# Patient Record
Sex: FEMALE | Race: WHITE | NOT HISPANIC OR LATINO | Employment: FULL TIME | URBAN - METROPOLITAN AREA
[De-identification: names, ages, dates, MRNs, and addresses within clinical notes are randomized per-mention and may not be internally consistent; named-entity substitution may affect disease eponyms.]

---

## 2017-01-27 ENCOUNTER — TRANSCRIBE ORDERS (OUTPATIENT)
Dept: ADMINISTRATIVE | Facility: HOSPITAL | Age: 42
End: 2017-01-27

## 2017-01-27 DIAGNOSIS — N63.0 LUMP OR MASS IN BREAST: Primary | ICD-10-CM

## 2017-01-30 ENCOUNTER — APPOINTMENT (OUTPATIENT)
Dept: OTHER | Facility: HOSPITAL | Age: 42
End: 2017-01-30
Attending: OBSTETRICS & GYNECOLOGY

## 2017-01-30 ENCOUNTER — HOSPITAL ENCOUNTER (OUTPATIENT)
Dept: MAMMOGRAPHY | Facility: HOSPITAL | Age: 42
Discharge: HOME | End: 2017-01-30
Attending: OBSTETRICS & GYNECOLOGY | Admitting: OBSTETRICS & GYNECOLOGY

## 2017-01-30 ENCOUNTER — HOSPITAL ENCOUNTER (OUTPATIENT)
Dept: ULTRASOUND IMAGING | Facility: HOSPITAL | Age: 42
Discharge: HOME | End: 2017-01-30

## 2017-01-30 DIAGNOSIS — N63.0 LUMP OR MASS IN BREAST: ICD-10-CM

## 2017-01-30 PROCEDURE — 77062 BREAST TOMOSYNTHESIS BI: CPT | Performed by: RADIOLOGY

## 2017-01-30 PROCEDURE — G0279 TOMOSYNTHESIS, MAMMO: HCPCS

## 2017-01-30 PROCEDURE — 77066 DX MAMMO INCL CAD BI: CPT | Performed by: RADIOLOGY

## 2017-01-30 PROCEDURE — 76642 ULTRASOUND BREAST LIMITED: CPT

## 2017-01-30 PROCEDURE — G0204 DX MAMMO INCL CAD BI: HCPCS

## 2017-01-30 PROCEDURE — 76642 ULTRASOUND BREAST LIMITED: CPT | Performed by: RADIOLOGY

## 2017-02-07 ENCOUNTER — APPOINTMENT (OUTPATIENT)
Dept: MAMMOGRAPHY | Facility: HOSPITAL | Age: 42
End: 2017-02-07
Attending: OBSTETRICS & GYNECOLOGY

## 2017-02-23 ENCOUNTER — LAB (OUTPATIENT)
Dept: LAB | Facility: HOSPITAL | Age: 42
End: 2017-02-23

## 2017-02-23 ENCOUNTER — TRANSCRIBE ORDERS (OUTPATIENT)
Dept: LAB | Facility: HOSPITAL | Age: 42
End: 2017-02-23

## 2017-02-23 DIAGNOSIS — Z32.01 PREGNANCY TEST PERFORMED, PREGNANCY CONFIRMED: ICD-10-CM

## 2017-02-23 DIAGNOSIS — Z32.01 PREGNANCY TEST PERFORMED, PREGNANCY CONFIRMED: Primary | ICD-10-CM

## 2017-02-23 LAB — HCG INTACT+B SERPL-ACNC: <5 MIU/ML

## 2017-02-23 PROCEDURE — 36415 COLL VENOUS BLD VENIPUNCTURE: CPT

## 2017-02-23 PROCEDURE — 84702 CHORIONIC GONADOTROPIN TEST: CPT | Performed by: SPECIALIST

## 2017-04-13 ENCOUNTER — OFFICE VISIT (OUTPATIENT)
Dept: INTERNAL MEDICINE | Facility: CLINIC | Age: 42
End: 2017-04-13

## 2017-04-13 VITALS
DIASTOLIC BLOOD PRESSURE: 62 MMHG | SYSTOLIC BLOOD PRESSURE: 110 MMHG | WEIGHT: 172 LBS | HEIGHT: 65 IN | BODY MASS INDEX: 28.66 KG/M2

## 2017-04-13 DIAGNOSIS — M54.2 NECK PAIN, BILATERAL: Primary | ICD-10-CM

## 2017-04-13 PROCEDURE — 96372 THER/PROPH/DIAG INJ SC/IM: CPT | Performed by: PHYSICIAN ASSISTANT

## 2017-04-13 PROCEDURE — 99213 OFFICE O/P EST LOW 20 MIN: CPT | Performed by: PHYSICIAN ASSISTANT

## 2017-04-13 RX ORDER — KETOROLAC TROMETHAMINE 30 MG/ML
30 INJECTION, SOLUTION INTRAMUSCULAR; INTRAVENOUS EVERY 6 HOURS PRN
Status: DISCONTINUED | OUTPATIENT
Start: 2017-04-13 | End: 2017-04-13

## 2017-04-13 RX ORDER — PREDNISONE 10 MG/1
TABLET ORAL
Qty: 30 TABLET | Refills: 0 | Status: SHIPPED | OUTPATIENT
Start: 2017-04-13 | End: 2017-04-13 | Stop reason: SDUPTHER

## 2017-04-13 RX ORDER — TIZANIDINE HYDROCHLORIDE 6 MG/1
6 CAPSULE, GELATIN COATED ORAL 3 TIMES DAILY
Qty: 90 CAPSULE | Refills: 0 | Status: SHIPPED | OUTPATIENT
Start: 2017-04-13 | End: 2018-01-25 | Stop reason: SDUPTHER

## 2017-04-13 RX ORDER — METHYLPREDNISOLONE ACETATE 80 MG/ML
40 INJECTION, SUSPENSION INTRA-ARTICULAR; INTRALESIONAL; INTRAMUSCULAR; SOFT TISSUE ONCE
Status: COMPLETED | OUTPATIENT
Start: 2017-04-13 | End: 2017-04-13

## 2017-04-13 RX ORDER — PREDNISONE 10 MG/1
TABLET ORAL
Qty: 30 TABLET | Refills: 0 | Status: SHIPPED | OUTPATIENT
Start: 2017-04-13 | End: 2018-10-09

## 2017-04-13 RX ORDER — METHYLPREDNISOLONE ACETATE 40 MG/ML
40 INJECTION, SUSPENSION INTRA-ARTICULAR; INTRALESIONAL; INTRAMUSCULAR; SOFT TISSUE ONCE
Status: DISCONTINUED | OUTPATIENT
Start: 2017-04-13 | End: 2017-04-13

## 2017-04-13 RX ORDER — PANTOPRAZOLE SODIUM 40 MG/1
40 TABLET, DELAYED RELEASE ORAL NIGHTLY
Qty: 30 TABLET | Refills: 0 | Status: SHIPPED | OUTPATIENT
Start: 2017-04-13 | End: 2017-04-13 | Stop reason: SDUPTHER

## 2017-04-13 RX ORDER — PANTOPRAZOLE SODIUM 40 MG/1
40 TABLET, DELAYED RELEASE ORAL NIGHTLY
Qty: 90 TABLET | Refills: 0 | Status: SHIPPED | OUTPATIENT
Start: 2017-04-13 | End: 2017-04-25 | Stop reason: SDUPTHER

## 2017-04-13 RX ORDER — KETOROLAC TROMETHAMINE 30 MG/ML
30 INJECTION, SOLUTION INTRAMUSCULAR; INTRAVENOUS ONCE
Status: COMPLETED | OUTPATIENT
Start: 2017-04-13 | End: 2017-04-13

## 2017-04-13 RX ADMIN — METHYLPREDNISOLONE ACETATE 40 MG: 80 INJECTION, SUSPENSION INTRA-ARTICULAR; INTRALESIONAL; INTRAMUSCULAR; SOFT TISSUE at 13:21

## 2017-04-13 RX ADMIN — KETOROLAC TROMETHAMINE 30 MG: 30 INJECTION, SOLUTION INTRAMUSCULAR; INTRAVENOUS at 13:20

## 2017-04-13 NOTE — PROGRESS NOTES
Chief Complaint   Patient presents with   • Ongoing both shoulder pain       Subjective   Rona Thomson is a 41 y.o. female.       History of Present Illness     Pt has had recurrence of the knots and pain in her bilateral medial shoulders and up into her neck, has been intermittent for 7 years, goes to massage therapist regularly. History of neck injury from water skiing accident, happened 20 years ago. Has had some residual pain since then. Has had xrays and MRI of neck in the past, most recent was xrays at chiropractor. Pain has recurred in the past few weeks, worsening over the past several days. Had steroid and toradol shot last year that helped the pain considerably, did not return until now. Does have significant stress with ongoing fertility treatments- not pregnant now. Concerned about what may be the underlying problem.      Current Outpatient Prescriptions:   •  cetirizine (ZyrTEC) 10 MG tablet, Take 10 mg by mouth daily., Disp: , Rfl:   •  cholecalciferol (VITAMIN D3) 1000 UNITS tablet, Take 1,000 Units by mouth daily., Disp: , Rfl:   •  clonazePAM (KlonoPIN) 1 MG tablet, Take 1 mg by mouth as needed for anxiety., Disp: , Rfl:   •  ibuprofen (ADVIL,MOTRIN) 600 MG tablet, Take 1 tablet by mouth every 6 (six) hours as needed for mild pain (1-3) for up to 30 doses., Disp: 30 tablet, Rfl: 0  •  lidocaine (LIDODERM) 5 %, Place 1 patch on the skin as needed for moderate pain (4-6). Remove & Discard patch within 12 hours or as directed by MD, Disp: , Rfl:   •  pantoprazole (PROTONIX) 40 MG EC tablet, Take 1 tablet by mouth Every Night., Disp: 90 tablet, Rfl: 0  •  Prenatal Vit-Fe Fumarate-FA (PRENATAL, CLASSIC, VITAMIN) 28-0.8 MG tablet tablet, Take 1 tablet by mouth daily., Disp: , Rfl:   •  TiZANidine (ZANAFLEX) 6 MG capsule, Take 1 capsule by mouth 3 (Three) Times a Day., Disp: 90 capsule, Rfl: 0  •  vitamin B-12 (CYANOCOBALAMIN) 1000 MCG tablet, Take 1,000 mcg by mouth daily., Disp: , Rfl:   •   "vitamin C (ASCORBIC ACID) 500 MG tablet, Take 500 mg by mouth daily., Disp: , Rfl:   •  predniSONE (DELTASONE) 10 MG tablet, Take 4 tablets for 3 days, then 3 tablets for 3 days, then 2 tablets for 3 days, then 1 tablet for 3 days, Disp: 30 tablet, Rfl: 0     PMFSH  The following portions of the patient's history were reviewed and updated as appropriate: allergies, current medications, past family history, past medical history, past social history, past surgical history and problem list.    Review of Systems   Constitutional: Negative for fever and unexpected weight change.   HENT: Negative.    Eyes: Negative.    Respiratory: Negative for shortness of breath and wheezing.    Cardiovascular: Negative for chest pain and palpitations.   Gastrointestinal: Negative for abdominal pain and blood in stool.   Endocrine: Negative.    Genitourinary: Negative.    Musculoskeletal: Positive for myalgias and neck pain. Negative for arthralgias and joint swelling.   Skin: Negative.    Allergic/Immunologic: Negative.    Neurological: Negative for seizures and syncope.   Hematological: Negative for adenopathy.   Psychiatric/Behavioral: Negative for agitation and confusion.       Objective   /62  Ht 65\" (165.1 cm)  Wt 172 lb (78 kg)  BMI 28.62 kg/m2    Physical Exam   Constitutional: She is oriented to person, place, and time. She appears well-developed and well-nourished.   HENT:   Head: Normocephalic and atraumatic.   Right Ear: External ear normal.   Left Ear: External ear normal.   Eyes: Conjunctivae are normal.   Neck: Normal range of motion.   Cardiovascular: Normal rate and regular rhythm.    Pulmonary/Chest: Effort normal.   Musculoskeletal: Normal range of motion.   Bilateral cervical spine paraspinal mm tenderness, trapezius tightness and tenderness, no decreased ROM   Neurological: She is alert and oriented to person, place, and time.   Skin: Skin is warm and dry.   Psychiatric: She has a normal mood and affect. " Her behavior is normal. Judgment and thought content normal.       Results for orders placed or performed in visit on 02/23/17   hCG, Quantitative, Pregnancy   Result Value Ref Range    HCG Quantitative <5.00 mIU/mL        ASSESSMENT/PLAN    Problem List Items Addressed This Visit        Nervous and Auditory    Neck pain, bilateral - Primary     Depomedrol and toradol IM in office. Take prednisone taper as directed. Refer for MRI of cervical spine and PT eval.         Relevant Medications    predniSONE (DELTASONE) 10 MG tablet    methylPREDNISolone acetate (DEPO-medrol) injection 40 mg (Completed)    ketorolac (TORADOL) injection 30 mg (Completed)    Other Relevant Orders    MRI Cervical Spine Without Contrast    Ambulatory Referral to Physical Therapy Evaluate and treat               Return if symptoms worsen or fail to improve, for Annual physical.

## 2017-04-14 PROBLEM — M54.2 NECK PAIN, BILATERAL: Status: ACTIVE | Noted: 2017-04-14

## 2017-04-14 NOTE — ASSESSMENT & PLAN NOTE
Depomedrol and toradol IM in office. Take prednisone taper as directed. Refer for MRI of cervical spine and PT eval.

## 2017-04-20 ENCOUNTER — APPOINTMENT (OUTPATIENT)
Dept: MRI IMAGING | Facility: HOSPITAL | Age: 42
End: 2017-04-20

## 2017-04-25 ENCOUNTER — TELEPHONE (OUTPATIENT)
Dept: INTERNAL MEDICINE | Facility: CLINIC | Age: 42
End: 2017-04-25

## 2017-04-25 RX ORDER — PANTOPRAZOLE SODIUM 40 MG/1
40 TABLET, DELAYED RELEASE ORAL NIGHTLY
Qty: 15 TABLET | Refills: 0 | Status: SHIPPED | OUTPATIENT
Start: 2017-04-25 | End: 2017-08-18 | Stop reason: SDUPTHER

## 2017-04-25 NOTE — TELEPHONE ENCOUNTER
----- Message from Mary Potts sent at 4/25/2017  3:38 PM EDT -----  Contact: PATIENT  PATIENT IS RETURNING YOUR CALL. YOU CAN CALL HER BACK -[phone]

## 2017-04-25 NOTE — TELEPHONE ENCOUNTER
Spoke with pt informed her we received PA request for pantoprazole, per pt it is covered and will be shipped out by [facility] on 04/29, pt requested that a 2 week supply is sent in to local pharmacy to get her by, if insurance doesn't pay for the 2 week supply she will pay herself until she receives med from [facility].

## 2017-08-18 ENCOUNTER — OFFICE VISIT (OUTPATIENT)
Dept: INTERNAL MEDICINE | Facility: CLINIC | Age: 42
End: 2017-08-18

## 2017-08-18 VITALS
WEIGHT: 176 LBS | BODY MASS INDEX: 29.29 KG/M2 | DIASTOLIC BLOOD PRESSURE: 62 MMHG | SYSTOLIC BLOOD PRESSURE: 100 MMHG | OXYGEN SATURATION: 98 % | HEART RATE: 74 BPM | RESPIRATION RATE: 14 BRPM

## 2017-08-18 DIAGNOSIS — R12 HEARTBURN: Primary | ICD-10-CM

## 2017-08-18 DIAGNOSIS — R07.81 RIB PAIN: ICD-10-CM

## 2017-08-18 DIAGNOSIS — J02.9 ACUTE PHARYNGITIS, UNSPECIFIED ETIOLOGY: ICD-10-CM

## 2017-08-18 LAB
EXPIRATION DATE: NORMAL
INTERNAL CONTROL: NORMAL
Lab: NORMAL
S PYO AG THROAT QL: NEGATIVE

## 2017-08-18 PROCEDURE — 99214 OFFICE O/P EST MOD 30 MIN: CPT | Performed by: NURSE PRACTITIONER

## 2017-08-18 PROCEDURE — 87880 STREP A ASSAY W/OPTIC: CPT | Performed by: NURSE PRACTITIONER

## 2017-08-18 RX ORDER — PANTOPRAZOLE SODIUM 40 MG/1
40 TABLET, DELAYED RELEASE ORAL DAILY
Qty: 90 TABLET | Refills: 3 | Status: SHIPPED | OUTPATIENT
Start: 2017-08-18 | End: 2017-10-23

## 2017-08-18 RX ORDER — PANTOPRAZOLE SODIUM 40 MG/1
40 TABLET, DELAYED RELEASE ORAL NIGHTLY
Qty: 30 TABLET | Refills: 11 | Status: SHIPPED | OUTPATIENT
Start: 2017-08-18 | End: 2017-08-18

## 2017-10-17 ENCOUNTER — TRANSCRIBE ORDERS (OUTPATIENT)
Dept: ADMINISTRATIVE | Facility: HOSPITAL | Age: 42
End: 2017-10-17

## 2017-10-23 ENCOUNTER — OFFICE VISIT (OUTPATIENT)
Dept: INTERNAL MEDICINE | Facility: CLINIC | Age: 42
End: 2017-10-23

## 2017-10-23 VITALS
DIASTOLIC BLOOD PRESSURE: 66 MMHG | WEIGHT: 174 LBS | OXYGEN SATURATION: 100 % | HEART RATE: 80 BPM | BODY MASS INDEX: 28.96 KG/M2 | SYSTOLIC BLOOD PRESSURE: 112 MMHG

## 2017-10-23 DIAGNOSIS — R07.81 RIB PAIN: ICD-10-CM

## 2017-10-23 DIAGNOSIS — Z00.00 HEALTHCARE MAINTENANCE: ICD-10-CM

## 2017-10-23 DIAGNOSIS — E55.9 VITAMIN D DEFICIENCY: ICD-10-CM

## 2017-10-23 DIAGNOSIS — E78.5 HYPERLIPIDEMIA, UNSPECIFIED HYPERLIPIDEMIA TYPE: ICD-10-CM

## 2017-10-23 DIAGNOSIS — M54.2 NECK PAIN, BILATERAL: Primary | ICD-10-CM

## 2017-10-23 PROCEDURE — 96372 THER/PROPH/DIAG INJ SC/IM: CPT | Performed by: PHYSICIAN ASSISTANT

## 2017-10-23 PROCEDURE — 99213 OFFICE O/P EST LOW 20 MIN: CPT | Performed by: PHYSICIAN ASSISTANT

## 2017-10-23 RX ORDER — METHYLPREDNISOLONE ACETATE 40 MG/ML
40 INJECTION, SUSPENSION INTRA-ARTICULAR; INTRALESIONAL; INTRAMUSCULAR; SOFT TISSUE ONCE
Status: COMPLETED | OUTPATIENT
Start: 2017-10-23 | End: 2017-10-23

## 2017-10-23 RX ORDER — OMEPRAZOLE 40 MG/1
1 CAPSULE, DELAYED RELEASE ORAL DAILY
COMMUNITY
Start: 2017-08-25 | End: 2019-12-10 | Stop reason: SDUPTHER

## 2017-10-23 RX ADMIN — METHYLPREDNISOLONE ACETATE 40 MG: 40 INJECTION, SUSPENSION INTRA-ARTICULAR; INTRALESIONAL; INTRAMUSCULAR; SOFT TISSUE at 15:51

## 2017-10-23 NOTE — PROGRESS NOTES
Chief Complaint   Patient presents with   • Discuss shoulder steroid injections     needs xray order for bilateral ribs       Subjective   Rona Thomson is a 42 y.o. female.       History of Present Illness     Pt is having another flare of her shoulder and upper back pain. She had 2 massages last week, has not helped much. Her stress level at work is much better. She has not had the MRI of her cervical spine because it is costly. She would like to see if she can have it done at a location close to home. Does get about 6 months relief from the toradol and steroid shot.    Did not have rib xray after last visit because needs new order.    She goes through a lot of intermittent depression with her fertility treatments. She still does not want to take something daily because she plans to restart treatments soon.      Current Outpatient Prescriptions:   •  cetirizine (ZyrTEC) 10 MG tablet, Take 10 mg by mouth daily., Disp: , Rfl:   •  cholecalciferol (VITAMIN D3) 1000 UNITS tablet, Take 1,000 Units by mouth daily., Disp: , Rfl:   •  clonazePAM (KlonoPIN) 1 MG tablet, Take 1 mg by mouth as needed for anxiety., Disp: , Rfl:   •  ibuprofen (ADVIL,MOTRIN) 600 MG tablet, Take 1 tablet by mouth every 6 (six) hours as needed for mild pain (1-3) for up to 30 doses., Disp: 30 tablet, Rfl: 0  •  lidocaine (LIDODERM) 5 %, Place 1 patch on the skin as needed for moderate pain (4-6). Remove & Discard patch within 12 hours or as directed by MD, Disp: , Rfl:   •  omeprazole (priLOSEC) 40 MG capsule, Take 1 capsule by mouth Daily., Disp: , Rfl:   •  Prenatal Vit-Fe Fumarate-FA (PRENATAL, CLASSIC, VITAMIN) 28-0.8 MG tablet tablet, Take 1 tablet by mouth daily., Disp: , Rfl:   •  TiZANidine (ZANAFLEX) 6 MG capsule, Take 1 capsule by mouth 3 (Three) Times a Day., Disp: 90 capsule, Rfl: 0  •  vitamin B-12 (CYANOCOBALAMIN) 1000 MCG tablet, Take 1,000 mcg by mouth daily., Disp: , Rfl:   •  vitamin C (ASCORBIC ACID) 500 MG tablet, Take  500 mg by mouth daily., Disp: , Rfl:   •  predniSONE (DELTASONE) 10 MG tablet, Take 4 tablets for 3 days, then 3 tablets for 3 days, then 2 tablets for 3 days, then 1 tablet for 3 days, Disp: 30 tablet, Rfl: 0  No current facility-administered medications for this visit.      [facility]  The following portions of the patient's history were reviewed and updated as appropriate: allergies, current medications, past family history, past medical history, past social history, past surgical history and problem list.    Review of Systems   Constitutional: Negative for appetite change, fever and unexpected weight change.   HENT: Negative.    Eyes: Negative for pain and visual disturbance.   Respiratory: Negative for chest tightness, shortness of breath and wheezing.    Cardiovascular: Negative for chest pain and palpitations.   Gastrointestinal: Negative for abdominal pain, blood in stool, diarrhea, nausea and vomiting.   Endocrine: Negative.    Genitourinary: Negative for difficulty urinating, flank pain, frequency and urgency.   Musculoskeletal: Positive for back pain and neck pain. Negative for joint swelling.   Skin: Negative for color change and rash.   Neurological: Negative for tremors and weakness.   Hematological: Negative for adenopathy.   Psychiatric/Behavioral: Positive for dysphoric mood. Negative for confusion and decreased concentration.   All other systems reviewed and are negative.      Objective   /66  Pulse 80  Wt 174 lb (78.9 kg)  SpO2 100%  BMI 28.96 kg/m2    Physical Exam   Constitutional: She appears well-developed and well-nourished.   HENT:   Head: Normocephalic.   Right Ear: Hearing, tympanic membrane, external ear and ear canal normal.   Left Ear: Hearing, tympanic membrane, external ear and ear canal normal.   Nose: Nose normal.   Mouth/Throat: Oropharynx is clear and moist.   Eyes: Conjunctivae are normal. Pupils are equal, round, and reactive to light.   Neck: Muscular tenderness present.  Decreased range of motion present.   Cardiovascular: Normal rate, regular rhythm and normal heart sounds.    Pulmonary/Chest: Effort normal and breath sounds normal. She has no decreased breath sounds. She has no wheezes. She has no rhonchi. She has no rales.   Musculoskeletal:        Cervical back: She exhibits decreased range of motion, tenderness, pain and spasm. She exhibits no bony tenderness, no swelling, no edema and no deformity.   Neurological: She is alert.   Skin: Skin is warm and dry.   Psychiatric: She has a normal mood and affect. Her behavior is normal.   Nursing note and vitals reviewed.           ASSESSMENT/PLAN    Problem List Items Addressed This Visit        Cardiovascular and Mediastinum    Hyperlipidemia    Relevant Orders    Lipid Panel    Comprehensive Metabolic Panel       Digestive    Vitamin D deficiency    Relevant Orders    Vitamin D 25 Hydroxy       Nervous and Auditory    Neck pain, bilateral - Primary     Reordered cervical spine MRI and referred to pain management for eval. Toradol and Depomedrol in office as ordered.         Relevant Medications    methylPREDNISolone acetate (DEPO-medrol) injection 40 mg (Completed)    ketorolac (TORADOL) injection 30 mg (Completed)    Other Relevant Orders    MRI Cervical Spine Without Contrast    Ambulatory Referral to Pain Management    Rib pain    Relevant Orders    XR ribs bilateral 4+ vw w pa chest      Other Visit Diagnoses     Healthcare maintenance        Relevant Orders    Lipid Panel    Comprehensive Metabolic Panel    CBC (No Diff)    TSH    Vitamin D 25 Hydroxy               Return for Annual physical with fasting labs.

## 2017-10-24 NOTE — ASSESSMENT & PLAN NOTE
Reordered cervical spine MRI and referred to pain management for eval. Toradol and Depomedrol in office as ordered.

## 2017-10-25 ENCOUNTER — TELEPHONE (OUTPATIENT)
Dept: INTERNAL MEDICINE | Facility: CLINIC | Age: 42
End: 2017-10-25

## 2017-10-31 ENCOUNTER — APPOINTMENT (OUTPATIENT)
Dept: MRI IMAGING | Facility: HOSPITAL | Age: 42
End: 2017-10-31

## 2017-11-10 ENCOUNTER — TELEPHONE (OUTPATIENT)
Dept: INTERNAL MEDICINE | Facility: CLINIC | Age: 42
End: 2017-11-10

## 2017-11-10 NOTE — TELEPHONE ENCOUNTER
BOB    MS TURNER WOULD LIKE A RETURN CALL REGARDING A REFERRAL FOR PAIN MANAGEMENT. HER CALL BACK # [phone]. SHE STATES THAT SHE HAS NOT HEARD ANYTHING BACK ABOUT THIS.

## 2017-11-17 ENCOUNTER — HOSPITAL ENCOUNTER (OUTPATIENT)
Dept: GENERAL RADIOLOGY | Facility: HOSPITAL | Age: 42
Discharge: HOME | End: 2017-11-17
Admitting: PHYSICIAN ASSISTANT

## 2017-11-17 ENCOUNTER — TELEPHONE (OUTPATIENT)
Dept: INTERNAL MEDICINE | Facility: CLINIC | Age: 42
End: 2017-11-17

## 2017-11-17 DIAGNOSIS — R07.81 RIB PAIN: ICD-10-CM

## 2017-11-17 PROCEDURE — 71111 X-RAY EXAM RIBS/CHEST4/> VWS: CPT

## 2017-11-17 PROCEDURE — 71111 X-RAY EXAM RIBS/CHEST4/> VWS: CPT | Performed by: RADIOLOGY

## 2017-11-18 LAB
25(OH)D3+25(OH)D2 SERPL-MCNC: 27.6 NG/ML (ref 30–100)
ALBUMIN SERPL-MCNC: 4.3 G/DL (ref 3.5–5.5)
ALBUMIN/GLOB SERPL: 1.5 {RATIO} (ref 1.2–2.2)
ALP SERPL-CCNC: 59 IU/L (ref 39–117)
ALT SERPL-CCNC: 17 IU/L (ref 0–32)
AMBIG ABBREV CMP14 DEFAULT: NORMAL
AMBIG ABBREV LP DEFAULT: NORMAL
AST SERPL-CCNC: 15 IU/L (ref 0–40)
BASOPHILS # BLD AUTO: 0 X10E3/UL (ref 0–0.2)
BASOPHILS NFR BLD AUTO: 1 %
BILIRUB SERPL-MCNC: 0.6 MG/DL (ref 0–1.2)
BUN SERPL-MCNC: 12 MG/DL (ref 6–24)
BUN/CREAT SERPL: 12 (ref 9–23)
CALCIUM SERPL-MCNC: 9.5 MG/DL (ref 8.7–10.2)
CHLORIDE SERPL-SCNC: 104 MMOL/L (ref 96–106)
CHOLEST SERPL-MCNC: 297 MG/DL (ref 100–199)
CO2 SERPL-SCNC: 25 MMOL/L (ref 18–29)
CREAT SERPL-MCNC: 0.98 MG/DL (ref 0.57–1)
EOSINOPHIL # BLD AUTO: 0.1 X10E3/UL (ref 0–0.4)
EOSINOPHIL NFR BLD AUTO: 2 %
ERYTHROCYTE [DISTWIDTH] IN BLOOD BY AUTOMATED COUNT: 12.1 % (ref 12.3–15.4)
GFR SERPLBLD CREATININE-BSD FMLA CKD-EPI: 72 ML/MIN/1.73
GFR SERPLBLD CREATININE-BSD FMLA CKD-EPI: 83 ML/MIN/1.73
GLOBULIN SER CALC-MCNC: 2.9 G/DL (ref 1.5–4.5)
GLUCOSE SERPL-MCNC: 98 MG/DL (ref 65–99)
HCT VFR BLD AUTO: 42.8 % (ref 34–46.6)
HDLC SERPL-MCNC: 57 MG/DL
HGB BLD-MCNC: 14.8 G/DL (ref 11.1–15.9)
LDLC SERPL CALC-MCNC: 225 MG/DL (ref 0–99)
LYMPHOCYTES # BLD AUTO: 2.1 X10E3/UL (ref 0.7–3.1)
LYMPHOCYTES NFR BLD AUTO: 32 %
MCH RBC QN AUTO: 32.5 PG (ref 26.6–33)
MCHC RBC AUTO-ENTMCNC: 34.6 G/DL (ref 31.5–35.7)
MCV RBC AUTO: 94 FL (ref 79–97)
MONOCYTES # BLD AUTO: 0.6 X10E3/UL (ref 0.1–0.9)
MONOCYTES NFR BLD AUTO: 9 %
NEUTROPHILS # BLD AUTO: 3.8 X10E3/UL (ref 1.4–7)
NEUTROPHILS NFR BLD AUTO: 56 %
PLATELET # BLD AUTO: 270 X10E3/UL (ref 150–379)
POTASSIUM SERPL-SCNC: 4.6 MMOL/L (ref 3.5–5.2)
PROT SERPL-MCNC: 7.2 G/DL (ref 6–8.5)
RBC # BLD AUTO: 4.55 X10E6/UL (ref 3.77–5.28)
SODIUM SERPL-SCNC: 140 MMOL/L (ref 134–144)
TRIGL SERPL-MCNC: 73 MG/DL (ref 0–149)
TSH SERPL DL<=0.005 MIU/L-ACNC: 1.25 UIU/ML (ref 0.45–4.5)
VLDLC SERPL CALC-MCNC: 15 MG/DL (ref 5–40)
WBC # BLD AUTO: 6.6 X10E3/UL (ref 3.4–10.8)

## 2018-01-25 DIAGNOSIS — M54.2 NECK PAIN, BILATERAL: ICD-10-CM

## 2018-01-25 RX ORDER — PREDNISONE 10 MG/1
TABLET ORAL
Qty: 30 TABLET | Refills: 0 | OUTPATIENT
Start: 2018-01-25

## 2018-01-25 RX ORDER — TIZANIDINE HYDROCHLORIDE 6 MG/1
CAPSULE, GELATIN COATED ORAL
Qty: 90 CAPSULE | Refills: 0 | Status: SHIPPED | OUTPATIENT
Start: 2018-01-25 | End: 2019-02-13 | Stop reason: SDUPTHER

## 2018-03-29 ENCOUNTER — TRANSCRIBE ORDERS (OUTPATIENT)
Dept: ADMINISTRATIVE | Facility: HOSPITAL | Age: 43
End: 2018-03-29

## 2018-03-29 DIAGNOSIS — Z12.31 VISIT FOR SCREENING MAMMOGRAM: Primary | ICD-10-CM

## 2018-05-18 ENCOUNTER — TELEPHONE (OUTPATIENT)
Dept: INTERNAL MEDICINE | Facility: CLINIC | Age: 43
End: 2018-05-18

## 2018-05-18 RX ORDER — VALACYCLOVIR HYDROCHLORIDE 1 G/1
2000 TABLET, FILM COATED ORAL 2 TIMES DAILY
Qty: 4 TABLET | Refills: 1 | Status: SHIPPED | OUTPATIENT
Start: 2018-05-18 | End: 2018-05-19

## 2018-05-18 RX ORDER — ACYCLOVIR 50 MG/G
OINTMENT TOPICAL
Qty: 30 G | Refills: 0 | Status: SHIPPED | OUTPATIENT
Start: 2018-05-18

## 2018-05-18 NOTE — TELEPHONE ENCOUNTER
PT CALLED IN REQUESTING TO LEAVE A MESSAGE FOR TO [facility] OR MAURILIO THIBODEAUX. THE PT HAS BEEN EXPERIENCING BLISTERS ON HER LIP WHICH SHE BELIEVES IS CAUSED FROM THE HEAT. THE PT WANTED TO SEE IF TO [facility] COULD WRITE A SHORT TEMPORARY FILL FOR VALTREX & ZOVORAX. THE PT STATES SHE HAS USED BOTH IN THE PAST AND WANTED TO SEE IF IT COULD BE WRITTEN AS A TEMPORARY SUPPLY. PT HAS A SCHEDULED PHYSICAL FOR 05/22. BEST CALL BACK # [phone]

## 2018-05-31 ENCOUNTER — TRANSCRIBE ORDERS (OUTPATIENT)
Dept: ADMINISTRATIVE | Facility: HOSPITAL | Age: 43
End: 2018-05-31

## 2018-05-31 DIAGNOSIS — Z12.31 VISIT FOR SCREENING MAMMOGRAM: Primary | ICD-10-CM

## 2018-06-12 ENCOUNTER — HOSPITAL ENCOUNTER (OUTPATIENT)
Dept: MAMMOGRAPHY | Facility: HOSPITAL | Age: 43
Discharge: HOME | End: 2018-06-12
Attending: OBSTETRICS & GYNECOLOGY | Admitting: OBSTETRICS & GYNECOLOGY

## 2018-06-12 DIAGNOSIS — Z12.31 VISIT FOR SCREENING MAMMOGRAM: ICD-10-CM

## 2018-06-12 PROCEDURE — 77067 SCR MAMMO BI INCL CAD: CPT

## 2018-06-12 PROCEDURE — 77067 SCR MAMMO BI INCL CAD: CPT | Performed by: RADIOLOGY

## 2018-06-12 PROCEDURE — 77063 BREAST TOMOSYNTHESIS BI: CPT | Performed by: RADIOLOGY

## 2018-06-12 PROCEDURE — 77063 BREAST TOMOSYNTHESIS BI: CPT

## 2018-06-19 ENCOUNTER — OFFICE VISIT (OUTPATIENT)
Dept: INTERNAL MEDICINE | Facility: CLINIC | Age: 43
End: 2018-06-19

## 2018-06-19 VITALS
OXYGEN SATURATION: 98 % | WEIGHT: 174 LBS | RESPIRATION RATE: 16 BRPM | HEIGHT: 65 IN | SYSTOLIC BLOOD PRESSURE: 106 MMHG | BODY MASS INDEX: 28.99 KG/M2 | DIASTOLIC BLOOD PRESSURE: 60 MMHG | HEART RATE: 76 BPM

## 2018-06-19 DIAGNOSIS — E78.01 FAMILIAL HYPERCHOLESTEROLEMIA: ICD-10-CM

## 2018-06-19 DIAGNOSIS — Z00.00 HEALTH CARE MAINTENANCE: Primary | ICD-10-CM

## 2018-06-19 PROCEDURE — 99396 PREV VISIT EST AGE 40-64: CPT | Performed by: PHYSICIAN ASSISTANT

## 2018-06-19 RX ORDER — ROSUVASTATIN CALCIUM 5 MG/1
5 TABLET, COATED ORAL DAILY
Qty: 30 TABLET | Refills: 3 | Status: SHIPPED | OUTPATIENT
Start: 2018-06-19 | End: 2018-09-07 | Stop reason: SDUPTHER

## 2018-06-19 RX ORDER — VALACYCLOVIR HYDROCHLORIDE 1 G/1
2000 TABLET, FILM COATED ORAL 2 TIMES DAILY
Qty: 4 TABLET | Refills: 5 | Status: SHIPPED | OUTPATIENT
Start: 2018-06-19 | End: 2021-10-18 | Stop reason: SDUPTHER

## 2018-06-19 NOTE — PROGRESS NOTES
"Chief Complaint   Patient presents with   • Annual Exam     Pt wants to discuss cholesterol  Pt would like Rx for valtrex       Subjective   Rona Thomson is a 42 y.o. female.       History of Present Illness     The patient is being seen for a health maintenance evaluation.  The last health maintenance was unknown year(s) ago.    Social History  Rona  does not smoke cigarettes.   She drinks rare alcohol.  She does not use illicit drugs.    General History  Rona  does have regular dental visits.  She does complain of vision problems. Wears glasses. Last eye exam was 2018.  Immunizations are up to date. The patient needs the following immunizations: Td done 2010    Lifestyle  Rona  consumes in general, a \"healthy\" diet  .  She exercises three times a week.    Reproductive Health  Rona  is premenopausal.  She reports periods are regular every 28-30 days.  She is sexually active. Her contraceptive plan is no method.    Screening  Last pap was 1/2018 by Dr Miller.  Last mammogram was  6/2018.  Last colonoscopy was 2014 by Diane Winter, repeat 2019 d/t multiple polyps.  Last DEXA was never.     Pt has had fever blisters twice over the past few months, had not had them in years. Valtrex works well.    Pt has had elevated cholesterol for 10 years, has been undergoing fertility treatments for the past several years and not had treatment. She has familial hyperlipidemia. Has tried statins in the past and had severe muscle cramps, not sure which ones she has tried. Willing to try again. May be pursuing other fertility options in the future but will be taking a break for a little while. She would like to try cholesterol treatment in the meantime.             Current Outpatient Prescriptions:   •  acyclovir (ZOVIRAX) 5 % ointment, Apply  topically Every 4 (Four) Hours While Awake., Disp: 30 g, Rfl: 0  •  cetirizine (ZyrTEC) 10 MG tablet, Take 10 mg by mouth daily., Disp: , Rfl:   •  cholecalciferol " (VITAMIN D3) 1000 UNITS tablet, Take 1,000 Units by mouth daily., Disp: , Rfl:   •  clonazePAM (KlonoPIN) 1 MG tablet, Take 1 mg by mouth as needed for anxiety., Disp: , Rfl:   •  ibuprofen (ADVIL,MOTRIN) 600 MG tablet, Take 1 tablet by mouth every 6 (six) hours as needed for mild pain (1-3) for up to 30 doses., Disp: 30 tablet, Rfl: 0  •  Prenatal Vit-Fe Fumarate-FA (PRENATAL, CLASSIC, VITAMIN) 28-0.8 MG tablet tablet, Take 1 tablet by mouth daily., Disp: , Rfl:   •  TiZANidine (ZANAFLEX) 6 MG capsule, TAKE 1 CAPSULE THREE TIMES A DAY, Disp: 90 capsule, Rfl: 0  •  vitamin B-12 (CYANOCOBALAMIN) 1000 MCG tablet, Take 1,000 mcg by mouth daily., Disp: , Rfl:   •  vitamin C (ASCORBIC ACID) 500 MG tablet, Take 500 mg by mouth daily., Disp: , Rfl:   •  lidocaine (LIDODERM) 5 %, Place 1 patch on the skin as needed for moderate pain (4-6). Remove & Discard patch within 12 hours or as directed by MD, Disp: , Rfl:   •  omeprazole (priLOSEC) 40 MG capsule, Take 1 capsule by mouth Daily., Disp: , Rfl:   •  predniSONE (DELTASONE) 10 MG tablet, Take 4 tablets for 3 days, then 3 tablets for 3 days, then 2 tablets for 3 days, then 1 tablet for 3 days, Disp: 30 tablet, Rfl: 0  •  rosuvastatin (CRESTOR) 5 MG tablet, Take 1 tablet by mouth Daily., Disp: 30 tablet, Rfl: 3  •  valACYclovir (VALTREX) 1000 MG tablet, Take 2 tablets by mouth 2 (Two) Times a Day., Disp: 4 tablet, Rfl: 5     PMFSH  The following portions of the patient's history were reviewed and updated as appropriate: allergies, current medications, past family history, past medical history, past social history, past surgical history and problem list.    Review of Systems   Constitutional: Negative for appetite change, fever and unexpected weight change.   HENT: Negative for ear pain, facial swelling and sore throat.    Eyes: Negative for pain and visual disturbance.   Respiratory: Negative for chest tightness, shortness of breath and wheezing.    Cardiovascular: Negative  "for chest pain and palpitations.   Gastrointestinal: Negative for abdominal pain and blood in stool.   Endocrine: Negative.    Genitourinary: Negative for difficulty urinating and hematuria.   Musculoskeletal: Negative for joint swelling.   Neurological: Negative for tremors, seizures and syncope.   Hematological: Negative for adenopathy.   Psychiatric/Behavioral: Negative.        Objective   /60   Pulse 76   Resp 16   Ht 165.1 cm (65\")   Wt 78.9 kg (174 lb)   LMP 05/22/2018   SpO2 98%   Breastfeeding? No   BMI 28.96 kg/m²     Physical Exam   Constitutional: She is oriented to person, place, and time. She appears well-developed and well-nourished. No distress.   HENT:   Head: Normocephalic and atraumatic. Hair is normal.   Right Ear: Hearing, tympanic membrane, external ear and ear canal normal. No drainage. No decreased hearing is noted.   Left Ear: Hearing, tympanic membrane, external ear and ear canal normal. No decreased hearing is noted.   Nose: No nasal deformity.   Mouth/Throat: Oropharynx is clear and moist.   Eyes: Conjunctivae, EOM and lids are normal. Pupils are equal, round, and reactive to light. Lids are everted and swept, no foreign bodies found. Right eye exhibits no discharge. Left eye exhibits no discharge.   Fundoscopic exam:       The right eye shows red reflex.        The left eye shows red reflex.   Neck: Normal range of motion. Neck supple. No JVD present. No tracheal deviation present. No thyromegaly present.   Cardiovascular: Normal rate, regular rhythm, normal heart sounds, intact distal pulses and normal pulses.  Exam reveals no gallop and no friction rub.    No murmur heard.  Pulmonary/Chest: Effort normal and breath sounds normal. No respiratory distress. She has no wheezes. She has no rales. She exhibits no tenderness.   Abdominal: Soft. Bowel sounds are normal. She exhibits no distension and no mass. There is no tenderness. There is no rebound and no guarding. No hernia. "   Musculoskeletal: Normal range of motion. She exhibits no edema, tenderness or deformity.   Lymphadenopathy:     She has no cervical adenopathy.        Right: No inguinal adenopathy present.        Left: No inguinal adenopathy present.   Neurological: She is alert and oriented to person, place, and time. She has normal reflexes. She displays normal reflexes. No cranial nerve deficit. She exhibits normal muscle tone. Coordination normal.   Skin: Skin is warm and dry. No rash noted. She is not diaphoretic. No erythema.   Psychiatric: She has a normal mood and affect. Her behavior is normal. Judgment and thought content normal.   Nursing note and vitals reviewed.      ASSESSMENT/PLAN    Problem List Items Addressed This Visit        Cardiovascular and Mediastinum    Familial hypercholesterolemia     Lipid abnormalities are worsening.  Pharmacotherapy as ordered.  Lipids will be reassessed 4 months.         Relevant Medications    rosuvastatin (CRESTOR) 5 MG tablet       Other    Health care maintenance - Primary     Immunizations: Td done 2010  Eye exam: done 2018  Pap Smear: done 1/2018  Mammogram: done 6/2018  Dexa: due post menopausal  Colonoscopy: due 2019 per Dr Diane Winter  Labs: fasting labs ordered             Relevant Orders    Comprehensive Metabolic Panel    Lipid Panel    CBC & Differential    TSH    Vitamin D 25 Hydroxy    Vitamin B12               Return in about 1 year (around 6/19/2019) for Annual physical.

## 2018-06-20 PROBLEM — Z00.00 HEALTH CARE MAINTENANCE: Status: ACTIVE | Noted: 2018-06-20

## 2018-06-21 NOTE — ASSESSMENT & PLAN NOTE
Immunizations: Td done 2010  Eye exam: done 2018  Pap Smear: done 1/2018  Mammogram: done 6/2018  Dexa: due post menopausal  Colonoscopy: due 2019 per Dr Diane Winter  Labs: fasting labs ordered

## 2018-09-07 DIAGNOSIS — E78.01 FAMILIAL HYPERCHOLESTEROLEMIA: ICD-10-CM

## 2018-09-07 RX ORDER — ROSUVASTATIN CALCIUM 5 MG/1
5 TABLET, COATED ORAL DAILY
Qty: 30 TABLET | Refills: 3 | Status: SHIPPED | OUTPATIENT
Start: 2018-09-07 | End: 2018-11-18 | Stop reason: SDUPTHER

## 2018-10-09 ENCOUNTER — OFFICE VISIT (OUTPATIENT)
Dept: INTERNAL MEDICINE | Facility: CLINIC | Age: 43
End: 2018-10-09

## 2018-10-09 VITALS
BODY MASS INDEX: 26.99 KG/M2 | HEIGHT: 65 IN | DIASTOLIC BLOOD PRESSURE: 60 MMHG | OXYGEN SATURATION: 97 % | WEIGHT: 162 LBS | HEART RATE: 67 BPM | SYSTOLIC BLOOD PRESSURE: 102 MMHG

## 2018-10-09 DIAGNOSIS — J30.9 ALLERGIC RHINITIS, UNSPECIFIED SEASONALITY, UNSPECIFIED TRIGGER: ICD-10-CM

## 2018-10-09 DIAGNOSIS — R94.6 ABNORMAL THYROID SCAN: Primary | ICD-10-CM

## 2018-10-09 PROCEDURE — 99213 OFFICE O/P EST LOW 20 MIN: CPT | Performed by: PHYSICIAN ASSISTANT

## 2018-10-09 RX ORDER — MONTELUKAST SODIUM 10 MG/1
10 TABLET ORAL DAILY
Qty: 30 TABLET | Refills: 5 | Status: SHIPPED | OUTPATIENT
Start: 2018-10-09 | End: 2019-02-13 | Stop reason: SDUPTHER

## 2018-10-09 NOTE — PROGRESS NOTES
Chief Complaint   Patient presents with   • Thyroid check   • Discuss singulair RX       Subjective   Rona Thomson is a 43 y.o. female.       History of Present Illness     Pt had xray done by her chiropractor and was told that she had some calcifications in her thyroid. She notes that she has fatigue and hair loss and has become worried about her thyroid.    Was given singulair at recent urgent care visit. Would like to continue it for her allergies. Takes antihistamine year round.      Current Outpatient Prescriptions:   •  acyclovir (ZOVIRAX) 5 % ointment, Apply  topically Every 4 (Four) Hours While Awake., Disp: 30 g, Rfl: 0  •  cetirizine (ZyrTEC) 10 MG tablet, Take 10 mg by mouth daily., Disp: , Rfl:   •  cholecalciferol (VITAMIN D3) 1000 UNITS tablet, Take 1,000 Units by mouth daily., Disp: , Rfl:   •  clonazePAM (KlonoPIN) 1 MG tablet, Take 1 mg by mouth as needed for anxiety., Disp: , Rfl:   •  ibuprofen (ADVIL,MOTRIN) 600 MG tablet, Take 1 tablet by mouth every 6 (six) hours as needed for mild pain (1-3) for up to 30 doses., Disp: 30 tablet, Rfl: 0  •  lidocaine (LIDODERM) 5 %, Place 1 patch on the skin as needed for moderate pain (4-6). Remove & Discard patch within 12 hours or as directed by MD, Disp: , Rfl:   •  omeprazole (priLOSEC) 40 MG capsule, Take 1 capsule by mouth Daily., Disp: , Rfl:   •  Prenatal Vit-Fe Fumarate-FA (PRENATAL, CLASSIC, VITAMIN) 28-0.8 MG tablet tablet, Take 1 tablet by mouth daily., Disp: , Rfl:   •  rosuvastatin (CRESTOR) 5 MG tablet, Take 1 tablet by mouth Daily., Disp: 30 tablet, Rfl: 3  •  TiZANidine (ZANAFLEX) 6 MG capsule, TAKE 1 CAPSULE THREE TIMES A DAY, Disp: 90 capsule, Rfl: 0  •  valACYclovir (VALTREX) 1000 MG tablet, Take 2 tablets by mouth 2 (Two) Times a Day., Disp: 4 tablet, Rfl: 5  •  vitamin B-12 (CYANOCOBALAMIN) 1000 MCG tablet, Take 1,000 mcg by mouth daily., Disp: , Rfl:   •  vitamin C (ASCORBIC ACID) 500 MG tablet, Take 500 mg by mouth daily., Disp:  ", Rfl:   •  montelukast (SINGULAIR) 10 MG tablet, Take 1 tablet by mouth Daily., Disp: 30 tablet, Rfl: 5     PMFSH  The following portions of the patient's history were reviewed and updated as appropriate: allergies, current medications, past family history, past medical history, past social history, past surgical history and problem list.    Review of Systems   Constitutional: Positive for fatigue. Negative for activity change and appetite change.   HENT: Negative for congestion and rhinorrhea.    Respiratory: Negative for chest tightness and shortness of breath.    Cardiovascular: Negative for chest pain and palpitations.   Gastrointestinal: Negative for abdominal pain.   Genitourinary: Negative for dysuria.   Musculoskeletal: Negative for arthralgias and myalgias.   Neurological: Negative for dizziness, weakness, light-headedness and headaches.   Psychiatric/Behavioral: Negative for dysphoric mood. The patient is not nervous/anxious.        Objective   /60   Pulse 67   Ht 165.1 cm (65\")   Wt 73.5 kg (162 lb)   SpO2 97%   BMI 26.96 kg/m²     Physical Exam   Constitutional: She appears well-developed and well-nourished.   HENT:   Head: Normocephalic.   Right Ear: Hearing, tympanic membrane, external ear and ear canal normal.   Left Ear: Hearing, tympanic membrane, external ear and ear canal normal.   Nose: Nose normal.   Mouth/Throat: Oropharynx is clear and moist.   Eyes: Pupils are equal, round, and reactive to light. Conjunctivae are normal.   Neck: Normal range of motion. No thyroid mass and no thyromegaly present.   Cardiovascular: Normal rate, regular rhythm and normal heart sounds.    Pulmonary/Chest: Effort normal and breath sounds normal. She has no decreased breath sounds. She has no wheezes. She has no rhonchi. She has no rales.   Musculoskeletal: Normal range of motion.   Neurological: She is alert.   Skin: Skin is warm and dry.   Psychiatric: She has a normal mood and affect. Her behavior is " normal.   Nursing note and vitals reviewed.           ASSESSMENT/PLAN    Problem List Items Addressed This Visit        Respiratory    Allergic rhinitis    Relevant Medications    montelukast (SINGULAIR) 10 MG tablet      Other Visit Diagnoses     Abnormal thyroid scan    -  Primary    Relevant Orders    US Thyroid    T4, Free               Return for Next scheduled follow up.

## 2018-10-20 LAB
25(OH)D3+25(OH)D2 SERPL-MCNC: 34.3 NG/ML (ref 30–100)
ALBUMIN SERPL-MCNC: 4.6 G/DL (ref 3.5–5.5)
ALBUMIN/GLOB SERPL: 1.9 {RATIO} (ref 1.2–2.2)
ALP SERPL-CCNC: 46 IU/L (ref 39–117)
ALT SERPL-CCNC: 15 IU/L (ref 0–32)
AMBIG ABBREV CMP14 DEFAULT: NORMAL
AMBIG ABBREV LP DEFAULT: NORMAL
AST SERPL-CCNC: 15 IU/L (ref 0–40)
BASOPHILS # BLD AUTO: 0.1 X10E3/UL (ref 0–0.2)
BASOPHILS NFR BLD AUTO: 1 %
BILIRUB SERPL-MCNC: 0.7 MG/DL (ref 0–1.2)
BUN SERPL-MCNC: 14 MG/DL (ref 6–24)
BUN/CREAT SERPL: 15 (ref 9–23)
CALCIUM SERPL-MCNC: 9.5 MG/DL (ref 8.7–10.2)
CHLORIDE SERPL-SCNC: 102 MMOL/L (ref 96–106)
CHOLEST SERPL-MCNC: 193 MG/DL (ref 100–199)
CO2 SERPL-SCNC: 23 MMOL/L (ref 20–29)
CREAT SERPL-MCNC: 0.95 MG/DL (ref 0.57–1)
EOSINOPHIL # BLD AUTO: 0 X10E3/UL (ref 0–0.4)
EOSINOPHIL NFR BLD AUTO: 1 %
ERYTHROCYTE [DISTWIDTH] IN BLOOD BY AUTOMATED COUNT: 13.7 % (ref 12.3–15.4)
GLOBULIN SER CALC-MCNC: 2.4 G/DL (ref 1.5–4.5)
GLUCOSE SERPL-MCNC: 75 MG/DL (ref 65–99)
HCT VFR BLD AUTO: 42.1 % (ref 34–46.6)
HDLC SERPL-MCNC: 51 MG/DL
HGB BLD-MCNC: 14.4 G/DL (ref 11.1–15.9)
IMM GRANULOCYTES # BLD: 0 X10E3/UL (ref 0–0.1)
IMM GRANULOCYTES NFR BLD: 0 %
LDLC SERPL CALC-MCNC: 131 MG/DL (ref 0–99)
LYMPHOCYTES # BLD AUTO: 1.6 X10E3/UL (ref 0.7–3.1)
LYMPHOCYTES NFR BLD AUTO: 33 %
MCH RBC QN AUTO: 32.6 PG (ref 26.6–33)
MCHC RBC AUTO-ENTMCNC: 34.2 G/DL (ref 31.5–35.7)
MCV RBC AUTO: 95 FL (ref 79–97)
MONOCYTES # BLD AUTO: 0.5 X10E3/UL (ref 0.1–0.9)
MONOCYTES NFR BLD AUTO: 10 %
NEUTROPHILS # BLD AUTO: 2.7 X10E3/UL (ref 1.4–7)
NEUTROPHILS NFR BLD AUTO: 55 %
PLATELET # BLD AUTO: 248 X10E3/UL (ref 150–379)
POTASSIUM SERPL-SCNC: 4.5 MMOL/L (ref 3.5–5.2)
PROT SERPL-MCNC: 7 G/DL (ref 6–8.5)
RBC # BLD AUTO: 4.42 X10E6/UL (ref 3.77–5.28)
SODIUM SERPL-SCNC: 140 MMOL/L (ref 134–144)
T4 FREE SERPL-MCNC: 1.32 NG/DL (ref 0.82–1.77)
TRIGL SERPL-MCNC: 54 MG/DL (ref 0–149)
TSH SERPL DL<=0.005 MIU/L-ACNC: 1.36 UIU/ML (ref 0.45–4.5)
VIT B12 SERPL-MCNC: 755 PG/ML (ref 232–1245)
VLDLC SERPL CALC-MCNC: 11 MG/DL (ref 5–40)
WBC # BLD AUTO: 5 X10E3/UL (ref 3.4–10.8)

## 2018-10-29 NOTE — PROGRESS NOTES
Your labs look great! Your cholesterol has improved significantly. Please continue your current dose of medication.

## 2018-11-12 ENCOUNTER — HOSPITAL ENCOUNTER (OUTPATIENT)
Dept: ULTRASOUND IMAGING | Facility: HOSPITAL | Age: 43
Discharge: HOME | End: 2018-11-12
Admitting: PHYSICIAN ASSISTANT

## 2018-11-12 DIAGNOSIS — R94.6 ABNORMAL THYROID SCAN: ICD-10-CM

## 2018-11-12 PROCEDURE — 76536 US EXAM OF HEAD AND NECK: CPT

## 2018-11-12 PROCEDURE — 76536 US EXAM OF HEAD AND NECK: CPT | Performed by: RADIOLOGY

## 2018-11-18 DIAGNOSIS — E78.01 FAMILIAL HYPERCHOLESTEROLEMIA: ICD-10-CM

## 2018-11-18 RX ORDER — ROSUVASTATIN CALCIUM 5 MG/1
TABLET, COATED ORAL
Qty: 30 TABLET | Refills: 3 | Status: SHIPPED | OUTPATIENT
Start: 2018-11-18 | End: 2019-02-16 | Stop reason: SDUPTHER

## 2019-02-13 DIAGNOSIS — J30.9 ALLERGIC RHINITIS, UNSPECIFIED SEASONALITY, UNSPECIFIED TRIGGER: ICD-10-CM

## 2019-02-14 RX ORDER — MONTELUKAST SODIUM 10 MG/1
10 TABLET ORAL DAILY
Qty: 30 TABLET | Refills: 5 | Status: SHIPPED | OUTPATIENT
Start: 2019-02-14 | End: 2019-12-10 | Stop reason: SDUPTHER

## 2019-02-14 RX ORDER — TIZANIDINE HYDROCHLORIDE 6 MG/1
6 CAPSULE, GELATIN COATED ORAL 3 TIMES DAILY
Qty: 90 CAPSULE | Refills: 0 | Status: SHIPPED | OUTPATIENT
Start: 2019-02-14 | End: 2019-05-20 | Stop reason: SDUPTHER

## 2019-02-16 DIAGNOSIS — E78.01 FAMILIAL HYPERCHOLESTEROLEMIA: ICD-10-CM

## 2019-02-16 RX ORDER — ROSUVASTATIN CALCIUM 5 MG/1
TABLET, COATED ORAL
Qty: 30 TABLET | Refills: 3 | Status: SHIPPED | OUTPATIENT
Start: 2019-02-16 | End: 2019-05-17 | Stop reason: SDUPTHER

## 2019-03-08 ENCOUNTER — OFFICE VISIT (OUTPATIENT)
Dept: INTERNAL MEDICINE | Facility: CLINIC | Age: 44
End: 2019-03-08

## 2019-03-08 VITALS
HEIGHT: 65 IN | SYSTOLIC BLOOD PRESSURE: 104 MMHG | BODY MASS INDEX: 27.99 KG/M2 | DIASTOLIC BLOOD PRESSURE: 68 MMHG | HEART RATE: 67 BPM | OXYGEN SATURATION: 100 % | WEIGHT: 168 LBS

## 2019-03-08 DIAGNOSIS — M54.2 NECK PAIN, BILATERAL: ICD-10-CM

## 2019-03-08 DIAGNOSIS — F41.9 ANXIETY: Primary | ICD-10-CM

## 2019-03-08 PROCEDURE — 99213 OFFICE O/P EST LOW 20 MIN: CPT | Performed by: PHYSICIAN ASSISTANT

## 2019-03-08 RX ORDER — FLUOXETINE 10 MG/1
10 CAPSULE ORAL DAILY
Qty: 30 CAPSULE | Refills: 3 | Status: SHIPPED | OUTPATIENT
Start: 2019-03-08 | End: 2019-07-10 | Stop reason: SDUPTHER

## 2019-03-08 NOTE — PROGRESS NOTES
Chief Complaint   Patient presents with   • Neck Injury     snow skiing accident   • Anxiety   • Depression   • blood work     pt is fasting       Subjective   Rona Thomson is a 43 y.o. female.       History of Present Illness     Pt had a ski accident about a week ago- fell forward and had immediate neck pain. Went to [facility] in [place] and had xrays the following day, recommended she go to the ER so she went the next day to ER in [facility]. She had CT of her head. She was having positional dizziness but is improving daily.    She is feeling like she is struggling more with depression. This time of year is difficult with the weather and darkness. Has had changes at work and fertility issues are causing increased stress and depression.        Current Outpatient Medications:   •  acyclovir (ZOVIRAX) 5 % ointment, Apply  topically Every 4 (Four) Hours While Awake., Disp: 30 g, Rfl: 0  •  cetirizine (ZyrTEC) 10 MG tablet, Take 10 mg by mouth daily., Disp: , Rfl:   •  cholecalciferol (VITAMIN D3) 1000 UNITS tablet, Take 1,000 Units by mouth daily., Disp: , Rfl:   •  clonazePAM (KlonoPIN) 1 MG tablet, Take 1 mg by mouth as needed for anxiety., Disp: , Rfl:   •  montelukast (SINGULAIR) 10 MG tablet, Take 1 tablet by mouth Daily., Disp: 30 tablet, Rfl: 5  •  omeprazole (priLOSEC) 40 MG capsule, Take 1 capsule by mouth Daily., Disp: , Rfl:   •  Prenatal Vit-Fe Fumarate-FA (PRENATAL, CLASSIC, VITAMIN) 28-0.8 MG tablet tablet, Take 1 tablet by mouth daily., Disp: , Rfl:   •  rosuvastatin (CRESTOR) 5 MG tablet, TAKE 1 TABLET DAILY, Disp: 30 tablet, Rfl: 3  •  TiZANidine (ZANAFLEX) 6 MG capsule, Take 1 capsule by mouth 3 (Three) Times a Day., Disp: 90 capsule, Rfl: 0  •  valACYclovir (VALTREX) 1000 MG tablet, Take 2 tablets by mouth 2 (Two) Times a Day., Disp: 4 tablet, Rfl: 5  •  vitamin B-12 (CYANOCOBALAMIN) 1000 MCG tablet, Take 1,000 mcg by mouth daily., Disp: , Rfl:   •  FLUoxetine (PROZAC) 10 MG capsule, Take 1 capsule  "by mouth Daily., Disp: 30 capsule, Rfl: 3     PMFSH  The following portions of the patient's history were reviewed and updated as appropriate: allergies, current medications, past family history, past medical history, past social history, past surgical history and problem list.    Review of Systems   Constitutional: Negative for chills, fever and unexpected weight change.   HENT: Negative.    Eyes: Negative for pain and visual disturbance.   Respiratory: Negative for chest tightness and shortness of breath.    Cardiovascular: Negative for chest pain.   Gastrointestinal: Negative for abdominal pain and blood in stool.   Endocrine: Negative.    Genitourinary: Negative.    Musculoskeletal: Positive for neck pain. Negative for joint swelling.   Skin: Negative for color change, rash and wound.   Allergic/Immunologic: Negative.    Neurological: Negative for syncope and speech difficulty.   Hematological: Negative for adenopathy.   Psychiatric/Behavioral: Positive for decreased concentration and dysphoric mood. Negative for confusion, hallucinations and suicidal ideas. The patient is nervous/anxious.        Objective   /68   Pulse 67   Ht 165.1 cm (65\")   Wt 76.2 kg (168 lb)   SpO2 100%   BMI 27.96 kg/m²     Physical Exam   Constitutional: She appears well-developed and well-nourished.   HENT:   Head: Normocephalic.   Right Ear: Hearing, tympanic membrane, external ear and ear canal normal.   Left Ear: Hearing, tympanic membrane, external ear and ear canal normal.   Nose: Nose normal.   Mouth/Throat: Oropharynx is clear and moist.   Eyes: Conjunctivae are normal. Pupils are equal, round, and reactive to light.   Neck: Normal range of motion.   Cardiovascular: Normal rate, regular rhythm and normal heart sounds.   Pulmonary/Chest: Effort normal and breath sounds normal. She has no decreased breath sounds. She has no wheezes. She has no rhonchi. She has no rales.   Musculoskeletal:        Cervical back: She " exhibits decreased range of motion, tenderness and pain. She exhibits no bony tenderness, no swelling, no edema and no deformity.   Neurological: She is alert.   Skin: Skin is warm and dry.   Psychiatric: She has a normal mood and affect. Her behavior is normal.   Nursing note and vitals reviewed.        ASSESSMENT/PLAN    Problem List Items Addressed This Visit        Nervous and Auditory    Neck pain, bilateral     Improving with rest, muscle relaxer and chiropractor.            Other    Anxiety - Primary     Ordered prozac 10 mg daily, increase to 20 mg as needed. Pt will start if depression and anxiety continue.         Relevant Medications    FLUoxetine (PROZAC) 10 MG capsule               Return for Next scheduled follow up.

## 2019-03-09 NOTE — ASSESSMENT & PLAN NOTE
Ordered prozac 10 mg daily, increase to 20 mg as needed. Pt will start if depression and anxiety continue.

## 2019-05-13 ENCOUNTER — TRANSCRIBE ORDERS (OUTPATIENT)
Dept: ADMINISTRATIVE | Facility: HOSPITAL | Age: 44
End: 2019-05-13

## 2019-05-13 ENCOUNTER — PATIENT MESSAGE (OUTPATIENT)
Dept: INTERNAL MEDICINE | Facility: CLINIC | Age: 44
End: 2019-05-13

## 2019-05-13 DIAGNOSIS — Z12.31 VISIT FOR SCREENING MAMMOGRAM: Primary | ICD-10-CM

## 2019-05-17 DIAGNOSIS — E78.01 FAMILIAL HYPERCHOLESTEROLEMIA: ICD-10-CM

## 2019-05-17 RX ORDER — ROSUVASTATIN CALCIUM 5 MG/1
TABLET, COATED ORAL
Qty: 30 TABLET | Refills: 3 | Status: SHIPPED | OUTPATIENT
Start: 2019-05-17 | End: 2020-06-02 | Stop reason: SDUPTHER

## 2019-05-20 RX ORDER — TIZANIDINE HYDROCHLORIDE 6 MG/1
6 CAPSULE, GELATIN COATED ORAL 3 TIMES DAILY
Qty: 90 CAPSULE | Refills: 2 | Status: SHIPPED | OUTPATIENT
Start: 2019-05-20 | End: 2019-05-20 | Stop reason: SDUPTHER

## 2019-05-20 RX ORDER — TIZANIDINE HYDROCHLORIDE 6 MG/1
6 CAPSULE, GELATIN COATED ORAL 3 TIMES DAILY
Qty: 90 CAPSULE | Refills: 2 | Status: SHIPPED | OUTPATIENT
Start: 2019-05-20 | End: 2019-12-10 | Stop reason: SDUPTHER

## 2019-05-20 NOTE — TELEPHONE ENCOUNTER
From: Rona Thomson  Sent: 5/20/2019 9:21 AM EDT  To: FAROOQ Delgado  Subject: RE: Prescription Question    ----- Message from Thait, Generic sent at 5/20/2019 9:21 AM EDT -----    Zanaflex 6mg - 3x per day  I am almost out   ----- Message -----  From: FAROOQ Berrios  Sent: 5/13/2019 4:36 PM EDT  To: Rona Thomson  Subject: RE: Prescription Question  The medication requested was not linked to the email. Please let me know which one you are requesting and we will send it in.  Esthela Burden      ----- Message -----   From: Rona Thomson   Sent: 5/13/2019 10:07 AM EDT   To: FAROOQ Berrios  Subject: Prescription Question    I never did receive this med. I use [facility] so if it can be sent to them that'd be great. I am almost out.

## 2019-06-06 ENCOUNTER — TRANSCRIBE ORDERS (OUTPATIENT)
Dept: LAB | Facility: HOSPITAL | Age: 44
End: 2019-06-06

## 2019-06-06 ENCOUNTER — LAB (OUTPATIENT)
Dept: LAB | Facility: HOSPITAL | Age: 44
End: 2019-06-06

## 2019-06-06 DIAGNOSIS — Z11.4 SCREENING FOR HUMAN IMMUNODEFICIENCY VIRUS: ICD-10-CM

## 2019-06-06 DIAGNOSIS — Z11.3 SCREENING EXAMINATION FOR VENEREAL DISEASE: ICD-10-CM

## 2019-06-06 DIAGNOSIS — Z11.3 SCREENING EXAMINATION FOR VENEREAL DISEASE: Primary | ICD-10-CM

## 2019-06-06 PROCEDURE — 86645 CMV ANTIBODY IGM: CPT

## 2019-06-06 PROCEDURE — 86850 RBC ANTIBODY SCREEN: CPT

## 2019-06-06 PROCEDURE — 86787 VARICELLA-ZOSTER ANTIBODY: CPT

## 2019-06-06 PROCEDURE — 86644 CMV ANTIBODY: CPT

## 2019-06-06 PROCEDURE — 86704 HEP B CORE ANTIBODY TOTAL: CPT

## 2019-06-06 PROCEDURE — 86901 BLOOD TYPING SEROLOGIC RH(D): CPT

## 2019-06-06 PROCEDURE — 86762 RUBELLA ANTIBODY: CPT

## 2019-06-06 PROCEDURE — 36415 COLL VENOUS BLD VENIPUNCTURE: CPT

## 2019-06-06 PROCEDURE — 86703 HIV-1/HIV-2 1 RESULT ANTBDY: CPT

## 2019-06-06 PROCEDURE — 86900 BLOOD TYPING SEROLOGIC ABO: CPT

## 2019-06-06 PROCEDURE — 86780 TREPONEMA PALLIDUM: CPT

## 2019-06-07 LAB
ABO GROUP BLD: NORMAL
BLD GP AB SCN SERPL QL: NEGATIVE
CMV IGG SERPL IA-ACNC: 1.3 U/ML (ref 0–0.59)
CMV IGG SERPL IA-ACNC: 1.3 U/ML (ref 0–0.59)
CMV IGM SERPL IA-ACNC: <30 AU/ML (ref 0–29.9)
CMV IGM SERPL IA-ACNC: <30 AU/ML (ref 0–29.9)
RH BLD: POSITIVE
VZV IGG SER IA-ACNC: 399 INDEX

## 2019-06-08 LAB
C TRACH RRNA SPEC DONR QL NAA+PROBE: NEGATIVE
HBV CORE AB SER DONR QL IA: NEGATIVE
HBV SURFACE AG SERPL QL IA: NEGATIVE
HCV AB SER DONR QL: NEGATIVE
HIV 1+HCV RNA+HBV DNA SERPL QL NAA+PROBE: NORMAL
HIV1+2 AB SER DONR QL: NEGATIVE
HTLV I+II AB SER DONR QL: NEGATIVE
N GONORRHOEA RRNA SPEC DONR QL NAA+PROBE: NEGATIVE
RUBV IGG SERPL IA-ACNC: POSITIVE
T PALLIDUM IGG SER DONR QL IA: NON REACTIVE
VZV IGG SER IA-ACNC: POSITIVE

## 2019-07-01 ENCOUNTER — OFFICE VISIT (OUTPATIENT)
Dept: INTERNAL MEDICINE | Facility: CLINIC | Age: 44
End: 2019-07-01

## 2019-07-01 ENCOUNTER — HOSPITAL ENCOUNTER (OUTPATIENT)
Dept: MAMMOGRAPHY | Facility: HOSPITAL | Age: 44
Discharge: HOME | End: 2019-07-01
Admitting: OBSTETRICS & GYNECOLOGY

## 2019-07-01 VITALS
RESPIRATION RATE: 20 BRPM | DIASTOLIC BLOOD PRESSURE: 58 MMHG | BODY MASS INDEX: 26.69 KG/M2 | HEART RATE: 70 BPM | WEIGHT: 160.2 LBS | SYSTOLIC BLOOD PRESSURE: 100 MMHG | HEIGHT: 65 IN | OXYGEN SATURATION: 95 %

## 2019-07-01 DIAGNOSIS — E78.01 FAMILIAL HYPERCHOLESTEROLEMIA: ICD-10-CM

## 2019-07-01 DIAGNOSIS — Z12.31 VISIT FOR SCREENING MAMMOGRAM: ICD-10-CM

## 2019-07-01 DIAGNOSIS — Z00.00 HEALTH CARE MAINTENANCE: Primary | ICD-10-CM

## 2019-07-01 DIAGNOSIS — E55.9 VITAMIN D DEFICIENCY: ICD-10-CM

## 2019-07-01 PROCEDURE — 77067 SCR MAMMO BI INCL CAD: CPT | Performed by: RADIOLOGY

## 2019-07-01 PROCEDURE — 99396 PREV VISIT EST AGE 40-64: CPT | Performed by: INTERNAL MEDICINE

## 2019-07-01 PROCEDURE — 77067 SCR MAMMO BI INCL CAD: CPT

## 2019-07-01 PROCEDURE — 77063 BREAST TOMOSYNTHESIS BI: CPT | Performed by: RADIOLOGY

## 2019-07-01 PROCEDURE — 77063 BREAST TOMOSYNTHESIS BI: CPT

## 2019-07-01 NOTE — PROGRESS NOTES
"Chief Complaint   Patient presents with   • Annual Exam       Subjective   Rona Thomson is a 44 y.o. female.       History of Present Illness     The patient is being seen for a health maintenance evaluation.  The last health maintenance was 1 year(s) ago.    Social History  Rona  does not smoke cigarettes.   She drinks no alcohol.  She does not use illicit drugs.    General History  Rona  does have regular dental visits.  She does complain of vision problems. Last eye exam was 12/2018.  Immunizations are not up to date. The patient needs the following immunizations: declines hepatitis A vaccine    Lifestyle  Rona  consumes in general, a \"healthy\" diet  .  She exercises intermittently.    Reproductive Health  Rona  is premenopausal.  She reports periods are regular every 28-30 days.  She is sexually active. Her contraceptive plan is no method.    Screening  Last pap was 1/2019 by Dr Miller. History of abnormal pap smear or family history of gyn cancer: abnormal pap years ago with colposcopy, none since  Last mammogram was 2019. Personal or family history of abnormal mammograms or breast cancer: mother with breast cancer diagnosed in April; early stage  Last colonoscopy was 2018 by Dr Winter, repeat colonoscopy in 5 years. Family history of colon cancer: father with colon cancer of   Last DEXA was never.     She will be getting saliva test for genetic testing due to mother's recent diagnosis of breast cancer.    Start prozac and it has helped with her mood, plans to stop it this month in anticipation of upcoming IVF with donor eggs. Will also stop crestor.                 Current Outpatient Medications:   •  cetirizine (ZyrTEC) 10 MG tablet, Take 10 mg by mouth daily., Disp: , Rfl:   •  cholecalciferol (VITAMIN D3) 1000 UNITS tablet, Take 1,000 Units by mouth daily., Disp: , Rfl:   •  montelukast (SINGULAIR) 10 MG tablet, Take 1 tablet by mouth Daily., Disp: 30 tablet, Rfl: 5  •  omeprazole " "(priLOSEC) 40 MG capsule, Take 1 capsule by mouth Daily., Disp: , Rfl:   •  Prenatal Vit-Fe Fumarate-FA (PRENATAL, CLASSIC, VITAMIN) 28-0.8 MG tablet tablet, Take 1 tablet by mouth daily., Disp: , Rfl:   •  rosuvastatin (CRESTOR) 5 MG tablet, TAKE 1 TABLET DAILY, Disp: 30 tablet, Rfl: 3  •  TiZANidine (ZANAFLEX) 6 MG capsule, Take 1 capsule by mouth 3 (Three) Times a Day., Disp: 90 capsule, Rfl: 2  •  vitamin B-12 (CYANOCOBALAMIN) 1000 MCG tablet, Take 1,000 mcg by mouth daily., Disp: , Rfl:   •  acyclovir (ZOVIRAX) 5 % ointment, Apply  topically Every 4 (Four) Hours While Awake., Disp: 30 g, Rfl: 0  •  clonazePAM (KlonoPIN) 1 MG tablet, Take 1 mg by mouth as needed for anxiety., Disp: , Rfl:   •  FLUoxetine (PROZAC) 10 MG capsule, Take 1 capsule by mouth Daily., Disp: 30 capsule, Rfl: 3  •  valACYclovir (VALTREX) 1000 MG tablet, Take 2 tablets by mouth 2 (Two) Times a Day., Disp: 4 tablet, Rfl: 5     PMFSH  The following portions of the patient's history were reviewed and updated as appropriate: allergies, current medications, past family history, past medical history, past social history, past surgical history and problem list.    Review of Systems   Constitutional: Negative for appetite change, fever and unexpected weight change.   HENT: Negative for ear pain, facial swelling and sore throat.    Eyes: Negative for pain and visual disturbance.   Respiratory: Negative for chest tightness, shortness of breath and wheezing.    Cardiovascular: Negative for chest pain and palpitations.   Gastrointestinal: Negative for abdominal pain and blood in stool.   Endocrine: Negative.    Genitourinary: Negative for difficulty urinating and hematuria.   Musculoskeletal: Negative for joint swelling.   Neurological: Negative for tremors, seizures and syncope.   Hematological: Negative for adenopathy.   Psychiatric/Behavioral: Negative.        Objective   /58   Pulse 70   Resp 20   Ht 165.1 cm (65\")   Wt 72.7 kg (160 lb " 3.2 oz)   LMP 06/19/2019   SpO2 95%   BMI 26.66 kg/m²     Physical Exam   Constitutional: She is oriented to person, place, and time. She appears well-developed and well-nourished. No distress.   HENT:   Head: Normocephalic and atraumatic. Hair is normal.   Right Ear: Hearing, tympanic membrane, external ear and ear canal normal. No drainage. No decreased hearing is noted.   Left Ear: Hearing, tympanic membrane, external ear and ear canal normal. No decreased hearing is noted.   Nose: No nasal deformity.   Mouth/Throat: Oropharynx is clear and moist.   Eyes: Conjunctivae, EOM and lids are normal. Pupils are equal, round, and reactive to light. Lids are everted and swept, no foreign bodies found. Right eye exhibits no discharge. Left eye exhibits no discharge.   Fundoscopic exam:       The right eye shows red reflex.        The left eye shows red reflex.   Neck: Normal range of motion. Neck supple. No JVD present. No tracheal deviation present. No thyromegaly present.   Cardiovascular: Normal rate, regular rhythm, normal heart sounds, intact distal pulses and normal pulses. Exam reveals no gallop and no friction rub.   No murmur heard.  Pulmonary/Chest: Effort normal and breath sounds normal. No respiratory distress. She has no wheezes. She has no rales. She exhibits no tenderness.   Abdominal: Soft. Bowel sounds are normal. She exhibits no distension and no mass. There is no tenderness. There is no rebound and no guarding. No hernia.   Musculoskeletal: Normal range of motion. She exhibits no edema, tenderness or deformity.   Lymphadenopathy:     She has no cervical adenopathy.        Right: No inguinal adenopathy present.        Left: No inguinal adenopathy present.   Neurological: She is alert and oriented to person, place, and time. She has normal reflexes. She displays normal reflexes. No cranial nerve deficit. She exhibits normal muscle tone. Coordination normal.   Skin: Skin is warm and dry. No rash noted.  She is not diaphoretic. No erythema.   Psychiatric: She has a normal mood and affect. Her behavior is normal. Judgment and thought content normal.   Nursing note and vitals reviewed.           ASSESSMENT/PLAN    Problem List Items Addressed This Visit        Cardiovascular and Mediastinum    Familial hypercholesterolemia    Relevant Orders    Lipid Panel       Digestive    Vitamin D deficiency    Relevant Orders    Vitamin D 25 Hydroxy       Other    Health care maintenance - Primary     Immunizations: Td done 2010; declines hepatitis A; all others utd  Eye exam: done 2018  Pap Smear: done 1/2019  Mammogram: done 2019  Dexa: due post menopausal  Colonoscopy: done 2018, Dr Winter  Labs: fasting labs ordered    Counseled patient regarding multimodal approach with healthy nutrition, healthy sleep, regular physical activity, social activities, counseling, safety measures and medications.                  Relevant Orders    Comprehensive Metabolic Panel    Lipid Panel    CBC & Differential    TSH               Return in about 1 year (around 7/1/2020) for Annual.

## 2019-07-02 NOTE — ASSESSMENT & PLAN NOTE
Immunizations: Td done 2010; declines hepatitis A; all others utd  Eye exam: done 2018  Pap Smear: done 1/2019  Mammogram: done 2019  Dexa: due post menopausal  Colonoscopy: done 2018, Dr Winter  Labs: fasting labs ordered    Counseled patient regarding multimodal approach with healthy nutrition, healthy sleep, regular physical activity, social activities, counseling, safety measures and medications.

## 2019-07-03 LAB
25(OH)D3+25(OH)D2 SERPL-MCNC: 28.5 NG/ML (ref 30–100)
ALBUMIN SERPL-MCNC: 4.1 G/DL (ref 3.5–5.5)
ALBUMIN/GLOB SERPL: 1.8 {RATIO} (ref 1.2–2.2)
ALP SERPL-CCNC: 50 IU/L (ref 39–117)
ALT SERPL-CCNC: 15 IU/L (ref 0–32)
AMBIG ABBREV CMP14 DEFAULT: NORMAL
AMBIG ABBREV LP DEFAULT: NORMAL
AST SERPL-CCNC: 15 IU/L (ref 0–40)
BASOPHILS # BLD AUTO: 0.1 X10E3/UL (ref 0–0.2)
BASOPHILS NFR BLD AUTO: 1 %
BILIRUB SERPL-MCNC: 0.5 MG/DL (ref 0–1.2)
BUN SERPL-MCNC: 13 MG/DL (ref 6–24)
BUN/CREAT SERPL: 16 (ref 9–23)
CALCIUM SERPL-MCNC: 8.9 MG/DL (ref 8.7–10.2)
CHLORIDE SERPL-SCNC: 103 MMOL/L (ref 96–106)
CHOLEST SERPL-MCNC: 227 MG/DL (ref 100–199)
CO2 SERPL-SCNC: 25 MMOL/L (ref 20–29)
CREAT SERPL-MCNC: 0.8 MG/DL (ref 0.57–1)
EOSINOPHIL # BLD AUTO: 0.1 X10E3/UL (ref 0–0.4)
EOSINOPHIL NFR BLD AUTO: 2 %
ERYTHROCYTE [DISTWIDTH] IN BLOOD BY AUTOMATED COUNT: 12.1 % (ref 12.3–15.4)
GLOBULIN SER CALC-MCNC: 2.3 G/DL (ref 1.5–4.5)
GLUCOSE SERPL-MCNC: 86 MG/DL (ref 65–99)
HCT VFR BLD AUTO: 39.3 % (ref 34–46.6)
HDLC SERPL-MCNC: 63 MG/DL
HGB BLD-MCNC: 13.5 G/DL (ref 11.1–15.9)
LDLC SERPL CALC-MCNC: 146 MG/DL (ref 0–99)
LYMPHOCYTES # BLD AUTO: 1.9 X10E3/UL (ref 0.7–3.1)
LYMPHOCYTES NFR BLD AUTO: 28 %
MCH RBC QN AUTO: 33.1 PG (ref 26.6–33)
MCHC RBC AUTO-ENTMCNC: 34.4 G/DL (ref 31.5–35.7)
MCV RBC AUTO: 96 FL (ref 79–97)
MONOCYTES # BLD AUTO: 0.6 X10E3/UL (ref 0.1–0.9)
MONOCYTES NFR BLD AUTO: 9 %
NEUTROPHILS # BLD AUTO: 4.2 X10E3/UL (ref 1.4–7)
NEUTROPHILS NFR BLD AUTO: 60 %
PLATELET # BLD AUTO: 252 X10E3/UL (ref 150–450)
POTASSIUM SERPL-SCNC: 4.3 MMOL/L (ref 3.5–5.2)
PROT SERPL-MCNC: 6.4 G/DL (ref 6–8.5)
RBC # BLD AUTO: 4.08 X10E6/UL (ref 3.77–5.28)
SODIUM SERPL-SCNC: 138 MMOL/L (ref 134–144)
TRIGL SERPL-MCNC: 90 MG/DL (ref 0–149)
TSH SERPL DL<=0.005 MIU/L-ACNC: 1.28 UIU/ML (ref 0.45–4.5)
VLDLC SERPL CALC-MCNC: 18 MG/DL (ref 5–40)
WBC # BLD AUTO: 6.9 X10E3/UL (ref 3.4–10.8)

## 2019-07-10 DIAGNOSIS — F41.9 ANXIETY: ICD-10-CM

## 2019-07-10 RX ORDER — FLUOXETINE 10 MG/1
CAPSULE ORAL
Qty: 30 CAPSULE | Refills: 0 | Status: SHIPPED | OUTPATIENT
Start: 2019-07-10 | End: 2019-08-07 | Stop reason: SDUPTHER

## 2019-07-19 NOTE — PROGRESS NOTES
Your recent labs show that your cholesterol is better than it was a year ago but somewhat higher than it was 9 months ago. This could be related to being on vacation the week prior to the blood work. Everything else looks fine!

## 2019-08-07 DIAGNOSIS — F41.9 ANXIETY: ICD-10-CM

## 2019-08-07 RX ORDER — FLUOXETINE 10 MG/1
CAPSULE ORAL
Qty: 30 CAPSULE | Refills: 3 | Status: SHIPPED | OUTPATIENT
Start: 2019-08-07 | End: 2019-12-10 | Stop reason: SDUPTHER

## 2019-08-19 ENCOUNTER — OFFICE VISIT (OUTPATIENT)
Dept: INTERNAL MEDICINE | Facility: CLINIC | Age: 44
End: 2019-08-19

## 2019-08-19 VITALS
OXYGEN SATURATION: 100 % | HEART RATE: 79 BPM | TEMPERATURE: 98.2 F | DIASTOLIC BLOOD PRESSURE: 50 MMHG | SYSTOLIC BLOOD PRESSURE: 98 MMHG | BODY MASS INDEX: 28.32 KG/M2 | WEIGHT: 170 LBS | HEIGHT: 65 IN

## 2019-08-19 DIAGNOSIS — M77.8 RIGHT ELBOW TENDONITIS: Primary | ICD-10-CM

## 2019-08-19 PROCEDURE — 99213 OFFICE O/P EST LOW 20 MIN: CPT | Performed by: PHYSICIAN ASSISTANT

## 2019-08-19 NOTE — PROGRESS NOTES
Chief Complaint   Patient presents with   • Elbow Pain     x1 month wants to see about getting a scan ASAP       Subjective   Rona Thomson is a 44 y.o. female.       History of Present Illness     Pt has been having pain in her right elbow. Has aching from her biceps to her forearm. Worse when she twists or turns her wrist. 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 Pain seems to originate in her lateral elbow. Has tried to rest and has been wearing a brace on her forearm. Nothing seems to help.        Current Outpatient Medications:   •  acyclovir (ZOVIRAX) 5 % ointment, Apply  topically Every 4 (Four) Hours While Awake., Disp: 30 g, Rfl: 0  •  cetirizine (ZyrTEC) 10 MG tablet, Take 10 mg by mouth daily., Disp: , Rfl:   •  cholecalciferol (VITAMIN D3) 1000 UNITS tablet, Take 1,000 Units by mouth daily., Disp: , Rfl:   •  montelukast (SINGULAIR) 10 MG tablet, Take 1 tablet by mouth Daily., Disp: 30 tablet, Rfl: 5  •  Prenatal Vit-Fe Fumarate-FA (PRENATAL, CLASSIC, VITAMIN) 28-0.8 MG tablet tablet, Take 1 tablet by mouth daily., Disp: , Rfl:   •  TiZANidine (ZANAFLEX) 6 MG capsule, Take 1 capsule by mouth 3 (Three) Times a Day., Disp: 90 capsule, Rfl: 2  •  valACYclovir (VALTREX) 1000 MG tablet, Take 2 tablets by mouth 2 (Two) Times a Day., Disp: 4 tablet, Rfl: 5  •  vitamin B-12 (CYANOCOBALAMIN) 1000 MCG tablet, Take 1,000 mcg by mouth daily., Disp: , Rfl:   •  clonazePAM (KlonoPIN) 1 MG tablet, Take 1 mg by mouth as needed for anxiety., Disp: , Rfl:   •  diclofenac (VOLTAREN) 1 % gel gel, Apply 4 g topically to the appropriate area as directed 4 (Four) Times a Day As Needed (right elbow pain)., Disp: 100 g, Rfl: 0  •  FLUoxetine (PROzac) 10 MG capsule, TAKE 1 CAPSULE BY MOUTH EVERY DAY, Disp: 30 capsule, Rfl: 3  •  omeprazole (priLOSEC) 40 MG capsule, Take 1 capsule by mouth Daily., Disp: , Rfl:   •  rosuvastatin (CRESTOR) 5 MG tablet, TAKE 1 TABLET DAILY, Disp: 30  "tablet, Rfl: 3     PMFSH  The following portions of the patient's history were reviewed and updated as appropriate: allergies, current medications, past family history, past medical history, past social history, past surgical history and problem list.    Review of Systems   Constitutional: Negative for activity change, appetite change and fatigue.   HENT: Negative for congestion and rhinorrhea.    Respiratory: Negative for chest tightness and shortness of breath.    Cardiovascular: Negative for chest pain and palpitations.   Gastrointestinal: Negative for abdominal pain.   Genitourinary: Negative for dysuria.   Musculoskeletal: Positive for arthralgias and myalgias.   Neurological: Negative for dizziness, weakness, light-headedness and headaches.   Psychiatric/Behavioral: Negative for dysphoric mood. The patient is not nervous/anxious.        Objective   BP 98/50   Pulse 79   Temp 98.2 °F (36.8 °C)   Ht 165.1 cm (65\")   Wt 77.1 kg (170 lb)   SpO2 100%   BMI 28.29 kg/m²     Physical Exam   Constitutional: She appears well-developed and well-nourished.   HENT:   Head: Normocephalic.   Right Ear: Hearing, tympanic membrane, external ear and ear canal normal.   Left Ear: Hearing, tympanic membrane, external ear and ear canal normal.   Nose: Nose normal.   Mouth/Throat: Oropharynx is clear and moist.   Eyes: Conjunctivae are normal. Pupils are equal, round, and reactive to light.   Neck: Normal range of motion.   Cardiovascular: Normal rate, regular rhythm and normal heart sounds.   Pulmonary/Chest: Effort normal and breath sounds normal. She has no decreased breath sounds. She has no wheezes. She has no rhonchi. She has no rales.   Musculoskeletal: Normal range of motion.        Right elbow: She exhibits normal range of motion, no swelling and no effusion. Tenderness found. Lateral epicondyle tenderness noted.   Neurological: She is alert.   Skin: Skin is warm and dry.   Psychiatric: She has a normal mood and " affect. Her behavior is normal.   Nursing note and vitals reviewed.      ASSESSMENT/PLAN    Problem List Items Addressed This Visit     None      Visit Diagnoses     Right elbow tendonitis    -  Primary    Trial of voltaren gel. Refer to PT for eval. Discussed next step of ortho referral.    Relevant Medications    diclofenac (VOLTAREN) 1 % gel gel    Other Relevant Orders    Ambulatory Referral to Physical Therapy Evaluate and treat               Return if symptoms worsen or fail to improve.

## 2019-09-17 DIAGNOSIS — M77.8 RIGHT ELBOW TENDONITIS: ICD-10-CM

## 2019-10-04 ENCOUNTER — APPOINTMENT (OUTPATIENT)
Dept: LAB | Facility: HOSPITAL | Age: 44
End: 2019-10-04

## 2019-10-04 ENCOUNTER — TRANSCRIBE ORDERS (OUTPATIENT)
Dept: LAB | Facility: HOSPITAL | Age: 44
End: 2019-10-04

## 2019-10-04 DIAGNOSIS — Z32.00 PREGNANCY EXAMINATION OR TEST, PREGNANCY UNCONFIRMED: Primary | ICD-10-CM

## 2019-10-04 LAB — HCG INTACT+B SERPL-ACNC: <0.5 MIU/ML

## 2019-10-04 PROCEDURE — 36415 COLL VENOUS BLD VENIPUNCTURE: CPT | Performed by: SPECIALIST

## 2019-10-04 PROCEDURE — 84702 CHORIONIC GONADOTROPIN TEST: CPT | Performed by: SPECIALIST

## 2019-12-10 ENCOUNTER — OFFICE VISIT (OUTPATIENT)
Dept: INTERNAL MEDICINE | Facility: CLINIC | Age: 44
End: 2019-12-10

## 2019-12-10 VITALS
OXYGEN SATURATION: 100 % | WEIGHT: 175.4 LBS | DIASTOLIC BLOOD PRESSURE: 80 MMHG | BODY MASS INDEX: 29.19 KG/M2 | HEART RATE: 75 BPM | SYSTOLIC BLOOD PRESSURE: 110 MMHG

## 2019-12-10 DIAGNOSIS — J30.9 ALLERGIC RHINITIS, UNSPECIFIED SEASONALITY, UNSPECIFIED TRIGGER: ICD-10-CM

## 2019-12-10 DIAGNOSIS — M54.6 CHRONIC BILATERAL THORACIC BACK PAIN: Primary | ICD-10-CM

## 2019-12-10 DIAGNOSIS — G89.29 CHRONIC BILATERAL THORACIC BACK PAIN: Primary | ICD-10-CM

## 2019-12-10 DIAGNOSIS — F41.9 ANXIETY: ICD-10-CM

## 2019-12-10 DIAGNOSIS — M54.50 ACUTE BILATERAL LOW BACK PAIN WITHOUT SCIATICA: ICD-10-CM

## 2019-12-10 PROCEDURE — 99213 OFFICE O/P EST LOW 20 MIN: CPT | Performed by: PHYSICIAN ASSISTANT

## 2019-12-10 RX ORDER — MONTELUKAST SODIUM 10 MG/1
10 TABLET ORAL DAILY
Qty: 90 TABLET | Refills: 1 | Status: SHIPPED | OUTPATIENT
Start: 2019-12-10 | End: 2020-12-09

## 2019-12-10 RX ORDER — OMEPRAZOLE 40 MG/1
40 CAPSULE, DELAYED RELEASE ORAL DAILY
Qty: 90 CAPSULE | Refills: 1 | Status: SHIPPED | OUTPATIENT
Start: 2019-12-10 | End: 2022-07-12 | Stop reason: ALTCHOICE

## 2019-12-10 RX ORDER — ESTRADIOL 0.1 MG/D
FILM, EXTENDED RELEASE TRANSDERMAL
Refills: 5 | COMMUNITY
Start: 2019-10-26 | End: 2023-01-11

## 2019-12-10 RX ORDER — FLUOXETINE 10 MG/1
CAPSULE ORAL
Qty: 30 CAPSULE | Refills: 3 | Status: SHIPPED | OUTPATIENT
Start: 2019-12-10 | End: 2021-07-09

## 2019-12-10 RX ORDER — TIZANIDINE HYDROCHLORIDE 6 MG/1
6 CAPSULE, GELATIN COATED ORAL 3 TIMES DAILY
Qty: 270 CAPSULE | Refills: 1 | Status: SHIPPED | OUTPATIENT
Start: 2019-12-10

## 2019-12-10 NOTE — PROGRESS NOTES
Chief Complaint   Patient presents with   • Back Pain     Acute        Subjective   Rona Thomson is a 44 y.o. female.       History of Present Illness     Pt has been having back pain for months, doing stretching without any improvement. Has been going back to the chiropractor for some lower back pain, he is not treating the thoracic pain. In October she went on a trip and did a lot of bending that triggered her lower back pain. May be getting a MRI of lumbar spine.    Her chiropractor has not tried to address her back and rib pain. Has pain across her thoracic spine and feels pinching in her right ribs. Twisting makes it worse. She lays on the heating pad and it does not help, muscle relaxers help her sleep but she wakes up and feels the same way.    She saw Dr Winter and was suggested she may have gastritis and she restarted her omeprazole a couple weeks ago.         Current Outpatient Medications:   •  acyclovir (ZOVIRAX) 5 % ointment, Apply  topically Every 4 (Four) Hours While Awake., Disp: 30 g, Rfl: 0  •  cetirizine (ZyrTEC) 10 MG tablet, Take 10 mg by mouth daily., Disp: , Rfl:   •  cholecalciferol (VITAMIN D3) 1000 UNITS tablet, Take 1,000 Units by mouth daily., Disp: , Rfl:   •  diclofenac (VOLTAREN) 1 % gel gel, PLEASE SEE ATTACHED FOR DETAILED DIRECTIONS, Disp: 100 g, Rfl: 0  •  montelukast (SINGULAIR) 10 MG tablet, Take 1 tablet by mouth Daily., Disp: 90 tablet, Rfl: 1  •  omeprazole (priLOSEC) 40 MG capsule, Take 1 capsule by mouth Daily., Disp: 90 capsule, Rfl: 1  •  Prenatal Vit-Fe Fumarate-FA (PRENATAL, CLASSIC, VITAMIN) 28-0.8 MG tablet tablet, Take 1 tablet by mouth daily., Disp: , Rfl:   •  rosuvastatin (CRESTOR) 5 MG tablet, TAKE 1 TABLET DAILY, Disp: 30 tablet, Rfl: 3  •  TiZANidine (ZANAFLEX) 6 MG capsule, Take 1 capsule by mouth 3 (Three) Times a Day., Disp: 270 capsule, Rfl: 1  •  valACYclovir (VALTREX) 1000 MG tablet, Take 2 tablets by mouth 2 (Two) Times a Day., Disp: 4 tablet, Rfl:  5  •  vitamin B-12 (CYANOCOBALAMIN) 1000 MCG tablet, Take 1,000 mcg by mouth daily., Disp: , Rfl:   •  clonazePAM (KlonoPIN) 1 MG tablet, Take 1 mg by mouth as needed for anxiety., Disp: , Rfl:   •  estradiol (MINIVELLE, VIVELLE-DOT) 0.1 MG/24HR patch, APPLY 1 TO STOMACH EVERY 3&1/2 DAYS, Disp: , Rfl: 5  •  FLUoxetine (PROzac) 10 MG capsule, TAKE 1 CAPSULE BY MOUTH EVERY DAY, Disp: 30 capsule, Rfl: 3  •  SPRINTEC 28 0.25-35 MG-MCG per tablet, TAKE 1 TABLET BY MOUTH EVERY DAY SKIP PLACEBO, Disp: , Rfl: 5     PMFSH  The following portions of the patient's history were reviewed and updated as appropriate: allergies, current medications, past family history, past medical history, past social history, past surgical history and problem list.    Review of Systems   Constitutional: Negative for appetite change, fever and unexpected weight change.   HENT: Negative.    Eyes: Negative for pain and visual disturbance.   Respiratory: Negative for chest tightness, shortness of breath and wheezing.    Cardiovascular: Negative for chest pain and palpitations.   Gastrointestinal: Negative for abdominal pain, blood in stool, diarrhea, nausea and vomiting.   Endocrine: Negative.    Genitourinary: Negative for difficulty urinating, flank pain, frequency and urgency.   Musculoskeletal: Positive for back pain. Negative for joint swelling.   Skin: Negative for color change and rash.   Neurological: Negative for tremors and weakness.   Hematological: Negative for adenopathy.   Psychiatric/Behavioral: Negative for confusion and decreased concentration.   All other systems reviewed and are negative.      Objective   /80   Pulse 75   Wt 79.6 kg (175 lb 6.4 oz)   SpO2 100%   BMI 29.19 kg/m²     Physical Exam   Constitutional: She is oriented to person, place, and time. She appears well-developed and well-nourished. No distress.   HENT:   Head: Normocephalic and atraumatic.   Eyes: Pupils are equal, round, and reactive to light.  Conjunctivae and EOM are normal. No scleral icterus.   Neck: Normal range of motion. Neck supple.   Cardiovascular: Normal rate, regular rhythm and normal heart sounds. Exam reveals no gallop.   No murmur heard.  Pulmonary/Chest: Effort normal and breath sounds normal. No respiratory distress. She has no wheezes. She has no rales. She exhibits no tenderness.   Abdominal: Soft. There is no tenderness.   Musculoskeletal: She exhibits tenderness. She exhibits no deformity.        Thoracic back: She exhibits tenderness and pain. She exhibits normal range of motion, no bony tenderness, no swelling and no edema.   Neurological: She is alert and oriented to person, place, and time. She has normal reflexes. She displays no atrophy, no tremor and normal reflexes. No sensory deficit. She exhibits normal muscle tone. Coordination normal.   Reflex Scores:       Patellar reflexes are 2+ on the right side and 2+ on the left side.       Achilles reflexes are 2+ on the right side and 2+ on the left side.  Skin: Skin is warm and dry. No rash noted. She is not diaphoretic.   Psychiatric: She has a normal mood and affect. Her behavior is normal. Judgment and thought content normal.   Nursing note and vitals reviewed.      ASSESSMENT/PLAN    Problem List Items Addressed This Visit        Respiratory    Allergic rhinitis    Relevant Medications    montelukast (SINGULAIR) 10 MG tablet       Nervous and Auditory    Low back pain    Relevant Orders    Ambulatory Referral to Physical Therapy Evaluate and treat (Completed)      Other Visit Diagnoses     Chronic bilateral thoracic back pain    -  Primary    Check xray of thoracic spine and chest. Refer to PT for eval and treatment.    Relevant Orders    XR Spine Thoracic 3 View (In Office)    XR Chest PA & Lateral    Ambulatory Referral to Physical Therapy Evaluate and treat (Completed)               Return if symptoms worsen or fail to improve.

## 2019-12-20 ENCOUNTER — HOSPITAL ENCOUNTER (OUTPATIENT)
Dept: GENERAL RADIOLOGY | Facility: HOSPITAL | Age: 44
Discharge: HOME | End: 2019-12-20
Admitting: PHYSICIAN ASSISTANT

## 2019-12-20 ENCOUNTER — HOSPITAL ENCOUNTER (OUTPATIENT)
Dept: GENERAL RADIOLOGY | Facility: HOSPITAL | Age: 44
Discharge: HOME | End: 2019-12-20

## 2019-12-20 DIAGNOSIS — M54.6 CHRONIC BILATERAL THORACIC BACK PAIN: ICD-10-CM

## 2019-12-20 DIAGNOSIS — G89.29 CHRONIC BILATERAL THORACIC BACK PAIN: ICD-10-CM

## 2019-12-20 PROCEDURE — 72072 X-RAY EXAM THORAC SPINE 3VWS: CPT | Performed by: RADIOLOGY

## 2019-12-20 PROCEDURE — 71046 X-RAY EXAM CHEST 2 VIEWS: CPT

## 2019-12-20 PROCEDURE — 72072 X-RAY EXAM THORAC SPINE 3VWS: CPT

## 2019-12-20 PROCEDURE — 71046 X-RAY EXAM CHEST 2 VIEWS: CPT | Performed by: RADIOLOGY

## 2019-12-21 NOTE — PROGRESS NOTES
The xray of your back shows mild scoliosis and mild arthritis. Hopefully the physical therapy will help!

## 2020-06-02 ENCOUNTER — TELEMEDICINE (OUTPATIENT)
Dept: INTERNAL MEDICINE | Facility: CLINIC | Age: 45
End: 2020-06-02

## 2020-06-02 DIAGNOSIS — G89.29 CHRONIC BILATERAL LOW BACK PAIN WITHOUT SCIATICA: Primary | ICD-10-CM

## 2020-06-02 DIAGNOSIS — M54.50 CHRONIC BILATERAL LOW BACK PAIN WITHOUT SCIATICA: Primary | ICD-10-CM

## 2020-06-02 DIAGNOSIS — E78.01 FAMILIAL HYPERCHOLESTEROLEMIA: ICD-10-CM

## 2020-06-02 PROCEDURE — 99213 OFFICE O/P EST LOW 20 MIN: CPT | Performed by: PHYSICIAN ASSISTANT

## 2020-06-02 RX ORDER — ROSUVASTATIN CALCIUM 5 MG/1
5 TABLET, COATED ORAL DAILY
Qty: 90 TABLET | Refills: 3 | Status: SHIPPED | OUTPATIENT
Start: 2020-06-02 | End: 2021-12-29 | Stop reason: SDUPTHER

## 2020-06-02 NOTE — PROGRESS NOTES
Chief Complaint   Patient presents with   • Back Pain       Subjective   Rona Thomson is a 44 y.o. female.       History of Present Illness     This was an audio and video enabled telemedicine encounter.    Pt has been seeing chiropractor for her lower back pain but the treatments stopped working as well recently. Her chiropractor referred her for MRI. Showed several disc bulges. Now doing decompression treatments that she does feel are helping. However, she would like to pursue some additional pain treatment.  She would like a referral to [facility] [phone], fax number.    Has had injections in her back before, about 10 years ago, and they worked very well. She is planning to have another surgery for endometriosis and then another round of IVF. Wants to have her back at its best before starting these.         Current Outpatient Medications:   •  acyclovir (ZOVIRAX) 5 % ointment, Apply  topically Every 4 (Four) Hours While Awake., Disp: 30 g, Rfl: 0  •  cetirizine (ZyrTEC) 10 MG tablet, Take 10 mg by mouth daily., Disp: , Rfl:   •  cholecalciferol (VITAMIN D3) 1000 UNITS tablet, Take 1,000 Units by mouth daily., Disp: , Rfl:   •  clonazePAM (KlonoPIN) 1 MG tablet, Take 1 mg by mouth as needed for anxiety., Disp: , Rfl:   •  diclofenac (VOLTAREN) 1 % gel gel, PLEASE SEE ATTACHED FOR DETAILED DIRECTIONS, Disp: 100 g, Rfl: 0  •  estradiol (MINIVELLE, VIVELLE-DOT) 0.1 MG/24HR patch, APPLY 1 TO STOMACH EVERY 3&1/2 DAYS, Disp: , Rfl: 5  •  FLUoxetine (PROzac) 10 MG capsule, TAKE 1 CAPSULE BY MOUTH EVERY DAY, Disp: 30 capsule, Rfl: 3  •  montelukast (SINGULAIR) 10 MG tablet, Take 1 tablet by mouth Daily., Disp: 90 tablet, Rfl: 1  •  omeprazole (priLOSEC) 40 MG capsule, Take 1 capsule by mouth Daily., Disp: 90 capsule, Rfl: 1  •  Prenatal Vit-Fe Fumarate-FA (PRENATAL, CLASSIC, VITAMIN) 28-0.8 MG tablet tablet, Take 1 tablet by mouth daily., Disp: , Rfl:   •  rosuvastatin (CRESTOR) 5 MG tablet,  Take 1 tablet by mouth Daily., Disp: 90 tablet, Rfl: 3  •  SPRINTEC 28 0.25-35 MG-MCG per tablet, TAKE 1 TABLET BY MOUTH EVERY DAY SKIP PLACEBO, Disp: , Rfl: 5  •  TiZANidine (ZANAFLEX) 6 MG capsule, Take 1 capsule by mouth 3 (Three) Times a Day., Disp: 270 capsule, Rfl: 1  •  valACYclovir (VALTREX) 1000 MG tablet, Take 2 tablets by mouth 2 (Two) Times a Day., Disp: 4 tablet, Rfl: 5  •  vitamin B-12 (CYANOCOBALAMIN) 1000 MCG tablet, Take 1,000 mcg by mouth daily., Disp: , Rfl:      PMFSH  The following portions of the patient's history were reviewed and updated as appropriate: allergies, current medications, past family history, past medical history, past social history, past surgical history and problem list.    Review of Systems   Constitutional: Negative for appetite change, fever and unexpected weight change.   HENT: Negative.    Eyes: Negative for pain and visual disturbance.   Respiratory: Negative for chest tightness, shortness of breath and wheezing.    Cardiovascular: Negative for chest pain and palpitations.   Gastrointestinal: Negative for abdominal pain, blood in stool, diarrhea, nausea and vomiting.   Endocrine: Negative.    Genitourinary: Negative for difficulty urinating, flank pain, frequency and urgency.   Musculoskeletal: Positive for back pain. Negative for joint swelling.   Skin: Negative for color change and rash.   Neurological: Negative for tremors and weakness.   Hematological: Negative for adenopathy.   Psychiatric/Behavioral: Negative for confusion and decreased concentration.   All other systems reviewed and are negative.      Objective   There were no vitals taken for this visit.    Physical Exam   Constitutional: She is oriented to person, place, and time. She appears well-developed and well-nourished.   HENT:   Head: Normocephalic and atraumatic.   Right Ear: External ear normal.   Left Ear: External ear normal.   Nose: Nose normal.   Eyes: Conjunctivae and EOM are normal.   Neck:  Normal range of motion.   Cardiovascular: Normal rate.   Pulmonary/Chest: Effort normal.   Musculoskeletal: Normal range of motion.   Neurological: She is alert and oriented to person, place, and time.   Psychiatric: She has a normal mood and affect. Her behavior is normal. Judgment and thought content normal.         ASSESSMENT/PLAN    Problem List Items Addressed This Visit        Cardiovascular and Mediastinum    Familial hypercholesterolemia    Relevant Medications    rosuvastatin (CRESTOR) 5 MG tablet       Nervous and Auditory    Low back pain - Primary     Refer to pain management as directed.         Relevant Orders    Ambulatory Referral to Pain Management               Return for Next scheduled follow up.

## 2020-07-02 ENCOUNTER — LAB (OUTPATIENT)
Dept: LAB | Facility: HOSPITAL | Age: 45
End: 2020-07-02

## 2020-07-02 ENCOUNTER — OFFICE VISIT (OUTPATIENT)
Dept: INTERNAL MEDICINE | Facility: CLINIC | Age: 45
End: 2020-07-02

## 2020-07-02 VITALS
DIASTOLIC BLOOD PRESSURE: 74 MMHG | HEART RATE: 70 BPM | TEMPERATURE: 98.4 F | WEIGHT: 174.4 LBS | HEIGHT: 65 IN | SYSTOLIC BLOOD PRESSURE: 112 MMHG | BODY MASS INDEX: 29.06 KG/M2 | OXYGEN SATURATION: 98 % | RESPIRATION RATE: 16 BRPM

## 2020-07-02 DIAGNOSIS — R71.8 MEAN RED BLOOD CELL VOLUME INCREASED: ICD-10-CM

## 2020-07-02 DIAGNOSIS — Z11.59 ENCOUNTER FOR HEPATITIS C SCREENING TEST FOR LOW RISK PATIENT: ICD-10-CM

## 2020-07-02 DIAGNOSIS — Z00.00 HEALTH CARE MAINTENANCE: Primary | ICD-10-CM

## 2020-07-02 DIAGNOSIS — Z80.9 FAMILY HISTORY OF CANCER: ICD-10-CM

## 2020-07-02 DIAGNOSIS — Z00.00 HEALTH CARE MAINTENANCE: ICD-10-CM

## 2020-07-02 LAB
BASOPHILS # BLD AUTO: 0.07 10*3/MM3 (ref 0–0.2)
BASOPHILS NFR BLD AUTO: 1 % (ref 0–1.5)
CHOLEST SERPL-MCNC: 217 MG/DL (ref 0–200)
DEPRECATED RDW RBC AUTO: 42.8 FL (ref 37–54)
EOSINOPHIL # BLD AUTO: 0.08 10*3/MM3 (ref 0–0.4)
EOSINOPHIL NFR BLD AUTO: 1.1 % (ref 0.3–6.2)
ERYTHROCYTE [DISTWIDTH] IN BLOOD BY AUTOMATED COUNT: 12 % (ref 12.3–15.4)
FERRITIN SERPL-MCNC: 49 NG/ML (ref 13–150)
FOLATE SERPL-MCNC: 17.6 NG/ML (ref 4.78–24.2)
HCT VFR BLD AUTO: 42.4 % (ref 34–46.6)
HCV AB SER DONR QL: NORMAL
HDLC SERPL-MCNC: 55 MG/DL (ref 40–60)
HGB BLD-MCNC: 14.4 G/DL (ref 12–15.9)
IMM GRANULOCYTES # BLD AUTO: 0.02 10*3/MM3 (ref 0–0.05)
IMM GRANULOCYTES NFR BLD AUTO: 0.3 % (ref 0–0.5)
IRON 24H UR-MRATE: 131 MCG/DL (ref 37–145)
IRON SATN MFR SERPL: 40 % (ref 20–50)
LDLC SERPL CALC-MCNC: 142 MG/DL (ref 0–100)
LDLC/HDLC SERPL: 2.57 {RATIO}
LYMPHOCYTES # BLD AUTO: 2.39 10*3/MM3 (ref 0.7–3.1)
LYMPHOCYTES NFR BLD AUTO: 33.1 % (ref 19.6–45.3)
MCH RBC QN AUTO: 32.9 PG (ref 26.6–33)
MCHC RBC AUTO-ENTMCNC: 34 G/DL (ref 31.5–35.7)
MCV RBC AUTO: 96.8 FL (ref 79–97)
MONOCYTES # BLD AUTO: 0.72 10*3/MM3 (ref 0.1–0.9)
MONOCYTES NFR BLD AUTO: 10 % (ref 5–12)
NEUTROPHILS NFR BLD AUTO: 3.93 10*3/MM3 (ref 1.7–7)
NEUTROPHILS NFR BLD AUTO: 54.5 % (ref 42.7–76)
NRBC BLD AUTO-RTO: 0 /100 WBC (ref 0–0.2)
PLATELET # BLD AUTO: 284 10*3/MM3 (ref 140–450)
PMV BLD AUTO: 11.1 FL (ref 6–12)
RBC # BLD AUTO: 4.38 10*6/MM3 (ref 3.77–5.28)
TIBC SERPL-MCNC: 328 MCG/DL (ref 298–536)
TRANSFERRIN SERPL-MCNC: 220 MG/DL (ref 200–360)
TRIGL SERPL-MCNC: 102 MG/DL (ref 0–150)
TSH SERPL DL<=0.05 MIU/L-ACNC: 1.46 UIU/ML (ref 0.27–4.2)
VLDLC SERPL-MCNC: 20.4 MG/DL (ref 5–40)
WBC # BLD AUTO: 7.21 10*3/MM3 (ref 3.4–10.8)

## 2020-07-02 PROCEDURE — 86803 HEPATITIS C AB TEST: CPT | Performed by: PHYSICIAN ASSISTANT

## 2020-07-02 PROCEDURE — 85025 COMPLETE CBC W/AUTO DIFF WBC: CPT | Performed by: PHYSICIAN ASSISTANT

## 2020-07-02 PROCEDURE — 36415 COLL VENOUS BLD VENIPUNCTURE: CPT

## 2020-07-02 PROCEDURE — 99396 PREV VISIT EST AGE 40-64: CPT | Performed by: PHYSICIAN ASSISTANT

## 2020-07-02 PROCEDURE — 84443 ASSAY THYROID STIM HORMONE: CPT | Performed by: PHYSICIAN ASSISTANT

## 2020-07-02 PROCEDURE — 83540 ASSAY OF IRON: CPT | Performed by: PHYSICIAN ASSISTANT

## 2020-07-02 PROCEDURE — 84466 ASSAY OF TRANSFERRIN: CPT | Performed by: PHYSICIAN ASSISTANT

## 2020-07-02 PROCEDURE — 80061 LIPID PANEL: CPT | Performed by: PHYSICIAN ASSISTANT

## 2020-07-02 PROCEDURE — 82728 ASSAY OF FERRITIN: CPT | Performed by: PHYSICIAN ASSISTANT

## 2020-07-02 PROCEDURE — 82746 ASSAY OF FOLIC ACID SERUM: CPT | Performed by: PHYSICIAN ASSISTANT

## 2020-07-02 PROCEDURE — 80053 COMPREHEN METABOLIC PANEL: CPT | Performed by: PHYSICIAN ASSISTANT

## 2020-07-02 RX ORDER — FERROUS SULFATE 325(65) MG
1 TABLET ORAL DAILY
COMMUNITY
Start: 2020-06-17 | End: 2023-01-11

## 2020-07-02 NOTE — PROGRESS NOTES
"Chief Complaint   Patient presents with   • Annual Exam       Subjective   Rona Thomson is a 45 y.o. female.       History of Present Illness     The patient is being seen for a health maintenance evaluation.  The last health maintenance was 1 year(s) ago.    Social History  Rona  does not smoke cigarettes.   She drinks no alcohol.  She does not use illicit drugs.    General History  Rona  does have regular dental visits.  She does complain of vision problems. Last eye exam was 2019.  Immunizations are not up to date. The patient needs the following immunizations: TDaP needed    Lifestyle  Rona  consumes in general, a \"healthy\" diet  .  She exercises rarely.    Reproductive Health  Rona  is premenopausal.  She reports periods are regular every 28-30 days.  She is sexually active. Her contraceptive plan is no method.    Screening  Last pap was 1/2019 by Dr Miller. History of abnormal pap smear or family history of gyn cancer: none  Last mammogram was  7/2019. Personal or family history of abnormal mammograms or breast cancer: mother with breast cancer at age 62  Last colonoscopy was 2018 by Moy. Family history of colon cancer: father with colon cancer  Last DEXA was never.     Pt has seen a hormone specialist and was told that her iron was extremely low. Started her on iron supplementation. She has brought labs that show low ferritin levels. She has been taking it for about a week.    She has been doing therapy on her back. Has scheduled an appointment with the pain management provider.     She is back on the statin, taking daily..             Current Outpatient Medications:   •  acyclovir (ZOVIRAX) 5 % ointment, Apply  topically Every 4 (Four) Hours While Awake., Disp: 30 g, Rfl: 0  •  cetirizine (ZyrTEC) 10 MG tablet, Take 10 mg by mouth daily., Disp: , Rfl:   •  cholecalciferol (VITAMIN D3) 1000 UNITS tablet, Take 1,000 Units by mouth daily., Disp: , Rfl:   •  diclofenac (VOLTAREN) 1 % gel " gel, PLEASE SEE ATTACHED FOR DETAILED DIRECTIONS, Disp: 100 g, Rfl: 0  •  estradiol (MINIVELLE, VIVELLE-DOT) 0.1 MG/24HR patch, APPLY 1 TO STOMACH EVERY 3&1/2 DAYS, Disp: , Rfl: 5  •  ferrous sulfate 325 (65 FE) MG tablet, Take 1 tablet by mouth Daily., Disp: , Rfl:   •  montelukast (SINGULAIR) 10 MG tablet, Take 1 tablet by mouth Daily., Disp: 90 tablet, Rfl: 1  •  rosuvastatin (CRESTOR) 5 MG tablet, Take 1 tablet by mouth Daily., Disp: 90 tablet, Rfl: 3  •  SPRINTEC 28 0.25-35 MG-MCG per tablet, TAKE 1 TABLET BY MOUTH EVERY DAY SKIP PLACEBO, Disp: , Rfl: 5  •  TiZANidine (ZANAFLEX) 6 MG capsule, Take 1 capsule by mouth 3 (Three) Times a Day., Disp: 270 capsule, Rfl: 1  •  valACYclovir (VALTREX) 1000 MG tablet, Take 2 tablets by mouth 2 (Two) Times a Day., Disp: 4 tablet, Rfl: 5  •  clonazePAM (KlonoPIN) 1 MG tablet, Take 1 mg by mouth as needed for anxiety., Disp: , Rfl:   •  FLUoxetine (PROzac) 10 MG capsule, TAKE 1 CAPSULE BY MOUTH EVERY DAY, Disp: 30 capsule, Rfl: 3  •  omeprazole (priLOSEC) 40 MG capsule, Take 1 capsule by mouth Daily., Disp: 90 capsule, Rfl: 1  •  Prenatal Vit-Fe Fumarate-FA (PRENATAL, CLASSIC, VITAMIN) 28-0.8 MG tablet tablet, Take 1 tablet by mouth daily., Disp: , Rfl:   •  vitamin B-12 (CYANOCOBALAMIN) 1000 MCG tablet, Take 1,000 mcg by mouth daily., Disp: , Rfl:      PMFSH  The following portions of the patient's history were reviewed and updated as appropriate: allergies, current medications, past family history, past medical history, past social history, past surgical history and problem list.    Review of Systems   Constitutional: Negative for appetite change, fever and unexpected weight change.   HENT: Negative for ear pain, facial swelling and sore throat.    Eyes: Negative for pain and visual disturbance.   Respiratory: Negative for chest tightness, shortness of breath and wheezing.    Cardiovascular: Negative for chest pain and palpitations.   Gastrointestinal: Negative for  "abdominal pain and blood in stool.   Endocrine: Negative.    Genitourinary: Negative for difficulty urinating and hematuria.   Musculoskeletal: Negative for joint swelling.   Neurological: Negative for tremors, seizures and syncope.   Hematological: Negative for adenopathy.   Psychiatric/Behavioral: Negative.        Objective   /74   Pulse 70   Temp 98.4 °F (36.9 °C)   Resp 16   Ht 165.1 cm (65\")   Wt 79.1 kg (174 lb 6.4 oz)   SpO2 98%   BMI 29.02 kg/m²     Physical Exam   Constitutional: She is oriented to person, place, and time. She appears well-developed and well-nourished. No distress.   HENT:   Head: Normocephalic and atraumatic. Hair is normal.   Right Ear: Hearing, tympanic membrane, external ear and ear canal normal. No drainage. No decreased hearing is noted.   Left Ear: Hearing, tympanic membrane, external ear and ear canal normal. No decreased hearing is noted.   Nose: No nasal deformity.   Mouth/Throat: Oropharynx is clear and moist.   Eyes: Pupils are equal, round, and reactive to light. Conjunctivae, EOM and lids are normal. Lids are everted and swept, no foreign bodies found. Right eye exhibits no discharge. Left eye exhibits no discharge.   Fundoscopic exam:       The right eye shows red reflex.        The left eye shows red reflex.   Neck: Normal range of motion. Neck supple. No JVD present. No tracheal deviation present. No thyromegaly present.   Cardiovascular: Normal rate, regular rhythm, normal heart sounds, intact distal pulses and normal pulses. Exam reveals no gallop and no friction rub.   No murmur heard.  Pulmonary/Chest: Effort normal and breath sounds normal. No respiratory distress. She has no wheezes. She has no rales. She exhibits no tenderness.   Abdominal: Soft. Bowel sounds are normal. She exhibits no distension and no mass. There is no tenderness. There is no rebound and no guarding. No hernia.   Musculoskeletal: Normal range of motion. She exhibits no edema, " tenderness or deformity.   Lymphadenopathy:     She has no cervical adenopathy.        Right: No inguinal adenopathy present.        Left: No inguinal adenopathy present.   Neurological: She is alert and oriented to person, place, and time. She has normal reflexes. She displays normal reflexes. No cranial nerve deficit. She exhibits normal muscle tone. Coordination normal.   Skin: Skin is warm and dry. No rash noted. She is not diaphoretic. No erythema.   Psychiatric: She has a normal mood and affect. Her behavior is normal. Judgment and thought content normal.   Nursing note and vitals reviewed.      ASSESSMENT/PLAN    Problem List Items Addressed This Visit        Other    Health care maintenance - Primary     Immunizations: Tdap today; declines hepatitis A; all others utd  Eye exam: done 2019  Pap Smear: done 1/2019  Mammogram: done 7/2019, she will schedule; discussed possibility of breast MRI- will see Mammography recs  Dexa: due post menopausal  Colonoscopy: done 2018, Dr Winter; repeat 2023  Labs: fasting labs ordered    Counseled patient regarding multimodal approach with healthy nutrition, healthy sleep, regular physical activity, social activities, counseling, safety measures and medications.                  Relevant Orders    Comprehensive Metabolic Panel (Completed)    Lipid Panel (Completed)    CBC & Differential (Completed)    TSH (Completed)    Iron Profile (Completed)    Ferritin (Completed)      Other Visit Diagnoses     Mean red blood cell volume increased        Relevant Orders    Folate (Completed)    Family history of cancer        Relevant Orders    Ambulatory Referral to Genetic Counseling/Testing - [facility]    Encounter for hepatitis C screening test for low risk patient        Relevant Orders    Hepatitis C Antibody (Completed)               Return in about 1 year (around 7/2/2021) for Annual with fasting labs.

## 2020-07-03 LAB
ALBUMIN SERPL-MCNC: 4.4 G/DL (ref 3.5–5.2)
ALBUMIN/GLOB SERPL: 1.6 G/DL
ALP SERPL-CCNC: 53 U/L (ref 39–117)
ALT SERPL W P-5'-P-CCNC: 18 U/L (ref 1–33)
ANION GAP SERPL CALCULATED.3IONS-SCNC: 8.3 MMOL/L (ref 5–15)
AST SERPL-CCNC: 19 U/L (ref 1–32)
BILIRUB SERPL-MCNC: 0.6 MG/DL (ref 0.2–1.2)
BUN SERPL-MCNC: 9 MG/DL (ref 6–20)
BUN/CREAT SERPL: 10.7 (ref 7–25)
CALCIUM SPEC-SCNC: 9.5 MG/DL (ref 8.6–10.5)
CHLORIDE SERPL-SCNC: 103 MMOL/L (ref 98–107)
CO2 SERPL-SCNC: 27.7 MMOL/L (ref 22–29)
CREAT SERPL-MCNC: 0.84 MG/DL (ref 0.57–1)
GFR SERPL CREATININE-BSD FRML MDRD: 74 ML/MIN/1.73
GLOBULIN UR ELPH-MCNC: 2.7 GM/DL
GLUCOSE SERPL-MCNC: 76 MG/DL (ref 65–99)
POTASSIUM SERPL-SCNC: 4.6 MMOL/L (ref 3.5–5.2)
PROT SERPL-MCNC: 7.1 G/DL (ref 6–8.5)
SODIUM SERPL-SCNC: 139 MMOL/L (ref 136–145)

## 2020-07-03 NOTE — PROGRESS NOTES
Your iron and ferritin levels are now within normal range. Your folate level looks fine as well.    Your cholesterol is so much better that it used to be but remains a little elevated. If you can tolerate it, going up to 10 mg of Crestor would likely get your numbers where we would like them to be. Let me know if you would like to try this.    Everything else looks fine!

## 2020-07-03 NOTE — ASSESSMENT & PLAN NOTE
Immunizations: Tdap today; declines hepatitis A; all others utd  Eye exam: done 2019  Pap Smear: done 1/2019  Mammogram: done 7/2019, she will schedule; discussed possibility of breast MRI- will see Mammography recs  Dexa: due post menopausal  Colonoscopy: done 2018, Dr Winter; repeat 2023  Labs: fasting labs ordered    Counseled patient regarding multimodal approach with healthy nutrition, healthy sleep, regular physical activity, social activities, counseling, safety measures and medications.

## 2020-07-13 ENCOUNTER — TRANSCRIBE ORDERS (OUTPATIENT)
Dept: ADMINISTRATIVE | Facility: HOSPITAL | Age: 45
End: 2020-07-13

## 2020-07-22 ENCOUNTER — TRANSCRIBE ORDERS (OUTPATIENT)
Dept: ADMINISTRATIVE | Facility: HOSPITAL | Age: 45
End: 2020-07-22

## 2020-07-22 DIAGNOSIS — Z12.31 VISIT FOR SCREENING MAMMOGRAM: Primary | ICD-10-CM

## 2020-07-28 ENCOUNTER — TELEPHONE (OUTPATIENT)
Dept: GENETICS | Facility: HOSPITAL | Age: 45
End: 2020-07-28

## 2020-08-08 ENCOUNTER — HOSPITAL ENCOUNTER (OUTPATIENT)
Dept: MAMMOGRAPHY | Facility: HOSPITAL | Age: 45
Discharge: HOME | End: 2020-08-08
Admitting: OBSTETRICS & GYNECOLOGY

## 2020-08-08 DIAGNOSIS — Z12.31 VISIT FOR SCREENING MAMMOGRAM: ICD-10-CM

## 2020-08-08 PROCEDURE — 77067 SCR MAMMO BI INCL CAD: CPT | Performed by: RADIOLOGY

## 2020-08-08 PROCEDURE — 77063 BREAST TOMOSYNTHESIS BI: CPT

## 2020-08-08 PROCEDURE — 77063 BREAST TOMOSYNTHESIS BI: CPT | Performed by: RADIOLOGY

## 2020-08-08 PROCEDURE — 77067 SCR MAMMO BI INCL CAD: CPT

## 2020-08-25 ENCOUNTER — TELEMEDICINE (OUTPATIENT)
Dept: INTERNAL MEDICINE | Facility: CLINIC | Age: 45
End: 2020-08-25

## 2020-08-25 DIAGNOSIS — L30.9 DERMATITIS: Primary | ICD-10-CM

## 2020-08-25 PROCEDURE — 99213 OFFICE O/P EST LOW 20 MIN: CPT | Performed by: PHYSICIAN ASSISTANT

## 2020-08-25 NOTE — PROGRESS NOTES
Chief Complaint   Patient presents with   • Rash       Subjective   Rona Thomson is a 45 y.o. female.       History of Present Illness     This was an audio and video enabled telemedicine encounter.    Pt has been outside quite a bit this summer. Often gets chigger and mosquito bites. Over the past week she has developed extreme itching on her bilateral forearms and elbows. Has kept her up at night. Thinks there is a correlation to sun exposure. Seemed to get better and then she went out with short sleeves.        Current Outpatient Medications:   •  acyclovir (ZOVIRAX) 5 % ointment, Apply  topically Every 4 (Four) Hours While Awake., Disp: 30 g, Rfl: 0  •  betamethasone valerate (VALISONE) 0.1 % cream, Apply  topically to the appropriate area as directed 2 (Two) Times a Day., Disp: 45 g, Rfl: 0  •  cetirizine (ZyrTEC) 10 MG tablet, Take 10 mg by mouth daily., Disp: , Rfl:   •  cholecalciferol (VITAMIN D3) 1000 UNITS tablet, Take 1,000 Units by mouth daily., Disp: , Rfl:   •  clonazePAM (KlonoPIN) 1 MG tablet, Take 1 mg by mouth as needed for anxiety., Disp: , Rfl:   •  diclofenac (VOLTAREN) 1 % gel gel, PLEASE SEE ATTACHED FOR DETAILED DIRECTIONS, Disp: 100 g, Rfl: 0  •  estradiol (MINIVELLE, VIVELLE-DOT) 0.1 MG/24HR patch, APPLY 1 TO STOMACH EVERY 3&1/2 DAYS, Disp: , Rfl: 5  •  ferrous sulfate 325 (65 FE) MG tablet, Take 1 tablet by mouth Daily., Disp: , Rfl:   •  FLUoxetine (PROzac) 10 MG capsule, TAKE 1 CAPSULE BY MOUTH EVERY DAY, Disp: 30 capsule, Rfl: 3  •  montelukast (SINGULAIR) 10 MG tablet, Take 1 tablet by mouth Daily., Disp: 90 tablet, Rfl: 1  •  omeprazole (priLOSEC) 40 MG capsule, Take 1 capsule by mouth Daily., Disp: 90 capsule, Rfl: 1  •  Prenatal Vit-Fe Fumarate-FA (PRENATAL, CLASSIC, VITAMIN) 28-0.8 MG tablet tablet, Take 1 tablet by mouth daily., Disp: , Rfl:   •  rosuvastatin (CRESTOR) 5 MG tablet, Take 1 tablet by mouth Daily., Disp: 90 tablet, Rfl: 3  •  SPRINTEC 28 0.25-35 MG-MCG per  tablet, TAKE 1 TABLET BY MOUTH EVERY DAY SKIP PLACEBO, Disp: , Rfl: 5  •  TiZANidine (ZANAFLEX) 6 MG capsule, Take 1 capsule by mouth 3 (Three) Times a Day., Disp: 270 capsule, Rfl: 1  •  valACYclovir (VALTREX) 1000 MG tablet, Take 2 tablets by mouth 2 (Two) Times a Day., Disp: 4 tablet, Rfl: 5  •  vitamin B-12 (CYANOCOBALAMIN) 1000 MCG tablet, Take 1,000 mcg by mouth daily., Disp: , Rfl:      PMFSH  The following portions of the patient's history were reviewed and updated as appropriate: allergies, current medications, past family history, past medical history, past social history, past surgical history and problem list.    Review of Systems   Constitutional: Negative for activity change, appetite change, fatigue, fever and unexpected weight change.   HENT: Negative for facial swelling, mouth sores and trouble swallowing.    Eyes: Negative for pain, discharge, itching and visual disturbance.   Respiratory: Negative for chest tightness, shortness of breath and wheezing.    Cardiovascular: Negative for chest pain and palpitations.   Gastrointestinal: Negative for abdominal pain, diarrhea, nausea and vomiting.   Endocrine: Negative.    Genitourinary: Negative.    Musculoskeletal: Negative for joint swelling.   Skin: Positive for rash.   Allergic/Immunologic: Negative.    Neurological: Negative for dizziness, seizures, syncope and headaches.   Hematological: Does not bruise/bleed easily.   Psychiatric/Behavioral: Negative.        Objective   There were no vitals taken for this visit.    Physical Exam   Constitutional: She is oriented to person, place, and time. She appears well-developed and well-nourished.   HENT:   Head: Normocephalic and atraumatic.   Right Ear: External ear normal.   Left Ear: External ear normal.   Nose: Nose normal.   Eyes: Conjunctivae and EOM are normal.   Neck: Normal range of motion.   Cardiovascular: Normal rate.   Pulmonary/Chest: Effort normal.   Musculoskeletal: Normal range of motion.    Neurological: She is alert and oriented to person, place, and time.   Skin:   Bilateral forearms and elbows- scattered erythematous papules with excoriations and ulcerated areas from scratching   Psychiatric: She has a normal mood and affect. Her behavior is normal. Judgment and thought content normal.            ASSESSMENT/PLAN    Problem List Items Addressed This Visit     None      Visit Diagnoses     Dermatitis    -  Primary    Use betamethasone cream as directed. Continue prn benadryl qhs. Call if worsening or no better.    Relevant Medications    betamethasone valerate (VALISONE) 0.1 % cream               Return if symptoms worsen or fail to improve.

## 2020-08-27 ENCOUNTER — PRIOR AUTHORIZATION (OUTPATIENT)
Dept: INTERNAL MEDICINE | Facility: CLINIC | Age: 45
End: 2020-08-27

## 2020-08-27 DIAGNOSIS — L30.9 DERMATITIS: ICD-10-CM

## 2020-08-27 NOTE — TELEPHONE ENCOUNTER
Script was already sent in, insurance did not approve it, a PA has been done and it is approved, pharmacy was notified

## 2020-09-21 ENCOUNTER — TELEPHONE (OUTPATIENT)
Dept: GENETICS | Facility: HOSPITAL | Age: 45
End: 2020-09-21

## 2020-09-29 ENCOUNTER — TELEPHONE (OUTPATIENT)
Dept: GENETICS | Facility: HOSPITAL | Age: 45
End: 2020-09-29

## 2020-09-29 NOTE — TELEPHONE ENCOUNTER
Received results from Rona for previous Genetics testing from [facility].  Rivka Duran reviewed results and no further testing is needed at this time. Left voicemail for patient letting her know update. Rivka Duran sent an in-basket progress note to Esthela Gilmore letting her know as well.

## 2021-03-17 ENCOUNTER — IMMUNIZATION (OUTPATIENT)
Dept: VACCINE CLINIC | Facility: HOSPITAL | Age: 46
End: 2021-03-17

## 2021-03-17 PROCEDURE — 91300 HC SARSCOV02 VAC 30MCG/0.3ML IM: CPT | Performed by: INTERNAL MEDICINE

## 2021-03-17 PROCEDURE — 0001A: CPT | Performed by: INTERNAL MEDICINE

## 2021-04-07 ENCOUNTER — IMMUNIZATION (OUTPATIENT)
Dept: VACCINE CLINIC | Facility: HOSPITAL | Age: 46
End: 2021-04-07

## 2021-04-07 PROCEDURE — 91300 HC SARSCOV02 VAC 30MCG/0.3ML IM: CPT | Performed by: INTERNAL MEDICINE

## 2021-04-07 PROCEDURE — 0002A: CPT | Performed by: INTERNAL MEDICINE

## 2021-07-09 ENCOUNTER — OFFICE VISIT (OUTPATIENT)
Dept: INTERNAL MEDICINE | Facility: CLINIC | Age: 46
End: 2021-07-09

## 2021-07-09 ENCOUNTER — LAB (OUTPATIENT)
Dept: LAB | Facility: HOSPITAL | Age: 46
End: 2021-07-09

## 2021-07-09 VITALS
OXYGEN SATURATION: 98 % | HEART RATE: 76 BPM | BODY MASS INDEX: 29.32 KG/M2 | SYSTOLIC BLOOD PRESSURE: 110 MMHG | WEIGHT: 176 LBS | DIASTOLIC BLOOD PRESSURE: 64 MMHG | HEIGHT: 65 IN

## 2021-07-09 DIAGNOSIS — E78.01 FAMILIAL HYPERCHOLESTEROLEMIA: ICD-10-CM

## 2021-07-09 DIAGNOSIS — E55.9 VITAMIN D DEFICIENCY: ICD-10-CM

## 2021-07-09 DIAGNOSIS — Z00.00 HEALTH CARE MAINTENANCE: ICD-10-CM

## 2021-07-09 DIAGNOSIS — Z00.00 HEALTH CARE MAINTENANCE: Primary | ICD-10-CM

## 2021-07-09 LAB
25(OH)D3 SERPL-MCNC: 42.6 NG/ML (ref 30–100)
ALBUMIN SERPL-MCNC: 4.5 G/DL (ref 3.5–5.2)
ALBUMIN/GLOB SERPL: 1.7 G/DL
ALP SERPL-CCNC: 44 U/L (ref 39–117)
ALT SERPL W P-5'-P-CCNC: 13 U/L (ref 1–33)
ANION GAP SERPL CALCULATED.3IONS-SCNC: 8.3 MMOL/L (ref 5–15)
AST SERPL-CCNC: 18 U/L (ref 1–32)
BASOPHILS # BLD AUTO: 0.09 10*3/MM3 (ref 0–0.2)
BASOPHILS NFR BLD AUTO: 1.4 % (ref 0–1.5)
BILIRUB SERPL-MCNC: 0.5 MG/DL (ref 0–1.2)
BUN SERPL-MCNC: 13 MG/DL (ref 6–20)
BUN/CREAT SERPL: 15.5 (ref 7–25)
CALCIUM SPEC-SCNC: 9.1 MG/DL (ref 8.6–10.5)
CHLORIDE SERPL-SCNC: 104 MMOL/L (ref 98–107)
CHOLEST SERPL-MCNC: 344 MG/DL (ref 0–200)
CO2 SERPL-SCNC: 25.7 MMOL/L (ref 22–29)
CREAT SERPL-MCNC: 0.84 MG/DL (ref 0.57–1)
DEPRECATED RDW RBC AUTO: 45.7 FL (ref 37–54)
EOSINOPHIL # BLD AUTO: 0.16 10*3/MM3 (ref 0–0.4)
EOSINOPHIL NFR BLD AUTO: 2.4 % (ref 0.3–6.2)
ERYTHROCYTE [DISTWIDTH] IN BLOOD BY AUTOMATED COUNT: 12.3 % (ref 12.3–15.4)
GFR SERPL CREATININE-BSD FRML MDRD: 74 ML/MIN/1.73
GLOBULIN UR ELPH-MCNC: 2.7 GM/DL
GLUCOSE SERPL-MCNC: 85 MG/DL (ref 65–99)
HCT VFR BLD AUTO: 46.4 % (ref 34–46.6)
HDLC SERPL-MCNC: 58 MG/DL (ref 40–60)
HGB BLD-MCNC: 15.4 G/DL (ref 12–15.9)
IMM GRANULOCYTES # BLD AUTO: 0.02 10*3/MM3 (ref 0–0.05)
IMM GRANULOCYTES NFR BLD AUTO: 0.3 % (ref 0–0.5)
LDLC SERPL CALC-MCNC: 268 MG/DL (ref 0–100)
LDLC/HDLC SERPL: 4.57 {RATIO}
LYMPHOCYTES # BLD AUTO: 2.14 10*3/MM3 (ref 0.7–3.1)
LYMPHOCYTES NFR BLD AUTO: 32.7 % (ref 19.6–45.3)
MCH RBC QN AUTO: 33.4 PG (ref 26.6–33)
MCHC RBC AUTO-ENTMCNC: 33.2 G/DL (ref 31.5–35.7)
MCV RBC AUTO: 100.7 FL (ref 79–97)
MONOCYTES # BLD AUTO: 0.66 10*3/MM3 (ref 0.1–0.9)
MONOCYTES NFR BLD AUTO: 10.1 % (ref 5–12)
NEUTROPHILS NFR BLD AUTO: 3.48 10*3/MM3 (ref 1.7–7)
NEUTROPHILS NFR BLD AUTO: 53.1 % (ref 42.7–76)
NRBC BLD AUTO-RTO: 0 /100 WBC (ref 0–0.2)
PLATELET # BLD AUTO: 331 10*3/MM3 (ref 140–450)
PMV BLD AUTO: 11.2 FL (ref 6–12)
POTASSIUM SERPL-SCNC: 4.5 MMOL/L (ref 3.5–5.2)
PROT SERPL-MCNC: 7.2 G/DL (ref 6–8.5)
RBC # BLD AUTO: 4.61 10*6/MM3 (ref 3.77–5.28)
SODIUM SERPL-SCNC: 138 MMOL/L (ref 136–145)
TRIGL SERPL-MCNC: 105 MG/DL (ref 0–150)
TSH SERPL DL<=0.05 MIU/L-ACNC: 1.33 UIU/ML (ref 0.27–4.2)
VLDLC SERPL-MCNC: 18 MG/DL (ref 5–40)
WBC # BLD AUTO: 6.55 10*3/MM3 (ref 3.4–10.8)

## 2021-07-09 PROCEDURE — 90471 IMMUNIZATION ADMIN: CPT | Performed by: PHYSICIAN ASSISTANT

## 2021-07-09 PROCEDURE — 90715 TDAP VACCINE 7 YRS/> IM: CPT | Performed by: PHYSICIAN ASSISTANT

## 2021-07-09 PROCEDURE — 84443 ASSAY THYROID STIM HORMONE: CPT

## 2021-07-09 PROCEDURE — 99396 PREV VISIT EST AGE 40-64: CPT | Performed by: PHYSICIAN ASSISTANT

## 2021-07-09 PROCEDURE — 36415 COLL VENOUS BLD VENIPUNCTURE: CPT

## 2021-07-09 PROCEDURE — 82306 VITAMIN D 25 HYDROXY: CPT

## 2021-07-09 PROCEDURE — 80053 COMPREHEN METABOLIC PANEL: CPT

## 2021-07-09 PROCEDURE — 80061 LIPID PANEL: CPT

## 2021-07-09 PROCEDURE — 85025 COMPLETE CBC W/AUTO DIFF WBC: CPT

## 2021-07-09 RX ORDER — SODIUM CHLORIDE 20 MG/ML
SOLUTION OPHTHALMIC
COMMUNITY
Start: 2021-04-23 | End: 2022-06-13

## 2021-07-09 NOTE — PROGRESS NOTES
Immunization  Immunization performed in Left deltoid by Roxana Givens MA. Patient tolerated the procedure well without complications.  07/09/21   Roxana Givens MA

## 2021-07-09 NOTE — ASSESSMENT & PLAN NOTE
Immunizations: Tdap today;  all others utd  Eye exam: done 2021  Pap Smear: done 2020  Mammogram: done 8/2020  Dexa: due post menopausal  Colonoscopy: done 2018, Dr Winter; repeat 2023  Labs: fasting labs ordered    Counseled patient regarding multimodal approach with healthy nutrition, healthy sleep, regular physical activity, social activities, counseling, safety measures and medications.

## 2021-07-09 NOTE — PROGRESS NOTES
"Chief Complaint   Patient presents with   • Annual Exam   • Allergies       Subjective     History of Present Illness    Rona Thomson is a 46 y.o. female.     The patient is being seen for a health maintenance evaluation.  The last health maintenance was 1 year(s) ago.    Social History  Rona  does not smoke cigarettes.   She drinks no alcohol.  She does not use illicit drugs.    General History  Rona  does have regular dental visits.  She does complain of vision problems. Wears glasses. Last eye exam was 2021- [facility]. Diagnosed with corneal erosion, using eye drops and hopeful that it will reverse.  Immunizations are not up to date. The patient needs the following immunizations: TDaP needed    Lifestyle  Rona  consumes in general, a \"healthy\" diet  .  She exercises intermittently.    Reproductive Health  Rona  is premenopausal.  She reports periods are regular every 28-30 days.  She is sexually active. Her contraceptive plan is oral contraceptives (estrogen/progesterone).    Screening  Last pap was 2020 by Dr Miller. History of abnormal pap smear or family history of gyn cancer: none  Last mammogram was  8/2020. Personal or family history of abnormal mammograms or breast cancer: mother with breast cancer at age 62  Last colonoscopy was 2018 by Dr Winter, repeat 2023. Family history of colon cancer: father with colon cancer at age 63  Last DEXA was never.     Pt notes that she has had increased heat intolerance whenever she is on antihistamines. She does not sweat like she needs to while on it. Has stopped it and symptoms are better.               Current Outpatient Medications:   •  cholecalciferol (VITAMIN D3) 1000 UNITS tablet, Take 1,000 Units by mouth daily., Disp: , Rfl:   •  estradiol (MINIVELLE, VIVELLE-DOT) 0.1 MG/24HR patch, APPLY 1 TO STOMACH EVERY 3&1/2 DAYS, Disp: , Rfl: 5  •  ferrous sulfate 325 (65 FE) MG tablet, Take 1 tablet by mouth Daily., Disp: , Rfl:   •  " Kiara 128 2 % ophthalmic solution, LOCATION  RIGHT EYE. ONE DROP 4 TIMES A DAY, Disp: , Rfl:   •  Prenatal Vit-Fe Fumarate-FA (PRENATAL, CLASSIC, VITAMIN) 28-0.8 MG tablet tablet, Take 1 tablet by mouth daily., Disp: , Rfl:   •  SPRINTEC 28 0.25-35 MG-MCG per tablet, TAKE 1 TABLET BY MOUTH EVERY DAY SKIP PLACEBO, Disp: , Rfl: 5  •  TiZANidine (ZANAFLEX) 6 MG capsule, Take 1 capsule by mouth 3 (Three) Times a Day., Disp: 270 capsule, Rfl: 1  •  vitamin B-12 (CYANOCOBALAMIN) 1000 MCG tablet, Take 1,000 mcg by mouth daily., Disp: , Rfl:   •  acyclovir (ZOVIRAX) 5 % ointment, Apply  topically Every 4 (Four) Hours While Awake., Disp: 30 g, Rfl: 0  •  betamethasone valerate (VALISONE) 0.1 % cream, Apply  topically to the appropriate area as directed 2 (Two) Times a Day., Disp: 45 g, Rfl: 0  •  cetirizine (ZyrTEC) 10 MG tablet, Take 10 mg by mouth daily., Disp: , Rfl:   •  diclofenac (VOLTAREN) 1 % gel gel, PLEASE SEE ATTACHED FOR DETAILED DIRECTIONS, Disp: 100 g, Rfl: 0  •  omeprazole (priLOSEC) 40 MG capsule, Take 1 capsule by mouth Daily., Disp: 90 capsule, Rfl: 1  •  rosuvastatin (CRESTOR) 5 MG tablet, Take 1 tablet by mouth Daily., Disp: 90 tablet, Rfl: 3  •  valACYclovir (VALTREX) 1000 MG tablet, Take 2 tablets by mouth 2 (Two) Times a Day., Disp: 4 tablet, Rfl: 5     PMFSH  The following portions of the patient's history were reviewed and updated as appropriate: allergies, current medications, past family history, past medical history, past social history, past surgical history and problem list.    Review of Systems   Constitutional: Negative for appetite change, fever and unexpected weight change.   HENT: Negative for ear pain, facial swelling and sore throat.    Eyes: Negative for pain and visual disturbance.   Respiratory: Negative for chest tightness, shortness of breath and wheezing.    Cardiovascular: Negative for chest pain and palpitations.   Gastrointestinal: Negative for abdominal pain and blood in stool.  "  Endocrine: Negative.    Genitourinary: Negative for difficulty urinating and hematuria.   Musculoskeletal: Negative for joint swelling.   Neurological: Negative for tremors, seizures and syncope.   Hematological: Negative for adenopathy.   Psychiatric/Behavioral: Negative.        Objective   /64   Pulse 76   Ht 165.1 cm (65\")   Wt 79.8 kg (176 lb)   LMP 07/02/2021   SpO2 98%   BMI 29.29 kg/m²     Physical Exam  Vitals and nursing note reviewed.   Constitutional:       General: She is not in acute distress.     Appearance: She is well-developed. She is not diaphoretic.   HENT:      Head: Normocephalic and atraumatic. Hair is normal.      Right Ear: Hearing, tympanic membrane, ear canal and external ear normal. No decreased hearing noted. No drainage.      Left Ear: Hearing, tympanic membrane, ear canal and external ear normal. No decreased hearing noted.      Nose: No nasal deformity.   Eyes:      General: Lids are normal. Lids are everted, no foreign bodies appreciated.         Right eye: No discharge.         Left eye: No discharge.      Conjunctiva/sclera: Conjunctivae normal.      Pupils: Pupils are equal, round, and reactive to light.   Neck:      Thyroid: No thyromegaly.      Vascular: No JVD.      Trachea: No tracheal deviation.   Cardiovascular:      Rate and Rhythm: Normal rate and regular rhythm.      Pulses: Normal pulses.      Heart sounds: Normal heart sounds. No murmur heard.   No friction rub. No gallop.    Pulmonary:      Effort: Pulmonary effort is normal. No respiratory distress.      Breath sounds: Normal breath sounds. No wheezing or rales.   Chest:      Chest wall: No tenderness.   Abdominal:      General: Bowel sounds are normal. There is no distension.      Palpations: Abdomen is soft. There is no mass.      Tenderness: There is no abdominal tenderness. There is no guarding or rebound.      Hernia: No hernia is present.   Musculoskeletal:         General: No tenderness or " deformity. Normal range of motion.      Cervical back: Normal range of motion and neck supple.   Lymphadenopathy:      Cervical: No cervical adenopathy.   Skin:     General: Skin is warm and dry.      Findings: No erythema or rash.   Neurological:      Mental Status: She is alert and oriented to person, place, and time.      Cranial Nerves: No cranial nerve deficit.      Motor: No abnormal muscle tone.      Coordination: Coordination normal.      Deep Tendon Reflexes: Reflexes are normal and symmetric. Reflexes normal.   Psychiatric:         Behavior: Behavior normal.         Thought Content: Thought content normal.         Judgment: Judgment normal.              ASSESSMENT/PLAN    Diagnoses and all orders for this visit:    1. Health care maintenance (Primary)  Assessment & Plan:  Immunizations: Tdap today;  all others utd  Eye exam: done 2021  Pap Smear: done 2020  Mammogram: done 8/2020  Dexa: due post menopausal  Colonoscopy: done 2018, Dr Winter; repeat 2023  Labs: fasting labs ordered    Counseled patient regarding multimodal approach with healthy nutrition, healthy sleep, regular physical activity, social activities, counseling, safety measures and medications.             Orders:  -     CBC & Differential; Future  -     Comprehensive Metabolic Panel; Future  -     Lipid Panel; Future  -     TSH; Future    2. Vitamin D deficiency  -     Vitamin D 25 Hydroxy; Future    3. Familial hypercholesterolemia  -     Lipid Panel; Future    Other orders  -     Tdap Vaccine Greater Than or Equal To 6yo IM           Return in about 1 year (around 7/9/2022) for Annual with fasting labs.

## 2021-07-16 NOTE — PROGRESS NOTES
Your labs show that your cholesterol has gone up to significantly elevated levels again. Please work on reducing the carbohydrates in your diet and restart the Crestor as soon as you can.    Everything else looks great!

## 2021-07-20 ENCOUNTER — TRANSCRIBE ORDERS (OUTPATIENT)
Dept: ADMINISTRATIVE | Facility: HOSPITAL | Age: 46
End: 2021-07-20

## 2021-07-20 DIAGNOSIS — Z12.31 VISIT FOR SCREENING MAMMOGRAM: Primary | ICD-10-CM

## 2021-08-10 ENCOUNTER — HOSPITAL ENCOUNTER (OUTPATIENT)
Dept: MAMMOGRAPHY | Facility: HOSPITAL | Age: 46
Discharge: HOME | End: 2021-08-10
Admitting: OBSTETRICS & GYNECOLOGY

## 2021-08-10 DIAGNOSIS — Z12.31 VISIT FOR SCREENING MAMMOGRAM: ICD-10-CM

## 2021-08-10 PROCEDURE — 77063 BREAST TOMOSYNTHESIS BI: CPT

## 2021-08-10 PROCEDURE — 77067 SCR MAMMO BI INCL CAD: CPT | Performed by: RADIOLOGY

## 2021-08-10 PROCEDURE — 77067 SCR MAMMO BI INCL CAD: CPT

## 2021-08-10 PROCEDURE — 77063 BREAST TOMOSYNTHESIS BI: CPT | Performed by: RADIOLOGY

## 2021-09-28 ENCOUNTER — TELEPHONE (OUTPATIENT)
Dept: MRI IMAGING | Facility: HOSPITAL | Age: 46
End: 2021-09-28

## 2021-09-28 NOTE — TELEPHONE ENCOUNTER
Pt called to schedule her Breast MRI. I explained the 6 month rotation with the [facility] and she is to call back late, Jan or Feb when she starts her cycle.  Order is in my file for Feb 2022.

## 2021-10-18 ENCOUNTER — PATIENT MESSAGE (OUTPATIENT)
Dept: INTERNAL MEDICINE | Facility: CLINIC | Age: 46
End: 2021-10-18

## 2021-10-18 RX ORDER — ACYCLOVIR 50 MG/G
OINTMENT TOPICAL
Qty: 30 G | Refills: 0 | Status: CANCELLED | OUTPATIENT
Start: 2021-10-18

## 2021-10-18 RX ORDER — VALACYCLOVIR HYDROCHLORIDE 1 G/1
2000 TABLET, FILM COATED ORAL 2 TIMES DAILY
Qty: 4 TABLET | Refills: 5 | Status: SHIPPED | OUTPATIENT
Start: 2021-10-18

## 2021-10-18 NOTE — TELEPHONE ENCOUNTER
From: Rona Thomson  To: FRAOOQ Berrios  Sent: 10/18/2021 8:28 AM EDT  Subject: Prescription Question    Good morning. I have woke up to a very painful fever blister. Do you care to send in a prescription of Valtrax to [facility] in [place]?

## 2021-12-29 ENCOUNTER — PATIENT MESSAGE (OUTPATIENT)
Dept: INTERNAL MEDICINE | Facility: CLINIC | Age: 46
End: 2021-12-29

## 2021-12-29 DIAGNOSIS — E78.01 FAMILIAL HYPERCHOLESTEROLEMIA: ICD-10-CM

## 2021-12-29 RX ORDER — ROSUVASTATIN CALCIUM 5 MG/1
5 TABLET, COATED ORAL DAILY
Qty: 90 TABLET | Refills: 3 | Status: SHIPPED | OUTPATIENT
Start: 2021-12-29 | End: 2022-03-09 | Stop reason: SDUPTHER

## 2021-12-29 NOTE — TELEPHONE ENCOUNTER
From: Rona Thomson  To: FAROOQ Berrios  Sent: 12/29/2021 10:51 AM EST  Subject: Refill needed    Do you care to call in my Cholesterol medicine at [facility] in [place]?   Thank you

## 2022-02-04 ENCOUNTER — HOSPITAL ENCOUNTER (OUTPATIENT)
Dept: MRI IMAGING | Facility: HOSPITAL | Age: 47
End: 2022-02-04

## 2022-02-15 ENCOUNTER — APPOINTMENT (OUTPATIENT)
Dept: MRI IMAGING | Facility: HOSPITAL | Age: 47
End: 2022-02-15

## 2022-02-24 ENCOUNTER — HOSPITAL ENCOUNTER (OUTPATIENT)
Dept: MRI IMAGING | Facility: HOSPITAL | Age: 47
Discharge: HOME | End: 2022-02-24
Admitting: OBSTETRICS & GYNECOLOGY

## 2022-02-24 DIAGNOSIS — Z12.39 BREAST CANCER SCREENING, HIGH RISK PATIENT: ICD-10-CM

## 2022-02-24 PROCEDURE — A9577 INJ MULTIHANCE: HCPCS | Performed by: OBSTETRICS & GYNECOLOGY

## 2022-02-24 PROCEDURE — 77049 MRI BREAST C-+ W/CAD BI: CPT

## 2022-02-24 PROCEDURE — 77049 MRI BREAST C-+ W/CAD BI: CPT | Performed by: RADIOLOGY

## 2022-02-24 PROCEDURE — 0 GADOBENATE DIMEGLUMINE 529 MG/ML SOLUTION: Performed by: OBSTETRICS & GYNECOLOGY

## 2022-02-24 RX ADMIN — GADOBENATE DIMEGLUMINE 15 ML: 529 INJECTION, SOLUTION INTRAVENOUS at 15:48

## 2022-03-02 ENCOUNTER — TELEPHONE (OUTPATIENT)
Dept: MRI IMAGING | Facility: HOSPITAL | Age: 47
End: 2022-03-02

## 2022-03-02 NOTE — TELEPHONE ENCOUNTER
Called patient with MRI Breast results. Recommended bilateral 2nd looks scheduled for March 30th at 10:00 at the [address]. Pt not on BT. Pt expressed understanding and was encouraged to call with any further questions or concerns.

## 2022-03-09 ENCOUNTER — TELEMEDICINE (OUTPATIENT)
Dept: INTERNAL MEDICINE | Facility: CLINIC | Age: 47
End: 2022-03-09

## 2022-03-09 DIAGNOSIS — E78.01 FAMILIAL HYPERCHOLESTEROLEMIA: ICD-10-CM

## 2022-03-09 DIAGNOSIS — J01.00 ACUTE NON-RECURRENT MAXILLARY SINUSITIS: Primary | ICD-10-CM

## 2022-03-09 PROCEDURE — 99213 OFFICE O/P EST LOW 20 MIN: CPT | Performed by: PHYSICIAN ASSISTANT

## 2022-03-09 RX ORDER — FLUCONAZOLE 150 MG/1
150 TABLET ORAL
Qty: 2 TABLET | Refills: 0 | Status: SHIPPED | OUTPATIENT
Start: 2022-03-09 | End: 2022-06-13

## 2022-03-09 RX ORDER — AMOXICILLIN AND CLAVULANATE POTASSIUM 875; 125 MG/1; MG/1
1 TABLET, FILM COATED ORAL 2 TIMES DAILY
Qty: 20 TABLET | Refills: 0 | Status: SHIPPED | OUTPATIENT
Start: 2022-03-09 | End: 2022-06-13

## 2022-03-09 RX ORDER — ROSUVASTATIN CALCIUM 10 MG/1
10 TABLET, COATED ORAL DAILY
Qty: 90 TABLET | Refills: 3 | Status: SHIPPED | OUTPATIENT
Start: 2022-03-09

## 2022-03-09 NOTE — PROGRESS NOTES
Chief Complaint   Patient presents with   • NIKOLEI       Subjective   Rona Thomson is a 46 y.o. female.       History of Present Illness     This was an audio and video enabled telemedicine encounter.    Pt stopped her allergy medications because she was having SE. Now that the weather has started changing she has developed sinus pressure and pain.     She has 3 foster kids right now. She has a 10 year old and 14 year old and infant. She has been extremely exhausted.       Current Outpatient Medications:   •  rosuvastatin (CRESTOR) 10 MG tablet, Take 1 tablet by mouth Daily., Disp: 90 tablet, Rfl: 3  •  acyclovir (ZOVIRAX) 5 % ointment, Apply  topically Every 4 (Four) Hours While Awake., Disp: 30 g, Rfl: 0  •  amoxicillin-clavulanate (Augmentin) 875-125 MG per tablet, Take 1 tablet by mouth 2 (Two) Times a Day., Disp: 20 tablet, Rfl: 0  •  betamethasone valerate (VALISONE) 0.1 % cream, Apply  topically to the appropriate area as directed 2 (Two) Times a Day., Disp: 45 g, Rfl: 0  •  cetirizine (ZyrTEC) 10 MG tablet, Take 10 mg by mouth daily., Disp: , Rfl:   •  cholecalciferol (VITAMIN D3) 1000 UNITS tablet, Take 1,000 Units by mouth daily., Disp: , Rfl:   •  diclofenac (VOLTAREN) 1 % gel gel, PLEASE SEE ATTACHED FOR DETAILED DIRECTIONS, Disp: 100 g, Rfl: 0  •  estradiol (MINIVELLE, VIVELLE-DOT) 0.1 MG/24HR patch, APPLY 1 TO STOMACH EVERY 3&1/2 DAYS, Disp: , Rfl: 5  •  ferrous sulfate 325 (65 FE) MG tablet, Take 1 tablet by mouth Daily., Disp: , Rfl:   •  fluconazole (Diflucan) 150 MG tablet, Take 1 tablet by mouth Every 3 (Three) Days., Disp: 2 tablet, Rfl: 0  •  Kiara 128 2 % ophthalmic solution, LOCATION  RIGHT EYE. ONE DROP 4 TIMES A DAY, Disp: , Rfl:   •  omeprazole (priLOSEC) 40 MG capsule, Take 1 capsule by mouth Daily., Disp: 90 capsule, Rfl: 1  •  Prenatal Vit-Fe Fumarate-FA (PRENATAL, CLASSIC, VITAMIN) 28-0.8 MG tablet tablet, Take 1 tablet by mouth daily., Disp: , Rfl:   •  SPRINTEC 28 0.25-35 MG-MCG  per tablet, TAKE 1 TABLET BY MOUTH EVERY DAY SKIP PLACEBO, Disp: , Rfl: 5  •  TiZANidine (ZANAFLEX) 6 MG capsule, Take 1 capsule by mouth 3 (Three) Times a Day., Disp: 270 capsule, Rfl: 1  •  valACYclovir (Valtrex) 1000 MG tablet, Take 2 tablets by mouth 2 (Two) Times a Day., Disp: 4 tablet, Rfl: 5  •  vitamin B-12 (CYANOCOBALAMIN) 1000 MCG tablet, Take 1,000 mcg by mouth daily., Disp: , Rfl:      PMFSH  The following portions of the patient's history were reviewed and updated as appropriate: allergies, current medications, past family history, past medical history, past social history, past surgical history and problem list.    Review of Systems   Constitutional: Positive for fatigue. Negative for activity change and unexpected weight change.   HENT: Positive for congestion, postnasal drip and sore throat. Negative for ear pain.    Eyes: Negative for pain and discharge.   Respiratory: Negative for cough, chest tightness, shortness of breath and wheezing.    Cardiovascular: Negative for chest pain and palpitations.   Gastrointestinal: Negative for abdominal pain, diarrhea and vomiting.   Endocrine: Negative.    Genitourinary: Negative.    Musculoskeletal: Negative for joint swelling.   Skin: Negative for color change, rash and wound.   Allergic/Immunologic: Negative.    Neurological: Negative for dizziness, seizures, syncope and headaches.   Psychiatric/Behavioral: Negative.        Objective   There were no vitals taken for this visit.    Physical Exam  Constitutional:       Appearance: She is well-developed.   HENT:      Head: Normocephalic and atraumatic.      Right Ear: External ear normal.      Left Ear: External ear normal.      Nose: Nose normal.   Eyes:      Conjunctiva/sclera: Conjunctivae normal.   Cardiovascular:      Rate and Rhythm: Normal rate.   Pulmonary:      Effort: Pulmonary effort is normal.   Musculoskeletal:         General: Normal range of motion.      Cervical back: Normal range of motion.    Neurological:      Mental Status: She is alert and oriented to person, place, and time.   Psychiatric:         Behavior: Behavior normal.         Thought Content: Thought content normal.         Judgment: Judgment normal.         ASSESSMENT/PLAN    Diagnoses and all orders for this visit:    1. Acute non-recurrent maxillary sinusitis (Primary)  Comments:  Continue otc symptomatic care, increase rest and fluids. If no improvement, start augmentin as ordered.  Orders:  -     amoxicillin-clavulanate (Augmentin) 875-125 MG per tablet; Take 1 tablet by mouth 2 (Two) Times a Day.  Dispense: 20 tablet; Refill: 0    2. Familial hypercholesterolemia  -     rosuvastatin (CRESTOR) 10 MG tablet; Take 1 tablet by mouth Daily.  Dispense: 90 tablet; Refill: 3    Other orders  -     fluconazole (Diflucan) 150 MG tablet; Take 1 tablet by mouth Every 3 (Three) Days.  Dispense: 2 tablet; Refill: 0             Return if symptoms worsen or fail to improve, for Next scheduled follow up.

## 2022-03-30 ENCOUNTER — HOSPITAL ENCOUNTER (OUTPATIENT)
Dept: MAMMOGRAPHY | Facility: HOSPITAL | Age: 47
Discharge: HOME | End: 2022-03-30

## 2022-03-30 ENCOUNTER — HOSPITAL ENCOUNTER (OUTPATIENT)
Dept: ULTRASOUND IMAGING | Facility: HOSPITAL | Age: 47
Discharge: HOME | End: 2022-03-30

## 2022-03-30 DIAGNOSIS — R93.89 ABNORMAL MRI: ICD-10-CM

## 2022-03-30 DIAGNOSIS — Z12.39 BREAST CANCER SCREENING, HIGH RISK PATIENT: ICD-10-CM

## 2022-03-30 DIAGNOSIS — R92.8 ABNORMAL MAGNETIC RESONANCE IMAGING OF BREAST: ICD-10-CM

## 2022-03-30 PROCEDURE — 76642 ULTRASOUND BREAST LIMITED: CPT

## 2022-03-30 PROCEDURE — 76642 ULTRASOUND BREAST LIMITED: CPT | Performed by: RADIOLOGY

## 2022-03-30 PROCEDURE — 77066 DX MAMMO INCL CAD BI: CPT

## 2022-03-30 PROCEDURE — 77066 DX MAMMO INCL CAD BI: CPT | Performed by: RADIOLOGY

## 2022-03-30 PROCEDURE — 19083 BX BREAST 1ST LESION US IMAG: CPT | Performed by: RADIOLOGY

## 2022-03-30 PROCEDURE — 77062 BREAST TOMOSYNTHESIS BI: CPT | Performed by: RADIOLOGY

## 2022-03-30 PROCEDURE — 88305 TISSUE EXAM BY PATHOLOGIST: CPT | Performed by: OBSTETRICS & GYNECOLOGY

## 2022-03-30 PROCEDURE — 0 LIDOCAINE 1 % SOLUTION: Performed by: RADIOLOGY

## 2022-03-30 PROCEDURE — A4648 IMPLANTABLE TISSUE MARKER: HCPCS

## 2022-03-30 PROCEDURE — G0279 TOMOSYNTHESIS, MAMMO: HCPCS

## 2022-03-30 RX ORDER — LIDOCAINE HYDROCHLORIDE 10 MG/ML
5 INJECTION, SOLUTION INFILTRATION; PERINEURAL ONCE
Status: COMPLETED | OUTPATIENT
Start: 2022-03-30 | End: 2022-03-30

## 2022-03-30 RX ORDER — LIDOCAINE HYDROCHLORIDE AND EPINEPHRINE 10; 10 MG/ML; UG/ML
10 INJECTION, SOLUTION INFILTRATION; PERINEURAL ONCE
Status: COMPLETED | OUTPATIENT
Start: 2022-03-30 | End: 2022-03-30

## 2022-03-30 RX ADMIN — Medication 4 ML: at 12:22

## 2022-03-30 RX ADMIN — LIDOCAINE HYDROCHLORIDE,EPINEPHRINE BITARTRATE 2 ML: 10; .01 INJECTION, SOLUTION INFILTRATION; PERINEURAL at 12:23

## 2022-03-31 ENCOUNTER — TELEPHONE (OUTPATIENT)
Dept: MAMMOGRAPHY | Facility: HOSPITAL | Age: 47
End: 2022-03-31

## 2022-03-31 LAB
CYTO UR: NORMAL
LAB AP CASE REPORT: NORMAL
LAB AP CLINICAL INFORMATION: NORMAL
LAB AP DIAGNOSIS COMMENT: NORMAL
PATH REPORT.FINAL DX SPEC: NORMAL
PATH REPORT.GROSS SPEC: NORMAL

## 2022-03-31 NOTE — TELEPHONE ENCOUNTER
Patient notified of pathology results and recommendation. Verbalizes understanding. Denies discomfort.  Denies signs and symptoms of infection. Questions answered, verbalized understanding. Patient is aware she will be notified with an appointment by RT Miriam.

## 2022-04-18 ENCOUNTER — HOSPITAL ENCOUNTER (OUTPATIENT)
Dept: MRI IMAGING | Facility: HOSPITAL | Age: 47
Discharge: HOME | End: 2022-04-18

## 2022-04-18 ENCOUNTER — APPOINTMENT (OUTPATIENT)
Dept: MAMMOGRAPHY | Facility: HOSPITAL | Age: 47
End: 2022-04-18

## 2022-04-18 DIAGNOSIS — R92.8 ABNORMAL MAGNETIC RESONANCE IMAGING OF BREAST: ICD-10-CM

## 2022-04-18 DIAGNOSIS — Z12.39 BREAST CANCER SCREENING, HIGH RISK PATIENT: ICD-10-CM

## 2022-05-05 ENCOUNTER — PATIENT MESSAGE (OUTPATIENT)
Dept: INTERNAL MEDICINE | Facility: CLINIC | Age: 47
End: 2022-05-05

## 2022-05-06 RX ORDER — AZITHROMYCIN 250 MG/1
TABLET, FILM COATED ORAL
Qty: 6 TABLET | Refills: 0 | Status: SHIPPED | OUTPATIENT
Start: 2022-05-06 | End: 2022-06-13

## 2022-07-12 ENCOUNTER — TELEPHONE (OUTPATIENT)
Dept: INTERNAL MEDICINE | Facility: CLINIC | Age: 47
End: 2022-07-12

## 2022-07-12 ENCOUNTER — OFFICE VISIT (OUTPATIENT)
Dept: INTERNAL MEDICINE | Facility: CLINIC | Age: 47
End: 2022-07-12

## 2022-07-12 ENCOUNTER — LAB (OUTPATIENT)
Dept: LAB | Facility: HOSPITAL | Age: 47
End: 2022-07-12

## 2022-07-12 VITALS
DIASTOLIC BLOOD PRESSURE: 70 MMHG | WEIGHT: 184.8 LBS | SYSTOLIC BLOOD PRESSURE: 112 MMHG | HEART RATE: 75 BPM | BODY MASS INDEX: 30.79 KG/M2 | HEIGHT: 65 IN | OXYGEN SATURATION: 98 %

## 2022-07-12 DIAGNOSIS — Z00.00 HEALTH CARE MAINTENANCE: Primary | ICD-10-CM

## 2022-07-12 DIAGNOSIS — M54.50 CHRONIC LOW BACK PAIN, UNSPECIFIED BACK PAIN LATERALITY, UNSPECIFIED WHETHER SCIATICA PRESENT: ICD-10-CM

## 2022-07-12 DIAGNOSIS — Z00.00 HEALTH CARE MAINTENANCE: ICD-10-CM

## 2022-07-12 DIAGNOSIS — J45.20 MILD INTERMITTENT REACTIVE AIRWAY DISEASE WITHOUT COMPLICATION: ICD-10-CM

## 2022-07-12 DIAGNOSIS — E61.1 IRON DEFICIENCY: ICD-10-CM

## 2022-07-12 DIAGNOSIS — E55.9 VITAMIN D DEFICIENCY: ICD-10-CM

## 2022-07-12 DIAGNOSIS — G89.29 CHRONIC LOW BACK PAIN, UNSPECIFIED BACK PAIN LATERALITY, UNSPECIFIED WHETHER SCIATICA PRESENT: ICD-10-CM

## 2022-07-12 DIAGNOSIS — M54.6 ACUTE BILATERAL THORACIC BACK PAIN: ICD-10-CM

## 2022-07-12 LAB
25(OH)D3 SERPL-MCNC: 47.2 NG/ML (ref 30–100)
ALBUMIN SERPL-MCNC: 4.3 G/DL (ref 3.5–5.2)
ALBUMIN/GLOB SERPL: 1.6 G/DL
ALP SERPL-CCNC: 62 U/L (ref 39–117)
ALT SERPL W P-5'-P-CCNC: 14 U/L (ref 1–33)
ANION GAP SERPL CALCULATED.3IONS-SCNC: 10.3 MMOL/L (ref 5–15)
AST SERPL-CCNC: 15 U/L (ref 1–32)
BASOPHILS # BLD AUTO: 0.06 10*3/MM3 (ref 0–0.2)
BASOPHILS NFR BLD AUTO: 0.7 % (ref 0–1.5)
BILIRUB SERPL-MCNC: 0.5 MG/DL (ref 0–1.2)
BUN SERPL-MCNC: 13 MG/DL (ref 6–20)
BUN/CREAT SERPL: 18.1 (ref 7–25)
CALCIUM SPEC-SCNC: 9.2 MG/DL (ref 8.6–10.5)
CHLORIDE SERPL-SCNC: 104 MMOL/L (ref 98–107)
CHOLEST SERPL-MCNC: 239 MG/DL (ref 0–200)
CHROMATIN AB SERPL-ACNC: <10 IU/ML (ref 0–14)
CO2 SERPL-SCNC: 25.7 MMOL/L (ref 22–29)
CREAT SERPL-MCNC: 0.72 MG/DL (ref 0.57–1)
CRP SERPL-MCNC: 0.43 MG/DL (ref 0–0.5)
DEPRECATED RDW RBC AUTO: 39.7 FL (ref 37–54)
EGFRCR SERPLBLD CKD-EPI 2021: 105.2 ML/MIN/1.73
EOSINOPHIL # BLD AUTO: 0.14 10*3/MM3 (ref 0–0.4)
EOSINOPHIL NFR BLD AUTO: 1.6 % (ref 0.3–6.2)
ERYTHROCYTE [DISTWIDTH] IN BLOOD BY AUTOMATED COUNT: 11.7 % (ref 12.3–15.4)
GLOBULIN UR ELPH-MCNC: 2.7 GM/DL
GLUCOSE SERPL-MCNC: 81 MG/DL (ref 65–99)
HBA1C MFR BLD: 5.6 % (ref 4.8–5.6)
HCT VFR BLD AUTO: 41.8 % (ref 34–46.6)
HDLC SERPL-MCNC: 49 MG/DL (ref 40–60)
HGB BLD-MCNC: 14.7 G/DL (ref 12–15.9)
IMM GRANULOCYTES # BLD AUTO: 0.05 10*3/MM3 (ref 0–0.05)
IMM GRANULOCYTES NFR BLD AUTO: 0.6 % (ref 0–0.5)
IRON 24H UR-MRATE: 117 MCG/DL (ref 37–145)
IRON SATN MFR SERPL: 28 % (ref 20–50)
LDLC SERPL CALC-MCNC: 167 MG/DL (ref 0–100)
LDLC/HDLC SERPL: 3.35 {RATIO}
LYMPHOCYTES # BLD AUTO: 2.05 10*3/MM3 (ref 0.7–3.1)
LYMPHOCYTES NFR BLD AUTO: 23.3 % (ref 19.6–45.3)
MCH RBC QN AUTO: 32.7 PG (ref 26.6–33)
MCHC RBC AUTO-ENTMCNC: 35.2 G/DL (ref 31.5–35.7)
MCV RBC AUTO: 93.1 FL (ref 79–97)
MONOCYTES # BLD AUTO: 0.71 10*3/MM3 (ref 0.1–0.9)
MONOCYTES NFR BLD AUTO: 8.1 % (ref 5–12)
NEUTROPHILS NFR BLD AUTO: 5.79 10*3/MM3 (ref 1.7–7)
NEUTROPHILS NFR BLD AUTO: 65.7 % (ref 42.7–76)
NRBC BLD AUTO-RTO: 0 /100 WBC (ref 0–0.2)
PLATELET # BLD AUTO: 331 10*3/MM3 (ref 140–450)
PMV BLD AUTO: 10.8 FL (ref 6–12)
POTASSIUM SERPL-SCNC: 4.3 MMOL/L (ref 3.5–5.2)
PROT SERPL-MCNC: 7 G/DL (ref 6–8.5)
RBC # BLD AUTO: 4.49 10*6/MM3 (ref 3.77–5.28)
SODIUM SERPL-SCNC: 140 MMOL/L (ref 136–145)
TIBC SERPL-MCNC: 417 MCG/DL (ref 298–536)
TRANSFERRIN SERPL-MCNC: 280 MG/DL (ref 200–360)
TRIGL SERPL-MCNC: 129 MG/DL (ref 0–150)
TSH SERPL DL<=0.05 MIU/L-ACNC: 1.08 UIU/ML (ref 0.27–4.2)
VLDLC SERPL-MCNC: 23 MG/DL (ref 5–40)
WBC NRBC COR # BLD: 8.8 10*3/MM3 (ref 3.4–10.8)

## 2022-07-12 PROCEDURE — 82306 VITAMIN D 25 HYDROXY: CPT

## 2022-07-12 PROCEDURE — 80061 LIPID PANEL: CPT

## 2022-07-12 PROCEDURE — 85652 RBC SED RATE AUTOMATED: CPT | Performed by: PHYSICIAN ASSISTANT

## 2022-07-12 PROCEDURE — 83036 HEMOGLOBIN GLYCOSYLATED A1C: CPT

## 2022-07-12 PROCEDURE — 86140 C-REACTIVE PROTEIN: CPT

## 2022-07-12 PROCEDURE — 80050 GENERAL HEALTH PANEL: CPT

## 2022-07-12 PROCEDURE — 84466 ASSAY OF TRANSFERRIN: CPT

## 2022-07-12 PROCEDURE — 99214 OFFICE O/P EST MOD 30 MIN: CPT | Performed by: PHYSICIAN ASSISTANT

## 2022-07-12 PROCEDURE — 83540 ASSAY OF IRON: CPT

## 2022-07-12 PROCEDURE — 99396 PREV VISIT EST AGE 40-64: CPT | Performed by: PHYSICIAN ASSISTANT

## 2022-07-12 PROCEDURE — 86431 RHEUMATOID FACTOR QUANT: CPT

## 2022-07-12 RX ORDER — PANTOPRAZOLE SODIUM 40 MG/1
40 TABLET, DELAYED RELEASE ORAL DAILY
COMMUNITY
Start: 2022-06-09

## 2022-07-12 NOTE — TELEPHONE ENCOUNTER
Vik faxed request for drug change on Fluticasone, the preferred alternative is Symbicortaer, Advair Diskuaer, Breo Ellipta, Advair HFA

## 2022-07-12 NOTE — PROGRESS NOTES
"Chief Complaint   Patient presents with   • Annual Exam   • Anxiety       Subjective     History of Present Illness    Rona Thomson is a 47 y.o. female.     The patient is being seen for a health maintenance evaluation.  The last health maintenance was 1 year(s) ago.    Social History  Rona  does not smoke cigarettes.   She drinks no alcohol.  She does not use illicit drugs.    General History  Rona  does have regular dental visits.  She does complain of vision problems. Last eye exam was 12/2021.  Immunizations are up to date. The patient needs the following immunizations: none    Lifestyle  Rona  consumes in general, an \"unhealthy\" diet.  She exercises intermittently.    Reproductive Health  Rona  is premenopausal.  She reports periods are regular with OCP.  She is sexually active. Her contraceptive plan is no method.    Screening  Last pap was 2021 by Dr. Miller. History of abnormal pap smear or family history of gyn cancer: none  Last mammogram was  3/2022, had follow up biopsy, gets mammogram and MRI alternating. Personal or family history of abnormal mammograms or breast cancer: mother with breast cancer at age 62  Last colonoscopy was 2918 by Dr. Winter, repeat 2023. Family history of colon cancer: father with colon caner at age 63  Last DEXA was never.     Pt has had ongoing cough for months. She was seen at Urgent Care and had chest xray was normal. She has pain in her posterior ribs. Hurts across her mid back. She has had 2 rounds of oral steroids and 3 rounds of antibiotics and an injection. Her cough is better, less frequent than it was. Has her albuterol that she uses prn. Has 9 month old foster child at home who is in . She recognizes that immune system is compromised because she is \"germaphobe\", does not get adequate sleep and chronic stress d/t fostering.                Current Outpatient Medications:   •  acyclovir (ZOVIRAX) 5 % ointment, Apply  topically Every 4 (Four) " Hours While Awake., Disp: 30 g, Rfl: 0  •  albuterol sulfate  (90 Base) MCG/ACT inhaler, INHALE 2 PUFFS BY MOUTH THREE TIMES DAILY FOR 10 DAYS AS NEEDED, Disp: , Rfl:   •  betamethasone valerate (VALISONE) 0.1 % cream, Apply  topically to the appropriate area as directed 2 (Two) Times a Day., Disp: 45 g, Rfl: 0  •  cetirizine (ZyrTEC) 10 MG tablet, Take 10 mg by mouth daily., Disp: , Rfl:   •  cholecalciferol (VITAMIN D3) 1000 UNITS tablet, Take 1,000 Units by mouth daily., Disp: , Rfl:   •  diclofenac (VOLTAREN) 1 % gel gel, PLEASE SEE ATTACHED FOR DETAILED DIRECTIONS, Disp: 100 g, Rfl: 0  •  estradiol (MINIVELLE, VIVELLE-DOT) 0.1 MG/24HR patch, APPLY 1 TO STOMACH EVERY 3&1/2 DAYS, Disp: , Rfl: 5  •  ferrous sulfate 325 (65 FE) MG tablet, Take 1 tablet by mouth Daily., Disp: , Rfl:   •  fluticasone (FLONASE) 50 MCG/ACT nasal spray, 2 sprays by Each Nare route Daily. Shake liquid, Disp: , Rfl:   •  Mucus Relief 600 MG 12 hr tablet, Take 600 mg by mouth Every 12 (Twelve) Hours As Needed., Disp: , Rfl:   •  norgestimate-ethinyl estradiol (ORTHO-CYCLEN) 0.25-35 MG-MCG per tablet, norgestimate 0.25 mg-ethinyl estradiol 35 mcg tablet  TAKE 1 TABLET BY MOUTH EVERY DAY SKIP PLACEBO PILLS, Disp: , Rfl:   •  Prenatal Vit-Fe Fumarate-FA (PRENATAL, CLASSIC, VITAMIN) 28-0.8 MG tablet tablet, Take 1 tablet by mouth daily., Disp: , Rfl:   •  rosuvastatin (CRESTOR) 10 MG tablet, Take 1 tablet by mouth Daily., Disp: 90 tablet, Rfl: 3  •  TiZANidine (ZANAFLEX) 6 MG capsule, Take 1 capsule by mouth 3 (Three) Times a Day., Disp: 270 capsule, Rfl: 1  •  valACYclovir (Valtrex) 1000 MG tablet, Take 2 tablets by mouth 2 (Two) Times a Day., Disp: 4 tablet, Rfl: 5  •  vitamin B-12 (CYANOCOBALAMIN) 1000 MCG tablet, Take 1,000 mcg by mouth daily., Disp: , Rfl:   •  Fluticasone Furoate-Vilanterol (Breo Ellipta) 100-25 MCG/INH inhaler, Inhale 1 puff Daily., Disp: 60 each, Rfl: 1  •  pantoprazole (PROTONIX) 40 MG EC tablet, Take 40 mg by  "mouth Daily., Disp: , Rfl:      PMFSH  The following portions of the patient's history were reviewed and updated as appropriate: allergies, current medications, past family history, past medical history, past social history, past surgical history and problem list.    Review of Systems   Constitutional: Negative for activity change, appetite change and fatigue.   HENT: Negative for congestion and rhinorrhea.    Respiratory: Negative for chest tightness and shortness of breath.    Cardiovascular: Negative for chest pain and palpitations.   Gastrointestinal: Negative for abdominal pain.   Genitourinary: Negative for dysuria.   Musculoskeletal: Positive for back pain. Negative for arthralgias and myalgias.   Neurological: Negative for dizziness, weakness, light-headedness and headaches.   Psychiatric/Behavioral: Negative for dysphoric mood. The patient is not nervous/anxious.        Objective   /70   Pulse 75   Ht 165.1 cm (65\")   Wt 83.8 kg (184 lb 12.8 oz)   SpO2 98%   BMI 30.75 kg/m²     Physical Exam  Vitals and nursing note reviewed.   Constitutional:       Appearance: She is well-developed.   HENT:      Head: Normocephalic.      Right Ear: Hearing, tympanic membrane, ear canal and external ear normal.      Left Ear: Hearing, tympanic membrane, ear canal and external ear normal.      Nose: Nose normal.   Eyes:      Conjunctiva/sclera: Conjunctivae normal.      Pupils: Pupils are equal, round, and reactive to light.   Cardiovascular:      Rate and Rhythm: Normal rate and regular rhythm.      Heart sounds: Normal heart sounds.   Pulmonary:      Effort: Pulmonary effort is normal.      Breath sounds: Normal breath sounds. No decreased breath sounds, wheezing, rhonchi or rales.   Musculoskeletal:         General: Normal range of motion.      Cervical back: Normal range of motion.   Skin:     General: Skin is warm and dry.   Neurological:      Mental Status: She is alert.   Psychiatric:         Behavior: " Behavior normal.              ASSESSMENT/PLAN    Diagnoses and all orders for this visit:    1. Health care maintenance (Primary)  Assessment & Plan:  Immunizations: utd  Eye exam: done 2021  Pap Smear: done 2021 by Dr. Miller  Mammogram: done 3/2022, breast MRI in 9/2022  Dexa: due post menopausal  Colonoscopy: done 2018, Dr Winter; repeat 2023  Labs: fasting labs ordered    Counseled patient regarding multimodal approach with healthy nutrition, healthy sleep, regular physical activity, social activities, counseling, safety measures and medications.             Orders:  -     CBC & Differential; Future  -     Comprehensive Metabolic Panel; Future  -     Lipid Panel; Future  -     TSH; Future  -     Hemoglobin A1c; Future    2. Vitamin D deficiency  -     Vitamin D 25 Hydroxy; Future    3. Chronic low back pain, unspecified back pain laterality, unspecified whether sciatica present  -     C-reactive Protein; Future  -     Rheumatoid Factor; Future  -     Sedimentation Rate    4. Iron deficiency  -     Iron Profile; Future    5. Mild intermittent reactive airway disease without complication  Comments:  Start Breo inhaler to help resolve underlying reactive airway  Orders:  -     Fluticasone Furoate-Vilanterol (Breo Ellipta) 100-25 MCG/INH inhaler; Inhale 1 puff Daily.  Dispense: 60 each; Refill: 1    6. Acute bilateral thoracic back pain  Comments:  Start Breo inhaler to help with ongoing cough. Do stretches of back . If no improvement, discussed referral to PT.           Return in about 1 year (around 7/12/2023) for Annual with fasting labs.

## 2022-07-13 LAB — ERYTHROCYTE [SEDIMENTATION RATE] IN BLOOD: 20 MM/HR (ref 0–20)

## 2022-07-13 NOTE — TELEPHONE ENCOUNTER
The prescription I sent was for Breo, which is on the list- also our system says it is on formulary. Please check with pharmacy about this.

## 2022-07-14 NOTE — ASSESSMENT & PLAN NOTE
Addended by: BRITANY BLACK on: 4/29/2019 01:33 PM     Modules accepted: Orders     Immunizations: utd  Eye exam: done 2021  Pap Smear: done 2021 by Dr. Miller  Mammogram: done 3/2022, breast MRI in 9/2022  Dexa: due post menopausal  Colonoscopy: done 2018, Dr Winter; repeat 2023  Labs: fasting labs ordered    Counseled patient regarding multimodal approach with healthy nutrition, healthy sleep, regular physical activity, social activities, counseling, safety measures and medications.

## 2022-07-23 NOTE — PROGRESS NOTES
Your sed rate (inflammatory marker) is on the high end of normal but is still within range. This could indicate underlying inflammation from your ongoing back pain but is not a worrisome level.

## 2022-07-23 NOTE — PROGRESS NOTES
Your labs show that your cholesterol is better but still a little high. Please continue to work on eating a healthy diet that is low in carbohydrates and taking your medication when you can.    Everything else looks fine!

## 2022-08-25 ENCOUNTER — TELEPHONE (OUTPATIENT)
Dept: MRI IMAGING | Facility: HOSPITAL | Age: 47
End: 2022-08-25

## 2022-08-25 NOTE — TELEPHONE ENCOUNTER
Pt called to schedule her 6 month follow up , but in speaking with her, she is going through IVF to freeze her eggs. She will be doing this through the next month and will see Dr. Miller at the end of October. She will push out the MRI for now since her hormone levels will b e elevated. We will touch base after her appt with dr. Miller.

## 2022-10-07 ENCOUNTER — PATIENT MESSAGE (OUTPATIENT)
Dept: INTERNAL MEDICINE | Facility: CLINIC | Age: 47
End: 2022-10-07

## 2022-10-24 ENCOUNTER — PRIOR AUTHORIZATION (OUTPATIENT)
Dept: INTERNAL MEDICINE | Facility: CLINIC | Age: 47
End: 2022-10-24

## 2023-01-11 ENCOUNTER — PRE-ADMISSION TESTING (OUTPATIENT)
Dept: PREADMISSION TESTING | Facility: HOSPITAL | Age: 48
End: 2023-01-11
Payer: COMMERCIAL

## 2023-01-11 ENCOUNTER — PREP FOR SURGERY (OUTPATIENT)
Dept: OTHER | Facility: HOSPITAL | Age: 48
End: 2023-01-11
Payer: COMMERCIAL

## 2023-01-11 VITALS — BODY MASS INDEX: 30.12 KG/M2 | HEIGHT: 65 IN | WEIGHT: 180.78 LBS

## 2023-01-11 DIAGNOSIS — N92.4 EXCESSIVE BLEEDING IN PREMENOPAUSAL PERIOD: Primary | ICD-10-CM

## 2023-01-11 DIAGNOSIS — N92.4 EXCESSIVE BLEEDING IN PREMENOPAUSAL PERIOD: ICD-10-CM

## 2023-01-11 LAB
BACTERIA UR QL AUTO: ABNORMAL /HPF
BASOPHILS # BLD AUTO: 0.08 10*3/MM3 (ref 0–0.2)
BASOPHILS NFR BLD AUTO: 0.9 % (ref 0–1.5)
BILIRUB UR QL STRIP: NEGATIVE
CLARITY UR: CLEAR
COLOR UR: YELLOW
DEPRECATED RDW RBC AUTO: 43.1 FL (ref 37–54)
EOSINOPHIL # BLD AUTO: 0.18 10*3/MM3 (ref 0–0.4)
EOSINOPHIL NFR BLD AUTO: 2.1 % (ref 0.3–6.2)
ERYTHROCYTE [DISTWIDTH] IN BLOOD BY AUTOMATED COUNT: 12.1 % (ref 12.3–15.4)
GLUCOSE UR STRIP-MCNC: NEGATIVE MG/DL
HCT VFR BLD AUTO: 40.6 % (ref 34–46.6)
HGB BLD-MCNC: 13.7 G/DL (ref 12–15.9)
HGB UR QL STRIP.AUTO: ABNORMAL
HYALINE CASTS UR QL AUTO: ABNORMAL /LPF
IMM GRANULOCYTES # BLD AUTO: 0.05 10*3/MM3 (ref 0–0.05)
IMM GRANULOCYTES NFR BLD AUTO: 0.6 % (ref 0–0.5)
KETONES UR QL STRIP: NEGATIVE
LEUKOCYTE ESTERASE UR QL STRIP.AUTO: NEGATIVE
LYMPHOCYTES # BLD AUTO: 2.49 10*3/MM3 (ref 0.7–3.1)
LYMPHOCYTES NFR BLD AUTO: 28.5 % (ref 19.6–45.3)
MCH RBC QN AUTO: 32.6 PG (ref 26.6–33)
MCHC RBC AUTO-ENTMCNC: 33.7 G/DL (ref 31.5–35.7)
MCV RBC AUTO: 96.7 FL (ref 79–97)
MONOCYTES # BLD AUTO: 0.62 10*3/MM3 (ref 0.1–0.9)
MONOCYTES NFR BLD AUTO: 7.1 % (ref 5–12)
NEUTROPHILS NFR BLD AUTO: 5.32 10*3/MM3 (ref 1.7–7)
NEUTROPHILS NFR BLD AUTO: 60.8 % (ref 42.7–76)
NITRITE UR QL STRIP: NEGATIVE
NRBC BLD AUTO-RTO: 0 /100 WBC (ref 0–0.2)
PH UR STRIP.AUTO: 6 [PH] (ref 5–8)
PLATELET # BLD AUTO: 305 10*3/MM3 (ref 140–450)
PMV BLD AUTO: 10.1 FL (ref 6–12)
PROT UR QL STRIP: NEGATIVE
RBC # BLD AUTO: 4.2 10*6/MM3 (ref 3.77–5.28)
RBC # UR STRIP: ABNORMAL /HPF
REF LAB TEST METHOD: ABNORMAL
SP GR UR STRIP: 1.01 (ref 1–1.03)
SQUAMOUS #/AREA URNS HPF: ABNORMAL /HPF
UROBILINOGEN UR QL STRIP: ABNORMAL
WBC # UR STRIP: ABNORMAL /HPF
WBC NRBC COR # BLD: 8.74 10*3/MM3 (ref 3.4–10.8)

## 2023-01-11 PROCEDURE — 85025 COMPLETE CBC W/AUTO DIFF WBC: CPT

## 2023-01-11 PROCEDURE — 81001 URINALYSIS AUTO W/SCOPE: CPT

## 2023-01-11 PROCEDURE — 36415 COLL VENOUS BLD VENIPUNCTURE: CPT

## 2023-01-11 RX ORDER — SODIUM CHLORIDE 0.9 % (FLUSH) 0.9 %
10 SYRINGE (ML) INJECTION AS NEEDED
Status: CANCELLED | OUTPATIENT
Start: 2023-01-11

## 2023-01-11 RX ORDER — CEFDINIR 300 MG/1
CAPSULE ORAL
Status: ON HOLD | COMMUNITY
Start: 2023-01-04 | End: 2023-01-19

## 2023-01-11 RX ORDER — HEPARIN SODIUM 5000 [USP'U]/ML
5000 INJECTION, SOLUTION INTRAVENOUS; SUBCUTANEOUS ONCE
Status: CANCELLED | OUTPATIENT
Start: 2023-01-11 | End: 2023-01-11

## 2023-01-11 RX ORDER — SODIUM CHLORIDE 9 MG/ML
40 INJECTION, SOLUTION INTRAVENOUS AS NEEDED
Status: CANCELLED | OUTPATIENT
Start: 2023-01-11

## 2023-01-11 RX ORDER — ACETAMINOPHEN 500 MG
1000 TABLET ORAL ONCE
Status: CANCELLED | OUTPATIENT
Start: 2023-01-11 | End: 2023-01-11

## 2023-01-11 RX ORDER — MULTIPLE VITAMINS W/ MINERALS TAB 9MG-400MCG
1 TAB ORAL DAILY
COMMUNITY

## 2023-01-11 RX ORDER — GABAPENTIN 300 MG/1
600 CAPSULE ORAL ONCE
Status: CANCELLED | OUTPATIENT
Start: 2023-01-11 | End: 2023-01-11

## 2023-01-11 RX ORDER — SODIUM CHLORIDE 0.9 % (FLUSH) 0.9 %
3 SYRINGE (ML) INJECTION EVERY 12 HOURS SCHEDULED
Status: CANCELLED | OUTPATIENT
Start: 2023-01-11

## 2023-01-11 RX ORDER — AMOXICILLIN 500 MG/1
500 CAPSULE ORAL 2 TIMES DAILY
COMMUNITY
Start: 2022-12-08 | End: 2023-01-11

## 2023-01-11 RX ORDER — CEFAZOLIN SODIUM 2 G/100ML
2 INJECTION, SOLUTION INTRAVENOUS ONCE
Status: CANCELLED | OUTPATIENT
Start: 2023-01-11 | End: 2023-01-11

## 2023-01-11 NOTE — PAT
An arrival time for procedure was not provided during PAT visit. If patient had any questions or concerns about their arrival time, they were instructed to contact their surgeon/physician.  Additionally, if the patient referred to an arrival time that was acquired from their my chart account, patient was encouraged to verify that time with their surgeon/physician. Arrival times are NOT provided in Pre Admission Testing Department.    Patient viewed general PAT education video as instructed in their preoperative information received from their surgeon.  Patient stated the general PAT education video was viewed in its entirety and survey completed.  Copies of PAT general education handouts (Incentive Spirometry, Meds to Beds Program, Patient Belongings, Pre-op skin preparation instructions, Blood Glucose testing, Visitor policy, Surgery FAQ, Code H) distributed to patient if not printed. Education related to the PAT pass and skin preparation for surgery (if applicable) completed in PAT as a reinforcement to PAT education video. Patient instructed to return PAT pass provided today as well as completed skin preparation sheet (if applicable) on the day of procedure.     Additionally if patient had not viewed video yet but intended to view it at home or in our waiting area, then referred them to the handout with QR code/link provided during PAT visit.  Instructed patient to complete survey after viewing the video in its entirety.  Encouraged patient/family to read PAT general education handouts thoroughly and notify PAT staff with any questions or concerns. Patient verbalized understanding of all information and priority content.    Patient denies any current skin issues.     Patient to apply Chlorhexadine wipes  to surgical area (as instructed) the night before procedure and the AM of procedure. Wipes provided.    Instructed patient to take two Tylenol extra strength (total of 1000 mg) the night before surgery.    PT  REPORTED COUGH/CONGESTION THAT STARTED ~1 WEEK AGO. PT COMPLETED ROUND OF AMOXICILLIN LAST WEEK, AND PT TO TAKE LAST DOSE OF CEFDINIR 1/11/23. PT REPORTS NO IMPROVEMENT IN COUGH. REPORTED ABOVE TO PETRA IN DR. CHAVEZ'S OFFICE. NO NEW ORDERS.

## 2023-01-18 ENCOUNTER — ANESTHESIA EVENT (OUTPATIENT)
Dept: PERIOP | Facility: HOSPITAL | Age: 48
End: 2023-01-18
Payer: COMMERCIAL

## 2023-01-18 RX ORDER — SODIUM CHLORIDE 0.9 % (FLUSH) 0.9 %
10 SYRINGE (ML) INJECTION EVERY 12 HOURS SCHEDULED
Status: CANCELLED | OUTPATIENT
Start: 2023-01-18

## 2023-01-18 RX ORDER — SODIUM CHLORIDE 0.9 % (FLUSH) 0.9 %
10 SYRINGE (ML) INJECTION AS NEEDED
Status: CANCELLED | OUTPATIENT
Start: 2023-01-18

## 2023-01-18 RX ORDER — FAMOTIDINE 10 MG/ML
20 INJECTION, SOLUTION INTRAVENOUS ONCE
Status: CANCELLED | OUTPATIENT
Start: 2023-01-18 | End: 2023-01-18

## 2023-01-18 RX ORDER — SODIUM CHLORIDE 9 MG/ML
40 INJECTION, SOLUTION INTRAVENOUS AS NEEDED
Status: CANCELLED | OUTPATIENT
Start: 2023-01-18

## 2023-01-19 ENCOUNTER — HOSPITAL ENCOUNTER (OUTPATIENT)
Facility: HOSPITAL | Age: 48
Discharge: HOME | End: 2023-01-19
Attending: OBSTETRICS & GYNECOLOGY | Admitting: OBSTETRICS & GYNECOLOGY
Payer: COMMERCIAL

## 2023-01-19 ENCOUNTER — ANESTHESIA (OUTPATIENT)
Dept: PERIOP | Facility: HOSPITAL | Age: 48
End: 2023-01-19
Payer: COMMERCIAL

## 2023-01-19 ENCOUNTER — READMISSION MANAGEMENT (OUTPATIENT)
Dept: CALL CENTER | Facility: HOSPITAL | Age: 48
End: 2023-01-19
Payer: COMMERCIAL

## 2023-01-19 VITALS
RESPIRATION RATE: 16 BRPM | OXYGEN SATURATION: 96 % | TEMPERATURE: 98.2 F | WEIGHT: 180 LBS | HEIGHT: 65 IN | DIASTOLIC BLOOD PRESSURE: 79 MMHG | SYSTOLIC BLOOD PRESSURE: 120 MMHG | BODY MASS INDEX: 29.99 KG/M2 | HEART RATE: 77 BPM

## 2023-01-19 DIAGNOSIS — N92.4 MENORRHAGIA, PREMENOPAUSAL: ICD-10-CM

## 2023-01-19 DIAGNOSIS — N92.4 EXCESSIVE BLEEDING IN PREMENOPAUSAL PERIOD: ICD-10-CM

## 2023-01-19 DIAGNOSIS — N80.9 ENDOMETRIOSIS: Primary | ICD-10-CM

## 2023-01-19 LAB
B-HCG UR QL: NEGATIVE
EXPIRATION DATE: NORMAL
INTERNAL NEGATIVE CONTROL: NORMAL
INTERNAL POSITIVE CONTROL: NORMAL
Lab: NORMAL

## 2023-01-19 PROCEDURE — 81025 URINE PREGNANCY TEST: CPT | Performed by: OBSTETRICS & GYNECOLOGY

## 2023-01-19 PROCEDURE — 25010000002 ONDANSETRON PER 1 MG: Performed by: NURSE ANESTHETIST, CERTIFIED REGISTERED

## 2023-01-19 PROCEDURE — G0378 HOSPITAL OBSERVATION PER HR: HCPCS

## 2023-01-19 PROCEDURE — 88307 TISSUE EXAM BY PATHOLOGIST: CPT | Performed by: OBSTETRICS & GYNECOLOGY

## 2023-01-19 PROCEDURE — 25010000002 FENTANYL CITRATE (PF) 100 MCG/2ML SOLUTION: Performed by: NURSE ANESTHETIST, CERTIFIED REGISTERED

## 2023-01-19 PROCEDURE — 25010000002 PROPOFOL 10 MG/ML EMULSION: Performed by: NURSE ANESTHETIST, CERTIFIED REGISTERED

## 2023-01-19 PROCEDURE — 25010000002 MIDAZOLAM PER 1 MG: Performed by: ANESTHESIOLOGY

## 2023-01-19 PROCEDURE — 25010000002 HEPARIN (PORCINE) PER 1000 UNITS: Performed by: OBSTETRICS & GYNECOLOGY

## 2023-01-19 PROCEDURE — 25010000002 KETOROLAC TROMETHAMINE PER 15 MG: Performed by: NURSE ANESTHETIST, CERTIFIED REGISTERED

## 2023-01-19 PROCEDURE — 25010000002 DEXAMETHASONE PER 1 MG: Performed by: NURSE ANESTHETIST, CERTIFIED REGISTERED

## 2023-01-19 PROCEDURE — 25010000002 CEFAZOLIN IN DEXTROSE 2-4 GM/100ML-% SOLUTION: Performed by: OBSTETRICS & GYNECOLOGY

## 2023-01-19 RX ORDER — PROPOFOL 10 MG/ML
VIAL (ML) INTRAVENOUS AS NEEDED
Status: DISCONTINUED | OUTPATIENT
Start: 2023-01-19 | End: 2023-01-19 | Stop reason: SURG

## 2023-01-19 RX ORDER — NALOXONE HCL 0.4 MG/ML
0.4 VIAL (ML) INJECTION AS NEEDED
Status: DISCONTINUED | OUTPATIENT
Start: 2023-01-19 | End: 2023-01-19 | Stop reason: HOSPADM

## 2023-01-19 RX ORDER — BUPIVACAINE HYDROCHLORIDE AND EPINEPHRINE 5; 5 MG/ML; UG/ML
INJECTION, SOLUTION PERINEURAL AS NEEDED
Status: DISCONTINUED | OUTPATIENT
Start: 2023-01-19 | End: 2023-01-19 | Stop reason: HOSPADM

## 2023-01-19 RX ORDER — MIDAZOLAM HYDROCHLORIDE 1 MG/ML
1 INJECTION INTRAMUSCULAR; INTRAVENOUS
Status: DISCONTINUED | OUTPATIENT
Start: 2023-01-19 | End: 2023-01-19 | Stop reason: HOSPADM

## 2023-01-19 RX ORDER — LABETALOL HYDROCHLORIDE 5 MG/ML
5 INJECTION, SOLUTION INTRAVENOUS
Status: DISCONTINUED | OUTPATIENT
Start: 2023-01-19 | End: 2023-01-19 | Stop reason: HOSPADM

## 2023-01-19 RX ORDER — HYDROMORPHONE HYDROCHLORIDE 1 MG/ML
0.5 INJECTION, SOLUTION INTRAMUSCULAR; INTRAVENOUS; SUBCUTANEOUS
Status: DISCONTINUED | OUTPATIENT
Start: 2023-01-19 | End: 2023-01-19 | Stop reason: HOSPADM

## 2023-01-19 RX ORDER — SODIUM CHLORIDE 0.9 % (FLUSH) 0.9 %
3-10 SYRINGE (ML) INJECTION AS NEEDED
Status: DISCONTINUED | OUTPATIENT
Start: 2023-01-19 | End: 2023-01-19 | Stop reason: HOSPADM

## 2023-01-19 RX ORDER — ONDANSETRON 2 MG/ML
INJECTION INTRAMUSCULAR; INTRAVENOUS AS NEEDED
Status: DISCONTINUED | OUTPATIENT
Start: 2023-01-19 | End: 2023-01-19 | Stop reason: SURG

## 2023-01-19 RX ORDER — KETAMINE HCL IN NACL, ISO-OSM 100MG/10ML
SYRINGE (ML) INJECTION AS NEEDED
Status: DISCONTINUED | OUTPATIENT
Start: 2023-01-19 | End: 2023-01-19 | Stop reason: SURG

## 2023-01-19 RX ORDER — LIDOCAINE HYDROCHLORIDE 10 MG/ML
0.5 INJECTION, SOLUTION EPIDURAL; INFILTRATION; INTRACAUDAL; PERINEURAL ONCE AS NEEDED
Status: COMPLETED | OUTPATIENT
Start: 2023-01-19 | End: 2023-01-19

## 2023-01-19 RX ORDER — MEPERIDINE HYDROCHLORIDE 25 MG/ML
12.5 INJECTION INTRAMUSCULAR; INTRAVENOUS; SUBCUTANEOUS
Status: DISCONTINUED | OUTPATIENT
Start: 2023-01-19 | End: 2023-01-19 | Stop reason: HOSPADM

## 2023-01-19 RX ORDER — SODIUM CHLORIDE 0.9 % (FLUSH) 0.9 %
3 SYRINGE (ML) INJECTION EVERY 12 HOURS SCHEDULED
Status: DISCONTINUED | OUTPATIENT
Start: 2023-01-19 | End: 2023-01-19 | Stop reason: HOSPADM

## 2023-01-19 RX ORDER — FENTANYL CITRATE 50 UG/ML
50 INJECTION, SOLUTION INTRAMUSCULAR; INTRAVENOUS
Status: DISCONTINUED | OUTPATIENT
Start: 2023-01-19 | End: 2023-01-19 | Stop reason: HOSPADM

## 2023-01-19 RX ORDER — SODIUM CHLORIDE 9 MG/ML
40 INJECTION, SOLUTION INTRAVENOUS AS NEEDED
Status: DISCONTINUED | OUTPATIENT
Start: 2023-01-19 | End: 2023-01-19 | Stop reason: HOSPADM

## 2023-01-19 RX ORDER — POLYETHYLENE GLYCOL 3350 17 G/17G
17 POWDER, FOR SOLUTION ORAL DAILY
Qty: 238 G | Refills: 0 | Status: SHIPPED | OUTPATIENT
Start: 2023-01-19

## 2023-01-19 RX ORDER — DROPERIDOL 2.5 MG/ML
0.62 INJECTION, SOLUTION INTRAMUSCULAR; INTRAVENOUS
Status: DISCONTINUED | OUTPATIENT
Start: 2023-01-19 | End: 2023-01-19 | Stop reason: HOSPADM

## 2023-01-19 RX ORDER — OXYCODONE HYDROCHLORIDE 5 MG/1
5 TABLET ORAL EVERY 4 HOURS PRN
Qty: 10 TABLET | Refills: 0 | Status: SHIPPED | OUTPATIENT
Start: 2023-01-19

## 2023-01-19 RX ORDER — FAMOTIDINE 20 MG/1
20 TABLET, FILM COATED ORAL ONCE
Status: COMPLETED | OUTPATIENT
Start: 2023-01-19 | End: 2023-01-19

## 2023-01-19 RX ORDER — FENTANYL CITRATE 50 UG/ML
INJECTION, SOLUTION INTRAMUSCULAR; INTRAVENOUS AS NEEDED
Status: DISCONTINUED | OUTPATIENT
Start: 2023-01-19 | End: 2023-01-19 | Stop reason: SURG

## 2023-01-19 RX ORDER — ONDANSETRON 2 MG/ML
4 INJECTION INTRAMUSCULAR; INTRAVENOUS ONCE AS NEEDED
Status: DISCONTINUED | OUTPATIENT
Start: 2023-01-19 | End: 2023-01-19 | Stop reason: HOSPADM

## 2023-01-19 RX ORDER — ENOXAPARIN SODIUM 100 MG/ML
40 INJECTION SUBCUTANEOUS EVERY 12 HOURS SCHEDULED
Qty: 60 ML | Refills: 0 | Status: SHIPPED | OUTPATIENT
Start: 2023-01-19

## 2023-01-19 RX ORDER — GABAPENTIN 300 MG/1
600 CAPSULE ORAL ONCE
Status: COMPLETED | OUTPATIENT
Start: 2023-01-19 | End: 2023-01-19

## 2023-01-19 RX ORDER — HEPARIN SODIUM 5000 [USP'U]/ML
INJECTION, SOLUTION INTRAVENOUS; SUBCUTANEOUS AS NEEDED
Status: DISCONTINUED | OUTPATIENT
Start: 2023-01-19 | End: 2023-01-19 | Stop reason: HOSPADM

## 2023-01-19 RX ORDER — KETOROLAC TROMETHAMINE 30 MG/ML
INJECTION, SOLUTION INTRAMUSCULAR; INTRAVENOUS AS NEEDED
Status: DISCONTINUED | OUTPATIENT
Start: 2023-01-19 | End: 2023-01-19 | Stop reason: SURG

## 2023-01-19 RX ORDER — ACETAMINOPHEN 325 MG/1
650 TABLET ORAL EVERY 4 HOURS PRN
Qty: 100 TABLET | Refills: 0 | Status: SHIPPED | OUTPATIENT
Start: 2023-01-19

## 2023-01-19 RX ORDER — IPRATROPIUM BROMIDE AND ALBUTEROL SULFATE 2.5; .5 MG/3ML; MG/3ML
3 SOLUTION RESPIRATORY (INHALATION) ONCE AS NEEDED
Status: DISCONTINUED | OUTPATIENT
Start: 2023-01-19 | End: 2023-01-19 | Stop reason: HOSPADM

## 2023-01-19 RX ORDER — LIDOCAINE HYDROCHLORIDE 10 MG/ML
INJECTION, SOLUTION EPIDURAL; INFILTRATION; INTRACAUDAL; PERINEURAL AS NEEDED
Status: DISCONTINUED | OUTPATIENT
Start: 2023-01-19 | End: 2023-01-19 | Stop reason: SURG

## 2023-01-19 RX ORDER — HYDROCODONE BITARTRATE AND ACETAMINOPHEN 5; 325 MG/1; MG/1
1 TABLET ORAL ONCE AS NEEDED
Status: DISCONTINUED | OUTPATIENT
Start: 2023-01-19 | End: 2023-01-19 | Stop reason: HOSPADM

## 2023-01-19 RX ORDER — SODIUM CHLORIDE, SODIUM LACTATE, POTASSIUM CHLORIDE, CALCIUM CHLORIDE 600; 310; 30; 20 MG/100ML; MG/100ML; MG/100ML; MG/100ML
9 INJECTION, SOLUTION INTRAVENOUS CONTINUOUS
Status: DISCONTINUED | OUTPATIENT
Start: 2023-01-19 | End: 2023-01-19 | Stop reason: HOSPADM

## 2023-01-19 RX ORDER — ONDANSETRON 2 MG/ML
4 INJECTION INTRAMUSCULAR; INTRAVENOUS ONCE AS NEEDED
Status: DISCONTINUED | OUTPATIENT
Start: 2023-01-19 | End: 2023-01-19

## 2023-01-19 RX ORDER — PROMETHAZINE HYDROCHLORIDE 25 MG/1
25 SUPPOSITORY RECTAL ONCE AS NEEDED
Status: DISCONTINUED | OUTPATIENT
Start: 2023-01-19 | End: 2023-01-19 | Stop reason: HOSPADM

## 2023-01-19 RX ORDER — DEXAMETHASONE SODIUM PHOSPHATE 4 MG/ML
INJECTION, SOLUTION INTRA-ARTICULAR; INTRALESIONAL; INTRAMUSCULAR; INTRAVENOUS; SOFT TISSUE AS NEEDED
Status: DISCONTINUED | OUTPATIENT
Start: 2023-01-19 | End: 2023-01-19 | Stop reason: SURG

## 2023-01-19 RX ORDER — CEFAZOLIN SODIUM 2 G/100ML
2 INJECTION, SOLUTION INTRAVENOUS ONCE
Status: COMPLETED | OUTPATIENT
Start: 2023-01-19 | End: 2023-01-19

## 2023-01-19 RX ORDER — ACETAMINOPHEN 325 MG/1
650 TABLET ORAL ONCE
Status: COMPLETED | OUTPATIENT
Start: 2023-01-19 | End: 2023-01-19

## 2023-01-19 RX ORDER — ESTRADIOL 1 MG/1
1 TABLET ORAL DAILY
Qty: 90 TABLET | Refills: 4 | Status: SHIPPED | OUTPATIENT
Start: 2023-01-19

## 2023-01-19 RX ORDER — SODIUM CHLORIDE 9 MG/ML
INJECTION, SOLUTION INTRAVENOUS AS NEEDED
Status: DISCONTINUED | OUTPATIENT
Start: 2023-01-19 | End: 2023-01-19 | Stop reason: HOSPADM

## 2023-01-19 RX ORDER — MAGNESIUM HYDROXIDE 1200 MG/15ML
LIQUID ORAL AS NEEDED
Status: DISCONTINUED | OUTPATIENT
Start: 2023-01-19 | End: 2023-01-19 | Stop reason: HOSPADM

## 2023-01-19 RX ORDER — MELOXICAM 7.5 MG/1
7.5 TABLET ORAL ONCE AS NEEDED
Status: DISCONTINUED | OUTPATIENT
Start: 2023-01-19 | End: 2023-01-19 | Stop reason: HOSPADM

## 2023-01-19 RX ORDER — PROMETHAZINE HYDROCHLORIDE 25 MG/1
25 TABLET ORAL EVERY 6 HOURS PRN
Qty: 20 TABLET | Refills: 0 | Status: SHIPPED | OUTPATIENT
Start: 2023-01-19

## 2023-01-19 RX ORDER — PROMETHAZINE HYDROCHLORIDE 25 MG/1
25 TABLET ORAL ONCE AS NEEDED
Status: DISCONTINUED | OUTPATIENT
Start: 2023-01-19 | End: 2023-01-19 | Stop reason: HOSPADM

## 2023-01-19 RX ORDER — HEPARIN SODIUM 5000 [USP'U]/ML
5000 INJECTION, SOLUTION INTRAVENOUS; SUBCUTANEOUS ONCE
Status: DISCONTINUED | OUTPATIENT
Start: 2023-01-19 | End: 2023-01-19 | Stop reason: HOSPADM

## 2023-01-19 RX ORDER — ACETAMINOPHEN 500 MG
1000 TABLET ORAL ONCE
Status: COMPLETED | OUTPATIENT
Start: 2023-01-19 | End: 2023-01-19

## 2023-01-19 RX ORDER — ROCURONIUM BROMIDE 10 MG/ML
INJECTION, SOLUTION INTRAVENOUS AS NEEDED
Status: DISCONTINUED | OUTPATIENT
Start: 2023-01-19 | End: 2023-01-19 | Stop reason: SURG

## 2023-01-19 RX ORDER — ALBUTEROL SULFATE 90 UG/1
AEROSOL, METERED RESPIRATORY (INHALATION) AS NEEDED
Status: DISCONTINUED | OUTPATIENT
Start: 2023-01-19 | End: 2023-01-19 | Stop reason: SURG

## 2023-01-19 RX ORDER — DROPERIDOL 2.5 MG/ML
0.62 INJECTION, SOLUTION INTRAMUSCULAR; INTRAVENOUS ONCE AS NEEDED
Status: DISCONTINUED | OUTPATIENT
Start: 2023-01-19 | End: 2023-01-19 | Stop reason: HOSPADM

## 2023-01-19 RX ORDER — MELOXICAM 7.5 MG/1
7.5 TABLET ORAL DAILY
Qty: 20 TABLET | Refills: 0 | Status: SHIPPED | OUTPATIENT
Start: 2023-01-19

## 2023-01-19 RX ADMIN — PROPOFOL 200 MG: 10 INJECTION, EMULSION INTRAVENOUS at 09:36

## 2023-01-19 RX ADMIN — Medication 50 MG: at 09:37

## 2023-01-19 RX ADMIN — ACETAMINOPHEN 650 MG: 325 TABLET ORAL at 14:31

## 2023-01-19 RX ADMIN — ALBUTEROL SULFATE 3 PUFF: 90 AEROSOL, METERED RESPIRATORY (INHALATION) at 10:53

## 2023-01-19 RX ADMIN — ACETAMINOPHEN 1000 MG: 500 TABLET ORAL at 07:43

## 2023-01-19 RX ADMIN — LIDOCAINE HYDROCHLORIDE 50 MG: 10 INJECTION, SOLUTION EPIDURAL; INFILTRATION; INTRACAUDAL; PERINEURAL at 09:35

## 2023-01-19 RX ADMIN — FENTANYL CITRATE 100 MCG: 50 INJECTION, SOLUTION INTRAMUSCULAR; INTRAVENOUS at 08:26

## 2023-01-19 RX ADMIN — KETOROLAC TROMETHAMINE 30 MG: 30 INJECTION, SOLUTION INTRAMUSCULAR; INTRAVENOUS at 10:32

## 2023-01-19 RX ADMIN — LIDOCAINE HYDROCHLORIDE 0.5 ML: 10 INJECTION, SOLUTION EPIDURAL; INFILTRATION; INTRACAUDAL; PERINEURAL at 07:43

## 2023-01-19 RX ADMIN — ROCURONIUM BROMIDE 50 MG: 10 INJECTION, SOLUTION INTRAVENOUS at 09:38

## 2023-01-19 RX ADMIN — FENTANYL CITRATE 100 MCG: 50 INJECTION, SOLUTION INTRAMUSCULAR; INTRAVENOUS at 09:34

## 2023-01-19 RX ADMIN — ONDANSETRON 4 MG: 2 INJECTION INTRAMUSCULAR; INTRAVENOUS at 10:05

## 2023-01-19 RX ADMIN — GABAPENTIN 600 MG: 300 CAPSULE ORAL at 07:43

## 2023-01-19 RX ADMIN — SODIUM CHLORIDE, POTASSIUM CHLORIDE, SODIUM LACTATE AND CALCIUM CHLORIDE 9 ML/HR: 600; 310; 30; 20 INJECTION, SOLUTION INTRAVENOUS at 07:43

## 2023-01-19 RX ADMIN — DEXAMETHASONE SODIUM PHOSPHATE 8 MG: 4 INJECTION, SOLUTION INTRAMUSCULAR; INTRAVENOUS at 10:05

## 2023-01-19 RX ADMIN — SUGAMMADEX 200 MG: 100 INJECTION, SOLUTION INTRAVENOUS at 10:33

## 2023-01-19 RX ADMIN — FAMOTIDINE 20 MG: 20 TABLET, FILM COATED ORAL at 07:43

## 2023-01-19 RX ADMIN — FENTANYL CITRATE 100 MCG: 50 INJECTION, SOLUTION INTRAMUSCULAR; INTRAVENOUS at 10:25

## 2023-01-19 RX ADMIN — SODIUM CHLORIDE, POTASSIUM CHLORIDE, SODIUM LACTATE AND CALCIUM CHLORIDE: 600; 310; 30; 20 INJECTION, SOLUTION INTRAVENOUS at 10:29

## 2023-01-19 RX ADMIN — MIDAZOLAM HYDROCHLORIDE 2 MG: 1 INJECTION, SOLUTION INTRAMUSCULAR; INTRAVENOUS at 09:25

## 2023-01-19 RX ADMIN — CEFAZOLIN SODIUM 2 G: 2 INJECTION, SOLUTION INTRAVENOUS at 09:28

## 2023-01-19 NOTE — OP NOTE
Hysterectomy Procedure Note      Pre-operative Diagnosis: Endometriosis and Pelvic pain    Post-operative Diagnosis:     Operation: TRH with BSO    Surgeon: Angeles Miller MD     Assistants: Assistant: Gagan Bush RNFA was responsible for performing the following activities: Retraction, Suction, Irrigation, Suturing, Closing and Placing Dressing and their skilled assistance was necessary for the success of this case.       Anesthesia: General endotracheal anesthesia    Findings: Pelvis with post operative change s/p excision of endometriosis. No active lesions noted.     Estimated Blood Loss: Minimal, less than 25mL    Specimens: Uterus with bilateral tubes and ovaries    Complications:  None    Disposition: PACU - hemodynamically stable.      Procedure Details    The patient was seen in the Holding Room. The risks, benefits, complications, treatment options, and expected outcomes were discussed with the patient. The patient concurred with the proposed plan, giving informed consent. The patient was identified as Rona Thomson and the procedure verified as Robotic hysterectomy with bilateral salpingoopherectomy. A Time Out was held and the above information confirmed.    After induction of anesthesia, the patient was prepped and draped in the usual sterile manner. The uterus was sounded and fit with a Christina-Koh intrauterine manipulator.    The operators gloves were changed.  The supraumbilical area was injected with a dilute marcaine solution.  An 8mm supraumbilical incision was made. Intraperitoneal access was gained with the optical noncutting trocar under direct visualization. CO2 was used to insufflate the abdominopelvic cavity to a pressure of approximately 15 mm Hg. She was placed in maximum Trendelenburg. One additional 8mm port site was placed on the left and two additional 8mm ports sites were placed on the right.     The DaVinci system was docked and the procedure continued at the console.  The anatomy was inspected and adhesions were taken down with monopolar, sharp and blunt dissection. The course of the right and left ureters was identified. The right infundibulopelvic ligament with identified, fulgurated and transected. The remainder of the broad ligament was fulguration and transected. The bladder was dissected across the midline with monopolar, sharp and blunt dissection. The left ureter was identified.  The left infundibulopelvic ligament was isolated, fulgurated and transected.  The remainder of the broad ligament, including the round ligament was fulgurated and transected. The uterine vessels on the left and right were isolated, fulgurated and transected. A circumferential incision was made along the cervicovaginal junction and the uterus was removed from the vagina. The vaginal cuff was then closed with 2-0 vicryl and 0 vicryl. There was good hemostasis at low pressure.     The DaVinci system was undocked and the procedure was completed at the bedside. The trocars were removed. The skin incisions were closed with 3-0 plain and reinforced with skin glue. The counts were correct x 2. Patient was awakened and taken to recover in stable condition.       Angeles Miller MD  01/19/23   10:47 EST

## 2023-01-19 NOTE — ANESTHESIA PROCEDURE NOTES
Airway  Urgency: elective    Date/Time: 1/19/2023 9:46 AM  Airway not difficult    General Information and Staff    Patient location during procedure: OR    Indications and Patient Condition  Indications for airway management: airway protection    Preoxygenated: yes  MILS not maintained throughout  Mask difficulty assessment: 2 - vent by mask + OA or adjuvant +/- NMBA    Final Airway Details  Final airway type: endotracheal airway      Successful airway: ETT  Cuffed: yes   Successful intubation technique: direct laryngoscopy  Facilitating devices/methods: intubating stylet  Endotracheal tube insertion site: oral  Blade: Lauri  Blade size: 3  ETT size (mm): 7.0  Cormack-Lehane Classification: grade I - full view of glottis  Placement verified by: chest auscultation and capnometry   Inital cuff pressure (cm H2O): 7  Measured from: lips  ETT/EBT  to lips (cm): 21  Number of attempts at approach: 1  Assessment: lips, teeth, and gum same as pre-op and atraumatic intubation    Additional Comments  Negative epigastric sounds, Breath sound equal bilaterally with symmetric chest rise and fall

## 2023-01-19 NOTE — H&P
Pre-Op H&P  Ronacory Thomson  [id number]  1975      Chief complaint: Pelvic pain      Subjective:  Patient is a 47 y.o.female presents for scheduled surgery by Dr. Miller. She anticipates a TOTAL LAPAROSCOPIC HYSTERECTOMY BILATERAL SALPINGO OOPHORECTOMY WITH DAVINCI ROBOT today. She has a long history of pelvic pain associated with endometriosis. She denies heavy or irregular periods. She denies changes in bowel habits. She has had 3 previous excision of endometriosis.       Review of Systems:  Constitutional-- No fever, chills or sweats. No fatigue.  CV-- No chest pain, palpitation or syncope. +HLD  Resp-- No SOB, cough, hemoptysis  Skin--No rashes or lesions      Allergies:   Allergies   Allergen Reactions   • Ultram [Tramadol] Nausea And Vomiting and Headache         Home Meds:  Medications Prior to Admission   Medication Sig Dispense Refill Last Dose   • acyclovir (ZOVIRAX) 5 % ointment Apply  topically Every 4 (Four) Hours While Awake. 30 g 0 Past Week   • cetirizine (ZyrTEC) 10 MG tablet Take 10 mg by mouth daily.   Past Week   • cholecalciferol (VITAMIN D3) 1000 UNITS tablet Take 1,000 Units by mouth daily.   Past Week   • fluticasone (FLONASE) 50 MCG/ACT nasal spray 2 sprays by Each Nare route As Needed for Allergies.   Past Week   • Mucus Relief 600 MG 12 hr tablet Take 600 mg by mouth Every 12 (Twelve) Hours As Needed for Cough or Congestion.   1/18/2023   • multivitamin with minerals tablet tablet Take 1 tablet by mouth Daily.   Past Week   • pantoprazole (PROTONIX) 40 MG EC tablet Take 40 mg by mouth Daily.   Past Week   • rosuvastatin (CRESTOR) 10 MG tablet Take 1 tablet by mouth Daily. 90 tablet 3 1/18/2023   • TiZANidine (ZANAFLEX) 6 MG capsule Take 1 capsule by mouth 3 (Three) Times a Day. (Patient taking differently: Take 6 mg by mouth Daily As Needed for Muscle Spasms.) 270 capsule 1 Past Month   • vitamin B-12 (CYANOCOBALAMIN) 1000 MCG tablet Take 1,000 mcg by mouth daily.   Past Week   •  albuterol sulfate  (90 Base) MCG/ACT inhaler FOR INTERMITTENT COUGH   More than a month   • diclofenac (VOLTAREN) 1 % gel gel PLEASE SEE ATTACHED FOR DETAILED DIRECTIONS (Patient taking differently: Daily As Needed.) 100 g 0 More than a month   • valACYclovir (Valtrex) 1000 MG tablet Take 2 tablets by mouth 2 (Two) Times a Day. (Patient taking differently: Take 2,000 mg by mouth Daily As Needed.) 4 tablet 5 More than a month         PMH:   Past Medical History:   Diagnosis Date   • Anxiety    • Corneal erosion of right eye    • Degenerative disc disease, cervical    • DVT (deep venous thrombosis) ([facility]) 2016    after diagnostic lap   • Elevated cholesterol    • Endometriosis    • GERD (gastroesophageal reflux disease)    • Heart palpitations     HX OF PER PT   • Neck pain    • PONV (postoperative nausea and vomiting)    • Scoliosis 1988    Dx as a child   • Wears contact lenses    • Wears eyeglasses      PSH:    Past Surgical History:   Procedure Laterality Date   • BASAL CELL CARCINOMA EXCISION  11/21/2019   • CHOLECYSTECTOMY  2014   • COLONOSCOPY  2014    polyps removed   • DIAGNOSTIC LAPAROSCOPY  12/2014    x 2 other in 2005   • DIAGNOSTIC LAPAROSCOPY N/A 07/15/2016    Procedure: ROBOTIC EXCISION OF ENDOMETRIOSIS, diagnostic laproscopy, bilateral uretero lysis;  Surgeon: Angeles Miller MD;  [facility]: [facility];  Service:    • ENDOSCOPY  2014    polyps removed from stomach        Immunization History:  Influenza: 2022  Pneumococcal: No  Tetanus: UTD   Covid x2: 2021    Social History:   Tobacco:   Social History     Tobacco Use   Smoking Status Former   • Packs/day: 0.00   • Years: 0.00   • Pack years: 0.00   • Types: Cigarettes   • Start date: 6/1/1995   • Quit date: 2/1/2012   • Years since quitting: 10.9   Smokeless Tobacco Never   Tobacco Comments    so glad i was able to quit      Alcohol:     Social History     Substance and Sexual Activity   Alcohol Use No         Physical Exam:/72 (BP  "Location: Right arm, Patient Position: Lying)   Pulse 76   Temp 98.4 °F (36.9 °C) (Temporal)   Resp 16   Ht 165.1 cm (65\")   Wt 81.6 kg (180 lb)   SpO2 96%   BMI 29.95 kg/m²       General Appearance:    Alert, cooperative, no distress, appears stated age   Head:    Normocephalic, without obvious abnormality, atraumatic   Lungs:     Clear to auscultation bilaterally, respirations unlabored    Heart:   Regular rate and rhythm, S1 and S2 normal    Abdomen:    Soft without tenderness   Extremities:   Extremities normal, atraumatic, no cyanosis or edema   Skin:   Skin color, texture, turgor normal, no rashes or lesions   Neurologic:   Grossly intact     Results Review:     LABS:  Lab Results   Component Value Date    WBC 8.74 01/11/2023    HGB 13.7 01/11/2023    HCT 40.6 01/11/2023    MCV 96.7 01/11/2023     01/11/2023    NEUTROABS 5.32 01/11/2023    GLUCOSE 81 07/12/2022    BUN 13 07/12/2022    CREATININE 0.72 07/12/2022    EGFRIFNONA 74 07/09/2021    EGFRIFAFRI 105 07/02/2019     07/12/2022    K 4.3 07/12/2022     07/12/2022    CO2 25.7 07/12/2022    CALCIUM 9.2 07/12/2022    ALBUMIN 4.30 07/12/2022    AST 15 07/12/2022    ALT 14 07/12/2022    BILITOT 0.5 07/12/2022       RADIOLOGY:  Imaging Results (Last 72 Hours)     ** No results found for the last 72 hours. **          I reviewed the patient's new clinical results.    Cancer Staging (if applicable)  Cancer Patient: __ yes __no __unknown; If yes, clinical stage T:__ N:__M:__, stage group or __N/A      Impression: Menorrhagia, premenopausal       Plan: TOTAL LAPAROSCOPIC HYSTERECTOMY BILATERAL SALPINGO OOPHORECTOMY WITH DAVINCI ROBOT      Nohemy Brendan, LESLEY   1/19/2023   07:57 EST  "

## 2023-01-19 NOTE — OUTREACH NOTE
Prep Survey    Flowsheet Row Responses   [facility] patient discharged from? [place]   Is LACE score < 7 ? Yes   Eligibility [facility]   Date of Admission 01/19/23   Date of Discharge 01/19/23   Discharge Disposition Home or Self Care   Discharge diagnosis TOTAL LAPAROSCOPIC HYSTERECTOMY BILATERAL SALPINGO OOPHORECTOMY WITH DAVINCI ROBOT   Does the patient have one of the following disease processes/diagnoses(primary or secondary)? General Surgery   Does the patient have Home health ordered? No   Is there a DME ordered? No   Prep survey completed? Yes          FRANKI FLOR - Registered Nurse

## 2023-01-19 NOTE — ANESTHESIA PREPROCEDURE EVALUATION
Anesthesia Evaluation     history of anesthetic complications:               Airway   Mallampati: I  TM distance: >3 FB  Neck ROM: full  No difficulty expected  Dental      Pulmonary    (+) recent URI,     ROS comment: Tail end (2 weeks) of URI with cough, still coughing, warned of possible resp complications wishes to proceed.  Cardiovascular     ECG reviewed    (+) DVT, hyperlipidemia,     ROS comment: Neg stress test 2016    Neuro/Psych  (+) psychiatric history Depression and Anxiety,    GI/Hepatic/Renal/Endo    (+)  GERD,      Musculoskeletal     (+) neck pain,   Abdominal    Substance History      OB/GYN          Other   arthritis,                    Anesthesia Plan    ASA 2     general     intravenous induction     Anesthetic plan, risks, benefits, and alternatives have been provided, discussed and informed consent has been obtained with: patient.    Plan discussed with CRNA.        CODE STATUS:

## 2023-01-19 NOTE — ANESTHESIA POSTPROCEDURE EVALUATION
Patient: Rona Thomson    Procedure Summary     Date: 01/19/23 [facility] / [facility]    Anesthesia Start: 0928 Anesthesia Stop: 1102    Procedure: TOTAL LAPAROSCOPIC HYSTERECTOMY BILATERAL SALPINGO OOPHORECTOMY WITH DAVINCI ROBOT (Abdomen) Diagnosis:     Surgeons: Angeles Miller MD Provider: Cameron Elizabeth MD    Anesthesia Type: general ASA Status: 2          Anesthesia Type: general    Vitals  Vitals Value Taken Time   /68 01/19/23 1059   Temp 97 °F (36.1 °C) 01/19/23 1059   Pulse 81 01/19/23 1101   Resp 16 01/19/23 1059   SpO2 90 % 01/19/23 1101   Vitals shown include unvalidated device data.        Post Anesthesia Care and Evaluation    Patient location during evaluation: PACU  Patient participation: complete - patient participated  Level of consciousness: responsive to light touch  Pain management: adequate    Airway patency: patent  Anesthetic complications: No anesthetic complications  PONV Status: none  Cardiovascular status: hemodynamically stable and acceptable  Respiratory status: nonlabored ventilation, acceptable and nasal cannula  Hydration status: acceptable

## 2023-01-20 ENCOUNTER — TRANSITIONAL CARE MANAGEMENT TELEPHONE ENCOUNTER (OUTPATIENT)
Dept: CALL CENTER | Facility: HOSPITAL | Age: 48
End: 2023-01-20
Payer: COMMERCIAL

## 2023-01-20 LAB
CYTO UR: NORMAL
LAB AP CASE REPORT: NORMAL
LAB AP CLINICAL INFORMATION: NORMAL
PATH REPORT.FINAL DX SPEC: NORMAL
PATH REPORT.GROSS SPEC: NORMAL

## 2023-01-20 NOTE — OUTREACH NOTE
Call Center TCM Note    Flowsheet Row Responses   [facility] patient discharged from? [place]   Does the patient have one of the following disease processes/diagnoses(primary or secondary)? General Surgery   TCM attempt successful? No  [verbal release for Deniz, ]   Unsuccessful attempts Attempt 1   Call Status Left message          Francheska Deng RN    1/20/2023, 13:39 EST

## 2023-01-20 NOTE — OUTREACH NOTE
Call Center TCM Note    Flowsheet Row Responses   [facility] patient discharged from? [place]   Does the patient have one of the following disease processes/diagnoses(primary or secondary)? General Surgery   TCM attempt successful? No   Unsuccessful attempts Attempt 2          Francheska Deng RN    1/20/2023, 16:13 EST

## 2023-01-21 ENCOUNTER — TRANSITIONAL CARE MANAGEMENT TELEPHONE ENCOUNTER (OUTPATIENT)
Dept: CALL CENTER | Facility: HOSPITAL | Age: 48
End: 2023-01-21
Payer: COMMERCIAL

## 2023-01-21 NOTE — OUTREACH NOTE
Call Center TCM Note    Flowsheet Row Responses   [facility] patient discharged from? [place]   Does the patient have one of the following disease processes/diagnoses(primary or secondary)? General Surgery   TCM attempt successful? Yes   Call start time 1140   Call end time 1146   Discharge diagnosis TOTAL LAPAROSCOPIC HYSTERECTOMY BILATERAL SALPINGO OOPHORECTOMY WITH DAVINCI ROBOT   Meds reviewed with patient/caregiver? Yes   Is the patient having any side effects they believe may be caused by any medication additions or changes? No   Does the patient have all medications related to this admission filled (includes all antibiotics, pain medications, etc.) Yes   Is the patient taking all medications as directed (includes completed medication regime)? Yes   Comments 2/15/23 surgery FU apt    Does the patient have an appointment with their PCP within 7 days of discharge? No   Nursing Interventions Patient declined scheduling/rescheduling appointment at this time, Routed TCM call to PCP office, PCP office requested to make appointment - message sent   Has home health visited the patient within 72 hours of discharge? N/A   Psychosocial issues? No   Did the patient receive a copy of their discharge instructions? No  [pt states she will check if she did receive them but doesn't recall at this time]   Nursing interventions Reviewed instructions with patient   What is the patient's perception of their health status since discharge? Improving   Nursing interventions Nurse provided patient education   Is the patient /caregiver able to teach back basic post-op care? Keep incision areas clean,dry and protected, Drive as instructed by MD in discharge instructions, Take showers only when approved by MD-sponge bathe until then, No tub bath, swimming, or hot tub until instructed by MD, Continue use of incentive spirometry at least 1 week post discharge   Is the patient/caregiver able to teach back signs and symptoms of  incisional infection? Increased redness, swelling or pain at the incisonal site, Increased drainage or bleeding, Incisional warmth, Pus or odor from incision, Fever   Is the patient/caregiver able to teach back steps to recovery at home? Set small, achievable goals for return to baseline health, Rest and rebuild strength, gradually increase activity   If the patient is a current smoker, are they able to teach back resources for cessation? Not a smoker   Is the patient/caregiver able to teach back the hierarchy of who to call/visit for symptoms/problems? PCP, Specialist, Home health nurse, Urgent Care, ED, 911 Yes   TCM call completed? Yes   Call end time [phone]          Tatiana Dye RN    1/21/2023, 11:49 EST

## 2023-01-27 ENCOUNTER — TELEMEDICINE (OUTPATIENT)
Dept: INTERNAL MEDICINE | Facility: CLINIC | Age: 48
End: 2023-01-27
Payer: COMMERCIAL

## 2023-01-27 DIAGNOSIS — S70.11XA HEMATOMA OF RIGHT THIGH, INITIAL ENCOUNTER: ICD-10-CM

## 2023-01-27 DIAGNOSIS — Z90.710 S/P LAPAROSCOPIC HYSTERECTOMY: Primary | ICD-10-CM

## 2023-01-27 DIAGNOSIS — R05.1 ACUTE COUGH: ICD-10-CM

## 2023-01-27 PROCEDURE — 99214 OFFICE O/P EST MOD 30 MIN: CPT | Performed by: PHYSICIAN ASSISTANT

## 2023-01-27 NOTE — PROGRESS NOTES
"Chief Complaint   Patient presents with   • Hospital Follow Up Visit     Total abdominal hysterectomy       Subjective   Rona Thomson is a 47 y.o. female.       History of Present Illness     This was an audio and video enabled telemedicine encounter.    The patient presents today via video visit for a transitional care hospital follow-up visit.    Patient was admitted to the hospital for her total laparoscopic hysterectomy on 01/19/2023 and discharged home the same day. She states everything went as expected. The pathology report showed adenomyosis. She states she used her last 2 eggs in 09/2022 and one of them took and she miscarried it. She reports she had a really hard time emotionally with that. She states she is still working through all of that. She reports she has had mild pain occasionally. She states she has not had her follow-up with her surgeon yet. She has 4 incisions. She states she took all of her bandages off after 3 or 4 days.     She reports her pain level is a 2 or 3 out of 10. She states it is an uncomfortable feeling. She reports she is not taking the pain medication anymore. She takes Tylenol or ibuprofen as needed and has been taking it for the last 4 days. She states she has not needed anything significant other than the Tylenol and ibuprofen.    Patient is on Lovenox shots for the next 5 more weeks. She states she had a blood clot scare, and was in the emergency room for 5 hours the night after her last surgery. She reports she is having some bruising but otherwise tolerating well.     She reports she had a tender area on her skin superficially on her leg. She states she did not have it before she went in for surgery. She states when she got home, it was black and \"a hard knot.\"  She states she took a picture of it and sent it to her nurse. She reports she was told it was probably nothing to be concerned about. She states a week later, it is still really sore and there is still a knot " "there. The bruising is improving and starting to look old and yellowish.    She complains of a productive cough. She reports she finished the antibiotic on 01/11/2023 and is still coughing up green sputum. She reports a week later, she feels a \"squishiness\" in her ear. She states she stopped her supplements except the cholesterol medication prior to the surgery. She reports she has been doing more cough drops. She states now that she is not as sore, she is thinking about trying Mucinex. She reports they did not do any antibiotics post surgery.     She has a follow-up appointment with her surgeon on 02/15/2023.      Current Outpatient Medications:   •  acetaminophen (TYLENOL) 325 MG tablet, Take 2 tablets by mouth Every 4 (Four) Hours while awake for 7 days. Then take only As Needed for Mild Pain thereafter., Disp: 100 tablet, Rfl: 0  •  acyclovir (ZOVIRAX) 5 % ointment, Apply  topically Every 4 (Four) Hours While Awake., Disp: 30 g, Rfl: 0  •  albuterol sulfate  (90 Base) MCG/ACT inhaler, FOR INTERMITTENT COUGH, Disp: , Rfl:   •  cetirizine (ZyrTEC) 10 MG tablet, Take 10 mg by mouth daily., Disp: , Rfl:   •  cholecalciferol (VITAMIN D3) 1000 UNITS tablet, Take 1,000 Units by mouth daily., Disp: , Rfl:   •  diclofenac (VOLTAREN) 1 % gel gel, PLEASE SEE ATTACHED FOR DETAILED DIRECTIONS (Patient taking differently: Daily As Needed.), Disp: 100 g, Rfl: 0  •  Enoxaparin Sodium (LOVENOX) 40 MG/0.4ML solution prefilled syringe syringe, Inject 0.4 mL under the skin into the appropriate area as directed Every 12 (Twelve) Hours., Disp: 60 mL, Rfl: 0  •  estradiol (ESTRACE) 1 MG tablet, Take 1 tablet by mouth Daily., Disp: 90 tablet, Rfl: 4  •  fluticasone (FLONASE) 50 MCG/ACT nasal spray, 2 sprays by Each Nare route As Needed for Allergies., Disp: , Rfl:   •  meloxicam (MOBIC) 7.5 MG tablet, Take 1 tablet by mouth daily for 7 days. Then take only as needed thereafter., Disp: 20 tablet, Rfl: 0  •  Mucus Relief 600 MG 12 " hr tablet, Take 600 mg by mouth Every 12 (Twelve) Hours As Needed for Cough or Congestion., Disp: , Rfl:   •  multivitamin with minerals tablet tablet, Take 1 tablet by mouth Daily., Disp: , Rfl:   •  oxyCODONE (ROXICODONE) 5 MG immediate release tablet, Take 1 tablet by mouth Every 4 (Four) Hours As Needed for Moderate Pain., Disp: 10 tablet, Rfl: 0  •  pantoprazole (PROTONIX) 40 MG EC tablet, Take 40 mg by mouth Daily., Disp: , Rfl:   •  polyethylene glycol (MIRALAX) 17 GM/SCOOP powder, Mix 17 grams in 8 ounces of liquid and take by mouth daily for 2 weeks to prevent constipation, then use as needed. May hold for loose stool, Disp: 238 g, Rfl: 0  •  promethazine (PHENERGAN) 25 MG tablet, Take 1 tablet by mouth Every 6 (Six) Hours As Needed for Nausea or Vomiting., Disp: 20 tablet, Rfl: 0  •  rosuvastatin (CRESTOR) 10 MG tablet, Take 1 tablet by mouth Daily., Disp: 90 tablet, Rfl: 3  •  TiZANidine (ZANAFLEX) 6 MG capsule, Take 1 capsule by mouth 3 (Three) Times a Day., Disp: 270 capsule, Rfl: 1  •  valACYclovir (Valtrex) 1000 MG tablet, Take 2 tablets by mouth 2 (Two) Times a Day., Disp: 4 tablet, Rfl: 5  •  vitamin B-12 (CYANOCOBALAMIN) 1000 MCG tablet, Take 1,000 mcg by mouth daily., Disp: , Rfl:      PMFSH  The following portions of the patient's history were reviewed and updated as appropriate: allergies, current medications, past family history, past medical history, past social history, past surgical history and problem list.    Review of Systems   Constitutional: Negative for activity change, appetite change and fatigue.   HENT: Positive for congestion and rhinorrhea.    Respiratory: Negative for chest tightness and shortness of breath.    Cardiovascular: Negative for chest pain and palpitations.   Gastrointestinal: Negative for abdominal pain, diarrhea and nausea.   Genitourinary: Negative for dysuria.   Musculoskeletal: Negative for arthralgias and myalgias.   Neurological: Negative for dizziness,  weakness, light-headedness and headaches.   Psychiatric/Behavioral: Negative for dysphoric mood. The patient is not nervous/anxious.        Objective   There were no vitals taken for this visit.    Physical Exam  Constitutional:       Appearance: She is well-developed.   HENT:      Head: Normocephalic and atraumatic.      Right Ear: External ear normal.      Left Ear: External ear normal.      Nose: Nose normal.   Eyes:      Conjunctiva/sclera: Conjunctivae normal.   Cardiovascular:      Rate and Rhythm: Normal rate.   Pulmonary:      Effort: Pulmonary effort is normal.   Musculoskeletal:         General: Normal range of motion.      Cervical back: Normal range of motion.   Neurological:      Mental Status: She is alert and oriented to person, place, and time.   Psychiatric:         Behavior: Behavior normal.         Thought Content: Thought content normal.         Judgment: Judgment normal.         Results for orders placed or performed during the hospital encounter of 01/19/23   POC Pregnancy, Urine    Specimen: Urine   Result Value Ref Range    HCG, Urine, QL Negative Negative    Lot Number [id number]     Internal Positive Control Passed Positive, Passed    Internal Negative Control Passed Negative, Passed    Expiration Date 2/24    Tissue Pathology Exam    Specimen: Uterus with Cervix, Bilateral Tubes and Ovaries; Tissue   Result Value Ref Range    Case Report       Surgical Pathology Report                         Case: [id number]                                  Authorizing Provider:  Angeles Miller MD      Collected:           01/19/2023 10:11 AM          Ordering Location:     [facility]   Received:            01/19/2023 10:43 AM                                 OR                                                                           Pathologist:           Caleb Denise MD                                                            Specimen:    Uterus with Cervix, Bilateral Tubes and  Ovaries                                            Clinical Information       Menorrhagia, premenopausal      Final Diagnosis       UTERUS, CERVIX, BILATERAL FALLOPIAN TUBES AND OVARIES, SIMPLE HYSTERECTOMY AND BILATERAL SALPINGO-OOPHORECTOMY:  Cervix:   No significant pathologic change.  Uterine Corpus:   Benign endometrium.   Adenomyosis.   Leiomyomata.   Negative for atypia or malignancy.  Bilateral Fallopian Tubes and Ovaries:   Left Fallopian tube with focal endometriosis.                Ovaries and right fallopian tube with no significant pathologic change.                Negative for atypia or malignancy.  GJK      Gross Description       1. Uterus with Cervix, Bilateral Tubes and Ovaries.  Received in formalin labeled uterus with cervix, bilateral tubes and ovaries is a 7.5 cm superior to inferior, 4.5 cm cornu to cornu, 2.7 cm anterior to posterior, 77.7 g simple hysterectomy with bilateral attached fallopian tubes and ovaries.  The serosa is tan-pink and glistening with at least 1 subserosal nodule at the fundus up to 1.1 cm in greatest dimension.  The tan, glistening ectocervix is 2.7 x 2.7 cm and the slitlike os is 0.6 x 0.2 cm.  The endocervix is tan-red and corrugated.  The U-shaped endometrial cavity is 4 cm in length with a cornu to cornu width of 2.5 cm.  The endometrium is smooth, tan-red and glistening.  No masses or polyps are grossly identified.  The endometrial thickness averages 0.1 cm and the myometrial thickness averages 1.7 cm.  The myometrium is firm and tan.  At least 2 intramural white nodules are identified up to 0.6 cm in greatest dimension.  The nodules have white, whorled cut surfaces.  Bilateral tan, cerebriform ovaries average 2 x 1.2 x 1 cm.  Sectioning of both ovaries reveals grossly unremarkable cut surfaces.  The bilateral fimbriated fallopian tubes average 4.5 cm in length by 0.5 cm in diameter.  Sectioning of both tubes reveals a grossly unremarkable lumen.  Representative  sections are submitted as follows: 1A-serosa and white nodules; 1B-anterior cervix; 1C-posterior cervix; 1D-anterior endomyometrium; 1E-posterior endomyometrium; 1F-right ovary; 1G-left ovary; 1H-right fallopian tube and fimbria; 1I-left fallopian tube and fimbria.  LDP        Microscopic Description       The slides are reviewed and demonstrate histopathologic features supporting the above rendered diagnosis.            ASSESSMENT/PLAN    Diagnoses and all orders for this visit:    1. S/P laparoscopic hysterectomy (Primary)  Comments:  Recovering without complication. Continue to progress activity as tolerated and as advised by gyn.     2. Hematoma of right thigh, initial encounter  Comments:  Suspect hematoma based on description, unable to fully visualize on video. Continue to monitor for resolution. Use warm compresses and massage for symptoms.    3. Acute cough  Comments:  Restart mucinex and antihistamine. Call if not resolving.                    She was advised to inform us of any concerns or worsening problems.    Return for Next scheduled follow up.       Transcribed from ambient dictation for FAROOQ Berrios by Misty Yanez.  01/27/23   13:24 EST    Patient or patient representative verbalized consent to the visit recording.  I have personally performed the services described in this document as transcribed by the above individual, and it is both accurate and complete.

## 2023-02-13 LAB
ABO GROUP BLD: NORMAL
ALBUMIN SERPL-MCNC: 4.4 G/DL (ref 3.5–5.2)
ALBUMIN/GLOB SERPL: 1.5 G/DL
ALP SERPL-CCNC: 81 U/L (ref 39–117)
ALT SERPL W P-5'-P-CCNC: 53 U/L (ref 1–33)
ANION GAP SERPL CALCULATED.3IONS-SCNC: 11 MMOL/L (ref 5–15)
AST SERPL-CCNC: 36 U/L (ref 1–32)
BASOPHILS # BLD AUTO: 0.1 10*3/MM3 (ref 0–0.2)
BASOPHILS NFR BLD AUTO: 0.9 % (ref 0–1.5)
BILIRUB SERPL-MCNC: 0.4 MG/DL (ref 0–1.2)
BLD GP AB SCN SERPL QL: NEGATIVE
BUN SERPL-MCNC: 17 MG/DL (ref 6–20)
BUN/CREAT SERPL: 22.7 (ref 7–25)
CALCIUM SPEC-SCNC: 9.6 MG/DL (ref 8.6–10.5)
CHLORIDE SERPL-SCNC: 105 MMOL/L (ref 98–107)
CO2 SERPL-SCNC: 24 MMOL/L (ref 22–29)
CREAT SERPL-MCNC: 0.75 MG/DL (ref 0.57–1)
DEPRECATED RDW RBC AUTO: 41.3 FL (ref 37–54)
EGFRCR SERPLBLD CKD-EPI 2021: 100.2 ML/MIN/1.73
EOSINOPHIL # BLD AUTO: 0.49 10*3/MM3 (ref 0–0.4)
EOSINOPHIL NFR BLD AUTO: 4.5 % (ref 0.3–6.2)
ERYTHROCYTE [DISTWIDTH] IN BLOOD BY AUTOMATED COUNT: 11.8 % (ref 12.3–15.4)
GLOBULIN UR ELPH-MCNC: 3 GM/DL
GLUCOSE SERPL-MCNC: 122 MG/DL (ref 65–99)
HCT VFR BLD AUTO: 41.3 % (ref 34–46.6)
HGB BLD-MCNC: 14.3 G/DL (ref 12–15.9)
HOLD SPECIMEN: NORMAL
HOLD SPECIMEN: NORMAL
IMM GRANULOCYTES # BLD AUTO: 0.04 10*3/MM3 (ref 0–0.05)
IMM GRANULOCYTES NFR BLD AUTO: 0.4 % (ref 0–0.5)
LYMPHOCYTES # BLD AUTO: 3.05 10*3/MM3 (ref 0.7–3.1)
LYMPHOCYTES NFR BLD AUTO: 28.2 % (ref 19.6–45.3)
MCH RBC QN AUTO: 32.9 PG (ref 26.6–33)
MCHC RBC AUTO-ENTMCNC: 34.6 G/DL (ref 31.5–35.7)
MCV RBC AUTO: 95.2 FL (ref 79–97)
MONOCYTES # BLD AUTO: 1.22 10*3/MM3 (ref 0.1–0.9)
MONOCYTES NFR BLD AUTO: 11.3 % (ref 5–12)
NEUTROPHILS NFR BLD AUTO: 5.9 10*3/MM3 (ref 1.7–7)
NEUTROPHILS NFR BLD AUTO: 54.7 % (ref 42.7–76)
NRBC BLD AUTO-RTO: 0 /100 WBC (ref 0–0.2)
PLATELET # BLD AUTO: 338 10*3/MM3 (ref 140–450)
PMV BLD AUTO: 10 FL (ref 6–12)
POTASSIUM SERPL-SCNC: 4 MMOL/L (ref 3.5–5.2)
PROT SERPL-MCNC: 7.4 G/DL (ref 6–8.5)
RBC # BLD AUTO: 4.34 10*6/MM3 (ref 3.77–5.28)
RH BLD: POSITIVE
SODIUM SERPL-SCNC: 140 MMOL/L (ref 136–145)
T&S EXPIRATION DATE: NORMAL
WBC NRBC COR # BLD: 10.8 10*3/MM3 (ref 3.4–10.8)
WHOLE BLOOD HOLD COAG: NORMAL
WHOLE BLOOD HOLD SPECIMEN: NORMAL

## 2023-02-13 PROCEDURE — 86901 BLOOD TYPING SEROLOGIC RH(D): CPT

## 2023-02-13 PROCEDURE — 99283 EMERGENCY DEPT VISIT LOW MDM: CPT

## 2023-02-13 PROCEDURE — 86850 RBC ANTIBODY SCREEN: CPT

## 2023-02-13 PROCEDURE — 36415 COLL VENOUS BLD VENIPUNCTURE: CPT

## 2023-02-13 PROCEDURE — 85025 COMPLETE CBC W/AUTO DIFF WBC: CPT

## 2023-02-13 PROCEDURE — 86900 BLOOD TYPING SEROLOGIC ABO: CPT

## 2023-02-13 PROCEDURE — 80053 COMPREHEN METABOLIC PANEL: CPT

## 2023-02-13 RX ORDER — SODIUM CHLORIDE 0.9 % (FLUSH) 0.9 %
10 SYRINGE (ML) INJECTION AS NEEDED
Status: DISCONTINUED | OUTPATIENT
Start: 2023-02-13 | End: 2023-02-14 | Stop reason: HOSPADM

## 2023-02-14 ENCOUNTER — APPOINTMENT (OUTPATIENT)
Dept: CT IMAGING | Facility: HOSPITAL | Age: 48
End: 2023-02-14
Payer: COMMERCIAL

## 2023-02-14 ENCOUNTER — HOSPITAL ENCOUNTER (EMERGENCY)
Facility: HOSPITAL | Age: 48
Discharge: HOME | End: 2023-02-14
Attending: STUDENT IN AN ORGANIZED HEALTH CARE EDUCATION/TRAINING PROGRAM | Admitting: STUDENT IN AN ORGANIZED HEALTH CARE EDUCATION/TRAINING PROGRAM
Payer: COMMERCIAL

## 2023-02-14 VITALS
SYSTOLIC BLOOD PRESSURE: 92 MMHG | WEIGHT: 175 LBS | DIASTOLIC BLOOD PRESSURE: 49 MMHG | TEMPERATURE: 98.6 F | HEART RATE: 62 BPM | RESPIRATION RATE: 18 BRPM | BODY MASS INDEX: 29.16 KG/M2 | OXYGEN SATURATION: 93 % | HEIGHT: 65 IN

## 2023-02-14 DIAGNOSIS — N93.9 VAGINAL BLEEDING: ICD-10-CM

## 2023-02-14 DIAGNOSIS — T81.9XXA COMPLICATION OF PROCEDURE, INITIAL ENCOUNTER: Primary | ICD-10-CM

## 2023-02-14 DIAGNOSIS — R10.2 VAGINAL PAIN: ICD-10-CM

## 2023-02-14 LAB
HCT VFR BLD AUTO: 43 % (ref 34–46.6)
HGB BLD-MCNC: 14.4 G/DL (ref 12–15.9)
HOLD SPECIMEN: NORMAL

## 2023-02-14 PROCEDURE — 85018 HEMOGLOBIN: CPT | Performed by: STUDENT IN AN ORGANIZED HEALTH CARE EDUCATION/TRAINING PROGRAM

## 2023-02-14 PROCEDURE — 25010000002 IOPAMIDOL 61 % SOLUTION: Performed by: STUDENT IN AN ORGANIZED HEALTH CARE EDUCATION/TRAINING PROGRAM

## 2023-02-14 PROCEDURE — 85014 HEMATOCRIT: CPT | Performed by: STUDENT IN AN ORGANIZED HEALTH CARE EDUCATION/TRAINING PROGRAM

## 2023-02-14 PROCEDURE — 74177 CT ABD & PELVIS W/CONTRAST: CPT

## 2023-02-14 PROCEDURE — 63710000001 PROMETHAZINE PER 25 MG: Performed by: STUDENT IN AN ORGANIZED HEALTH CARE EDUCATION/TRAINING PROGRAM

## 2023-02-14 RX ORDER — PROMETHAZINE HYDROCHLORIDE 25 MG/1
25 TABLET ORAL ONCE
Status: COMPLETED | OUTPATIENT
Start: 2023-02-14 | End: 2023-02-14

## 2023-02-14 RX ORDER — HYDROCODONE BITARTRATE AND ACETAMINOPHEN 5; 325 MG/1; MG/1
1 TABLET ORAL ONCE
Status: COMPLETED | OUTPATIENT
Start: 2023-02-14 | End: 2023-02-14

## 2023-02-14 RX ORDER — HYDROCODONE BITARTRATE AND ACETAMINOPHEN 5; 325 MG/1; MG/1
1 TABLET ORAL ONCE
Status: DISCONTINUED | OUTPATIENT
Start: 2023-02-14 | End: 2023-02-14 | Stop reason: HOSPADM

## 2023-02-14 RX ORDER — ACETAMINOPHEN 500 MG
1000 TABLET ORAL ONCE
Status: COMPLETED | OUTPATIENT
Start: 2023-02-14 | End: 2023-02-14

## 2023-02-14 RX ADMIN — PROMETHAZINE HYDROCHLORIDE 25 MG: 25 TABLET ORAL at 04:02

## 2023-02-14 RX ADMIN — IOPAMIDOL 89 ML: 612 INJECTION, SOLUTION INTRAVENOUS at 03:23

## 2023-02-14 RX ADMIN — HYDROCODONE BITARTRATE AND ACETAMINOPHEN 1 TABLET: 5; 325 TABLET ORAL at 04:02

## 2023-02-14 RX ADMIN — ACETAMINOPHEN 1000 MG: 500 TABLET ORAL at 07:45

## 2023-02-14 NOTE — PROGRESS NOTES
Laborist    ASHLYN was asked by Dr. Millan to evaluate patient in ED  Ms. Thomson is a 47-year-old female 4 weeks postop robotic total hysterectomy and BSO.  She complains of vaginal bleeding off-and-on since February 10.  Patient states passed several golf ball size clots and saturated several pads today with mild abdominal cramping.  Patient denies fever, change in bowel habits, nausea, vomiting, any other concerns.  Of note, she currently is on phylactic Lovenox due to history of postop DVT.    Physical exam  Patient alert orient x3 no acute distress  Vital signs stable afebrile heart rate 73 blood pressure 106/70 pulse ox 97% on room air  Abdomen soft, no guarding, no rebound benign exam.  Steri-Strips remain in place over trocar sites.  No sign of infection.   Sterile speculum exam:  Normal external genitalia.  Lighted speculum placed without difficulty.  Nickel size clot removed from vagina.  Vaginal cuff inspected.  Appeared intact.  There is a small raw area on the superior aspect of the cuff.  The area was cauterized with silver nitrate.  There are no sign of acute cellulitis or purulent drainage.  The speculum removed and reinserted after couple minutes.  No further active bleeding noted.  Bimanual examination revealed intact vaginal cuff no fullness noted in the anterior posterior cul-de-sac.  Patient noted be slightly tender anterior vaginal wall..    H&H 14/41 with platelet count 338,000 and normal PT and PTT    Plan: Recommended CT abdomen pelvis.            Notified Dr. Millan of findings.

## 2023-02-14 NOTE — ED PROVIDER NOTES
EMERGENCY DEPARTMENT ENCOUNTER    Pt Name: Rona Thomson  MRN: [id number]  Pt :   1975  Room Number:    Date of encounter:  2023  PCP: Esthela Gilmore PA  ED Provider: Sebastian Martinez MD    Historian: Patient, family      HPI:  Chief Complaint: Postoperative vaginal bleeding        Context: Rona Thomson is a 47-year-old woman who presents the emergency department for evaluation of persistent vaginal bleeding.  She is about 3 weeks postop total hysterectomy with Dr. Miller.  For the first 2 weeks she was recovering normally but about 5 days ago started having some spotting.  Then today the bleeding started increasing significantly.  She was seen in outside ED yesterday and was discharged after labs were normal.  She denies significant pain but feels like the bleeding is speeding up she has saturated about 10 pads today and about 4 since she has been in the emergency room.  She is scheduled to follow-up with gynecology tomorrow.  She presents here for evaluation.  She has no other complaints at this time.      PAST MEDICAL HISTORY  Past Medical History:   Diagnosis Date   • Anxiety    • Corneal erosion of right eye    • Degenerative disc disease, cervical    • DVT (deep venous thrombosis) (HCC) 2016    after diagnostic lap   • Elevated cholesterol    • Endometriosis    • GERD (gastroesophageal reflux disease)    • Heart palpitations     HX OF PER PT   • Neck pain    • PONV (postoperative nausea and vomiting)    • Scoliosis 1988    Dx as a child   • Wears contact lenses    • Wears eyeglasses          PAST SURGICAL HISTORY  Past Surgical History:   Procedure Laterality Date   • BASAL CELL CARCINOMA EXCISION  2019   • CHOLECYSTECTOMY     • COLONOSCOPY      polyps removed   • DIAGNOSTIC LAPAROSCOPY  12/2014    x 2 other in    • DIAGNOSTIC LAPAROSCOPY N/A 07/15/2016    Procedure: ROBOTIC EXCISION OF ENDOMETRIOSIS, diagnostic laproscopy, bilateral uretero lysis;   Surgeon: Angeles Miller MD;  Location:  [facility];  Service:    • ENDOSCOPY      polyps removed from stomach    • TOTAL LAPAROSCOPIC HYSTERECTOMY SALPINGO OOPHORECTOMY N/A 2023    Procedure: TOTAL LAPAROSCOPIC HYSTERECTOMY BILATERAL SALPINGO OOPHORECTOMY WITH DAVINCI ROBOT;  Surgeon: Angeles Miller MD;  Location:  [facility];  Service: Robotics - DaVinci;  Laterality: N/A;         FAMILY HISTORY  Family History   Problem Relation Age of Onset   • Cancer Father         Colon CA / DX    • Colon cancer Father    • Hypertension Father    • Colon cancer Maternal Grandmother    • Breast cancer Maternal Aunt 30   • Breast cancer Cousin 20   • Breast cancer Mother 63   • Cancer Mother         Breast CA / DX 2019   • Ovarian cancer Neg Hx          SOCIAL HISTORY  Social History     Socioeconomic History   • Marital status:    Tobacco Use   • Smoking status: Former     Packs/day: 0.00     Years: 0.00     Pack years: 0.00     Types: Cigarettes     Start date: 1995     Quit date: 2012     Years since quittin.0   • Smokeless tobacco: Never   • Tobacco comments:     so glad i was able to quit   Vaping Use   • Vaping Use: Never used   Substance and Sexual Activity   • Alcohol use: No   • Drug use: No   • Sexual activity: Defer         ALLERGIES  Ultram [tramadol]        REVIEW OF SYSTEMS  Review of Systems       All systems reviewed and negative except for those discussed in HPI.       PHYSICAL EXAM    I have reviewed the triage vital signs and nursing notes.    ED Triage Vitals [23]   Temp Heart Rate Resp BP SpO2   98.6 °F (37 °C) 96 18 121/85 96 %      Temp src Heart Rate Source Patient Position BP Location FiO2 (%)   Oral Monitor Sitting Left arm --       Physical Exam  GENERAL:   Appears in no acute distress.   HENT: Nares patent.  EYES: No scleral icterus.  CV: Regular rhythm, regular rate.  RESPIRATORY: Normal effort.  No audible wheezes, rales or rhonchi.  ABDOMEN: Soft,  nontender  Gynecologic: Trace red blood at the external vagina.  Bimanual exam shows extreme tenderness but vaginal cuff appears to be intact.  MUSCULOSKELETAL: No deformities.   NEURO: Alert, moves all extremities, follows commands.  SKIN: Warm, dry, no rash visualized.      LAB RESULTS  Recent Results (from the past 24 hour(s))   Comprehensive Metabolic Panel    Collection Time: 02/13/23  9:40 PM    Specimen: Blood   Result Value Ref Range    Glucose 122 (H) 65 - 99 mg/dL    BUN 17 6 - 20 mg/dL    Creatinine 0.75 0.57 - 1.00 mg/dL    Sodium 140 136 - 145 mmol/L    Potassium 4.0 3.5 - 5.2 mmol/L    Chloride 105 98 - 107 mmol/L    CO2 24.0 22.0 - 29.0 mmol/L    Calcium 9.6 8.6 - 10.5 mg/dL    Total Protein 7.4 6.0 - 8.5 g/dL    Albumin 4.4 3.5 - 5.2 g/dL    ALT (SGPT) 53 (H) 1 - 33 U/L    AST (SGOT) 36 (H) 1 - 32 U/L    Alkaline Phosphatase 81 39 - 117 U/L    Total Bilirubin 0.4 0.0 - 1.2 mg/dL    Globulin 3.0 gm/dL    A/G Ratio 1.5 g/dL    BUN/Creatinine Ratio 22.7 7.0 - 25.0    Anion Gap 11.0 5.0 - 15.0 mmol/L    eGFR 100.2 >60.0 mL/min/1.73   Type & Screen    Collection Time: 02/13/23  9:40 PM    Specimen: Blood   Result Value Ref Range    ABO Type O     RH type Positive     Antibody Screen Negative     T&S Expiration Date 2/16/2023 11:59:59 PM    CBC Auto Differential    Collection Time: 02/13/23  9:40 PM    Specimen: Blood   Result Value Ref Range    WBC 10.80 3.40 - 10.80 10*3/mm3    RBC 4.34 3.77 - 5.28 10*6/mm3    Hemoglobin 14.3 12.0 - 15.9 g/dL    Hematocrit 41.3 34.0 - 46.6 %    MCV 95.2 79.0 - 97.0 fL    MCH 32.9 26.6 - 33.0 pg    MCHC 34.6 31.5 - 35.7 g/dL    RDW 11.8 (L) 12.3 - 15.4 %    RDW-SD 41.3 37.0 - 54.0 fl    MPV 10.0 6.0 - 12.0 fL    Platelets 338 140 - 450 10*3/mm3    Neutrophil % 54.7 42.7 - 76.0 %    Lymphocyte % 28.2 19.6 - 45.3 %    Monocyte % 11.3 5.0 - 12.0 %    Eosinophil % 4.5 0.3 - 6.2 %    Basophil % 0.9 0.0 - 1.5 %    Immature Grans % 0.4 0.0 - 0.5 %    Neutrophils, Absolute 5.90  1.70 - 7.00 10*3/mm3    Lymphocytes, Absolute 3.05 0.70 - 3.10 10*3/mm3    Monocytes, Absolute 1.22 (H) 0.10 - 0.90 10*3/mm3    Eosinophils, Absolute 0.49 (H) 0.00 - 0.40 10*3/mm3    Basophils, Absolute 0.10 0.00 - 0.20 10*3/mm3    Immature Grans, Absolute 0.04 0.00 - 0.05 10*3/mm3    nRBC 0.0 0.0 - 0.2 /100 WBC   Green Top (Gel)    Collection Time: 02/13/23  9:40 PM   Result Value Ref Range    Extra Tube Hold for add-ons.    Lavender Top    Collection Time: 02/13/23  9:40 PM   Result Value Ref Range    Extra Tube hold for add-on    Gold Top - SST    Collection Time: 02/13/23  9:40 PM   Result Value Ref Range    Extra Tube Hold for add-ons.    Gray Top    Collection Time: 02/13/23  9:40 PM   Result Value Ref Range    Extra Tube Hold for add-ons.    Light Blue Top    Collection Time: 02/13/23  9:40 PM   Result Value Ref Range    Extra Tube Hold for add-ons.    Hemoglobin & Hematocrit, Blood    Collection Time: 02/14/23  2:35 AM    Specimen: Blood   Result Value Ref Range    Hemoglobin 14.4 12.0 - 15.9 g/dL    Hematocrit 43.0 34.0 - 46.6 %       If labs were ordered, I independently reviewed the results and considered them in treating the patient.        RADIOLOGY  No Radiology Exams Resulted Within Past 24 Hours    I ordered and independently reviewed the above noted radiographic studies.      I viewed images of CT scan of the abdomen pelvis personally reviewed and discussed over the phone with the radiologist due to delay in the formal reads converting over in the computer.  Agree with their impression that there is a 1 x 1.5 cm hyperechoic area concerning for hematoma or hemorrhage.  I have discussed this with Dr. Millan the on-call gynecologist as well    See radiologist's dictation for official interpretation.        PROCEDURES    Procedures    No orders to display       MEDICATIONS GIVEN IN ER    Medications   sodium chloride 0.9 % flush 10 mL (has no administration in time range)   HYDROcodone-acetaminophen  (NORCO) 5-325 MG per tablet 1 tablet (1 tablet Oral Not Given 2/14/23 9435)   HYDROcodone-acetaminophen (NORCO) 5-325 MG per tablet 1 tablet (1 tablet Oral Given 2/14/23 0402)   iopamidol (ISOVUE-300) 61 % injection 100 mL (89 mL Intravenous Given 2/14/23 0323)   promethazine (PHENERGAN) tablet 25 mg (25 mg Oral Given 2/14/23 0402)   acetaminophen (TYLENOL) tablet 1,000 mg (1,000 mg Oral Given 2/14/23 0745)         MEDICAL DECISION MAKING, PROGRESS, and CONSULTS    All labs have been independently reviewed by me.  All radiology studies have been reviewed by me and the radiologist dictating the report.  All EKG's have been independently viewed and interpreted by me.      Discussion below represents my analysis of pertinent findings related to patient's condition, differential diagnosis, treatment plan and final disposition.      Differential diagnosis:    Hemorrhagic anemia, shock, vaginal cuff dehiscence, hematoma, anemia, electrolyte abnormality      Additional sources:    - Discussed/ obtained information from independent historians: Family at bedside    - External (non-ED) record review:   Recent OB and operative note    - Chronic or social conditions impacting care:    - Shared decision making:        Orders placed during this visit:  Orders Placed This Encounter   Procedures   • CT Abdomen Pelvis With Contrast   • Comprehensive Metabolic Panel   • CBC Auto Differential   • [place] Draw   • Hemoglobin & Hematocrit, Blood   • Type & Screen   • Insert peripheral IV   • CBC & Differential   • Green Top (Gel)   • Lavender Top   • Gold Top - SST   • Gray Top   • Light Blue Top         Additional orders considered but not ordered:  Transvaginal ultrasound at this point I am concerned that this may injure the recent repaired vaginal cuff and CT scan should provide the necessary information.    ED Course:    Consultants:      ED Course as of 02/14/23 0822   Tue Feb 14, 2023 0237 This is a very nice 47-year-old woman  who presents the emergency department for evaluation of persistent vaginal bleeding.  She is about 3 weeks postop total hysterectomy with Dr. Miller.  For the first 2 weeks she was recovering normally but about 5 days ago started having some spotting.  Then today the bleeding started increasing significantly.  She was seen in outside ED yesterday and was discharged after labs were normal.  She denies significant pain but feels like the bleeding is speeding up she has saturated about 10 pads today and about 4 since she has been in the emergency room.  She is scheduled to follow-up with gynecology tomorrow.  She presents here for evaluation.  She has no other complaints at this time. [CC]   0238 She arrived awake and alert vitals are within normal limits.  Initial CBC reassuring with hemoglobin 14.3.  Unfortunately she came on a very busy night and had to wait several hours prior to being seen. [CC]   0239   Because of that a repeat H&H may be some utility so I have sent another 1.  Other labs generally reassuring and nonactionable.  Have discussed with Dr. Millan the on-call obstetrician who recommends bimanual exam I have done this in the cuff feels intact but she is extremely tender and does have some bright red blood on exam.  Patient is on Lovenox because she has history of prior DVT after surgeries.  OB/GYN is sending down the laborist to evaluate the patient as well.  Will reevaluate pending laborist opinion and repeat H&H. [CC]   0819 She is extremely tender on bimanual exam but the vaginal cuff seems to be intact.  Repeat H&H is stable at 14.4. [CC]   0820 Dr. Millan has informed me that the in-house laborist is on their way down to perform a formal speculum exam.  They found a small area of clot and bleeding and cauterized it with silver nitrate sticks.  Recommended CT scan of the abdomen pelvis to evaluate for hematoma.  CT scan of the abdomen pelvis obtained and unfortunately there was a significant delay in  getting the reads from diversified radiology.  Finally the images are still not crossed over but they called me and audibly read the read that shows that there is a 1 x 1.5 cm hyperechoic area at the uterine stump concerning for hemorrhage/hematoma.  I have discussed this with Dr. Millan who has discussed the case with Dr. Miller who is recommending vaginal packing.  Since they are both not in-house right now they are sending down Dr. Gomes to perform vaginal packing.  They still want her to follow-up with Dr. Miller's clinic tomorrow.  The patient has been extremely patient and agreeable through 1 and at least my opinion is an extremely long and frustrating ED stay.  She is agreeable to following up with Dr. Miller.  I thanked her again for her patients.  Counseled on strict return precautions verbally expressed understanding of these. [CC]      ED Course User Index  [CC] Sebastian Martinez MD Dr. Bevan came and evaluated the patient she is declining vaginal packing at this time feeling like the bleeding has slowed significantly after the cautery earlier.  Dr. Gomes is in agreement that this is reasonable.  She will follow-up with Dr. Miller tomorrow.  Counseled on strict return precautions verbally expressed understanding of these.             AS OF 08:22 EST VITALS:    BP - 92/49  HR - 62  TEMP - 98.6 °F (37 °C) (Oral)  O2 SATS - 93%                  DIAGNOSIS  Final diagnoses:   Complication of procedure, initial encounter   Vaginal bleeding   Vaginal pain         DISPOSITION  DISCHARGE    Patient discharged in stable condition.    Reviewed implications of results, diagnosis, meds, responsibility to follow up, warning signs and symptoms of possible worsening, potential complications and reasons to return to ER.    Patient/Family voiced understanding of above instructions.    Discussed plan for discharge, as there is no emergent indication for admission.  Pt/family is agreeable and understands need for follow  up and possible repeat testing.  Pt/family is aware that discharge does not mean that nothing is wrong but that it indicates no emergency is currently present that requires admission and they must continue care with follow-up as given below or with a physician of their choice.     FOLLOW-UP  Angeles Miller MD  [address] [id number] [phone]    Call            Medication List      No changes were made to your prescriptions during this visit.           Please note that portions of this document were completed with voice recognition software.        Sebastain Martinez MD  02/14/23 0817

## 2023-02-14 NOTE — DISCHARGE INSTRUCTIONS
Keep your scheduled follow-up with Dr. Miller.  Continue to monitor symptoms and please return to the ED for any concerning symptoms.  I genuinely appreciate your patience last night.

## 2023-02-14 NOTE — CONSULTS
[facility]  [facility] 1975  MRN: [id number]  CSN: [id number]    Consult Requested By Dr. CHRISTY Martinez, ED Physician  Consulting Service: Gynecology  Reason for Consult/CC: vaginal bleeding after hysterectomy  Date of Consultation: 23       Lula Thomson is a 47 y.o. year old No obstetric history on file. who presented to the ED 4 wk post op from TRH/BSO with complaints of Vaginal bleeding. She is currently on Lovenox 40 mg bid for history of post op DVT in 2016. Had been doing well until 5 days ago when she had some spotting. Was seen at OSH but was dc'd home with normal labs.   Presented here after bleeding increased and she was soaking through pads.   She denies fever, pain, dizziness, increased watery dc or any other sx.     She was seen overnight by in house laborist who documented an intact vaginal cuff, but small area of bleeding that was successfully cauterized with silver nitrate stick.    She reports no further bleeding since that exam at 0350 this am. I am asked to see patient because of possible hematoma initially noted on CT scan earlier this am    Past Medical History:   Diagnosis Date   • Anxiety    • Corneal erosion of right eye    • Degenerative disc disease, cervical    • DVT (deep venous thrombosis) (HCC)     after diagnostic lap   • Elevated cholesterol    • Endometriosis    • GERD (gastroesophageal reflux disease)    • Heart palpitations     HX OF PER PT   • Neck pain    • PONV (postoperative nausea and vomiting)    • Scoliosis     Dx as a child   • Wears contact lenses    • Wears eyeglasses      Past Surgical History:   Procedure Laterality Date   • BASAL CELL CARCINOMA EXCISION  2019   • CHOLECYSTECTOMY     • COLONOSCOPY      polyps removed   • DIAGNOSTIC LAPAROSCOPY  12/2014    x 2 other in    • DIAGNOSTIC LAPAROSCOPY N/A 07/15/2016    Procedure: ROBOTIC EXCISION OF ENDOMETRIOSIS, diagnostic laproscopy, bilateral uretero  lysis;  Surgeon: Angeles Miller MD;  Location:  [facility];  Service:    • ENDOSCOPY      polyps removed from stomach    • TOTAL LAPAROSCOPIC HYSTERECTOMY SALPINGO OOPHORECTOMY N/A 2023    Procedure: TOTAL LAPAROSCOPIC HYSTERECTOMY BILATERAL SALPINGO OOPHORECTOMY WITH DAVINCI ROBOT;  Surgeon: Angeles Miller MD;  Location:  [facility];  Service: Robotics - DaVinci;  Laterality: N/A;     OB History    Para Term  AB Living   0 0 0 0 0 0   SAB IAB Ectopic Molar Multiple Live Births   0 0 0 0 0 0     Social History    Tobacco Use      Smoking status: Former        Packs/day: 0.00        Years: 0.00        Pack years: 0        Types: Cigarettes        Start date: 1995        Quit date: 2012        Years since quittin.0      Smokeless tobacco: Never      Tobacco comments: so glad i was able to quit    No current facility-administered medications for this encounter.    Current Outpatient Medications:   •  acetaminophen (TYLENOL) 325 MG tablet, Take 2 tablets by mouth Every 4 (Four) Hours while awake for 7 days. Then take only As Needed for Mild Pain thereafter., Disp: 100 tablet, Rfl: 0  •  acyclovir (ZOVIRAX) 5 % ointment, Apply  topically Every 4 (Four) Hours While Awake., Disp: 30 g, Rfl: 0  •  albuterol sulfate  (90 Base) MCG/ACT inhaler, FOR INTERMITTENT COUGH, Disp: , Rfl:   •  cetirizine (ZyrTEC) 10 MG tablet, Take 10 mg by mouth daily., Disp: , Rfl:   •  cholecalciferol (VITAMIN D3) 1000 UNITS tablet, Take 1,000 Units by mouth daily., Disp: , Rfl:   •  diclofenac (VOLTAREN) 1 % gel gel, PLEASE SEE ATTACHED FOR DETAILED DIRECTIONS (Patient taking differently: Daily As Needed.), Disp: 100 g, Rfl: 0  •  Enoxaparin Sodium (LOVENOX) 40 MG/0.4ML solution prefilled syringe syringe, Inject 0.4 mL under the skin into the appropriate area as directed Every 12 (Twelve) Hours., Disp: 60 mL, Rfl: 0  •  estradiol (ESTRACE) 1 MG tablet, Take 1 tablet by mouth Daily., Disp: 90 tablet,  "Rfl: 4  •  fluticasone (FLONASE) 50 MCG/ACT nasal spray, 2 sprays by Each Nare route As Needed for Allergies., Disp: , Rfl:   •  meloxicam (MOBIC) 7.5 MG tablet, Take 1 tablet by mouth daily for 7 days. Then take only as needed thereafter., Disp: 20 tablet, Rfl: 0  •  Mucus Relief 600 MG 12 hr tablet, Take 600 mg by mouth Every 12 (Twelve) Hours As Needed for Cough or Congestion., Disp: , Rfl:   •  multivitamin with minerals tablet tablet, Take 1 tablet by mouth Daily., Disp: , Rfl:   •  oxyCODONE (ROXICODONE) 5 MG immediate release tablet, Take 1 tablet by mouth Every 4 (Four) Hours As Needed for Moderate Pain., Disp: 10 tablet, Rfl: 0  •  pantoprazole (PROTONIX) 40 MG EC tablet, Take 40 mg by mouth Daily., Disp: , Rfl:   •  polyethylene glycol (MIRALAX) 17 GM/SCOOP powder, Mix 17 grams in 8 ounces of liquid and take by mouth daily for 2 weeks to prevent constipation, then use as needed. May hold for loose stool, Disp: 238 g, Rfl: 0  •  promethazine (PHENERGAN) 25 MG tablet, Take 1 tablet by mouth Every 6 (Six) Hours As Needed for Nausea or Vomiting., Disp: 20 tablet, Rfl: 0  •  rosuvastatin (CRESTOR) 10 MG tablet, Take 1 tablet by mouth Daily., Disp: 90 tablet, Rfl: 3  •  TiZANidine (ZANAFLEX) 6 MG capsule, Take 1 capsule by mouth 3 (Three) Times a Day., Disp: 270 capsule, Rfl: 1  •  valACYclovir (Valtrex) 1000 MG tablet, Take 2 tablets by mouth 2 (Two) Times a Day., Disp: 4 tablet, Rfl: 5  •  vitamin B-12 (CYANOCOBALAMIN) 1000 MCG tablet, Take 1,000 mcg by mouth daily., Disp: , Rfl:   Allergies   Allergen Reactions   • Ultram [Tramadol] Nausea And Vomiting and Headache       Review of Systems - Negative except HPI       Objective   BP 92/49   Pulse 62   Temp 98.6 °F (37 °C) (Oral)   Resp 18   Ht 165.1 cm (65\")   Wt 79.4 kg (175 lb)   LMP 06/06/2022   SpO2 93%   BMI 29.12 kg/m²   General: well developed; well nourished  no acute distress   Heart: regular rate and rhythm, S1, S2 normal, no murmur, click, " rub or gallop   Lungs: breathing is unlabored   Abdomen: S/NT/ND. Incisions are well healed   Pelvis:: Not performed.Pt declined a third exam       Laboratory Results:  WBC   Date Value Ref Range Status   02/13/2023 10.80 3.40 - 10.80 10*3/mm3 Final     RBC   Date Value Ref Range Status   02/13/2023 4.34 3.77 - 5.28 10*6/mm3 Final     Hemoglobin   Date Value Ref Range Status   02/14/2023 14.4 12.0 - 15.9 g/dL Final     Hematocrit   Date Value Ref Range Status   02/14/2023 43.0 34.0 - 46.6 % Final     MCV   Date Value Ref Range Status   02/13/2023 95.2 79.0 - 97.0 fL Final     MCH   Date Value Ref Range Status   02/13/2023 32.9 26.6 - 33.0 pg Final     MCHC   Date Value Ref Range Status   02/13/2023 34.6 31.5 - 35.7 g/dL Final     RDW   Date Value Ref Range Status   02/13/2023 11.8 (L) 12.3 - 15.4 % Final     RDW-SD   Date Value Ref Range Status   02/13/2023 41.3 37.0 - 54.0 fl Final     MPV   Date Value Ref Range Status   02/13/2023 10.0 6.0 - 12.0 fL Final     Platelets   Date Value Ref Range Status   02/13/2023 338 140 - 450 10*3/mm3 Final     Neutrophil %   Date Value Ref Range Status   02/13/2023 54.7 42.7 - 76.0 % Final     Lymphocyte %   Date Value Ref Range Status   02/13/2023 28.2 19.6 - 45.3 % Final     Monocyte %   Date Value Ref Range Status   02/13/2023 11.3 5.0 - 12.0 % Final     Eosinophil %   Date Value Ref Range Status   02/13/2023 4.5 0.3 - 6.2 % Final     Basophil %   Date Value Ref Range Status   02/13/2023 0.9 0.0 - 1.5 % Final     Immature Grans %   Date Value Ref Range Status   02/13/2023 0.4 0.0 - 0.5 % Final     Neutrophils, Absolute   Date Value Ref Range Status   02/13/2023 5.90 1.70 - 7.00 10*3/mm3 Final     Lymphocytes, Absolute   Date Value Ref Range Status   02/13/2023 3.05 0.70 - 3.10 10*3/mm3 Final     Monocytes, Absolute   Date Value Ref Range Status   02/13/2023 1.22 (H) 0.10 - 0.90 10*3/mm3 Final     Eosinophils, Absolute   Date Value Ref Range Status   02/13/2023 0.49 (H) 0.00 -  0.40 10*3/mm3 Final     Basophils, Absolute   Date Value Ref Range Status   02/13/2023 0.10 0.00 - 0.20 10*3/mm3 Final     Immature Grans, Absolute   Date Value Ref Range Status   02/13/2023 0.04 0.00 - 0.05 10*3/mm3 Final     nRBC   Date Value Ref Range Status   02/13/2023 0.0 0.0 - 0.2 /100 WBC Final     CMP:      Lab 02/13/23  2140   SODIUM 140   POTASSIUM 4.0   CHLORIDE 105   CO2 24.0   ANION GAP 11.0   BUN 17   CREATININE 0.75   EGFR 100.2   GLUCOSE 122*   CALCIUM 9.6   TOTAL PROTEIN 7.4   ALBUMIN 4.4   GLOBULIN 3.0   ALT (SGPT) 53*   AST (SGOT) 36*   BILIRUBIN 0.4   ALK PHOS 81       Imaging Reviewed  CT ABDOMEN PELVIS W CONTRAST     Date of Exam: 2/14/2023 3:20 AM EST     Indication: increasing bleeding 3 weeks post hysterectomy, passing clots, concern for hematoma.     Comparison: 8/3/2016     Technique: Axial CT images were obtained of the abdomen and pelvis following the uneventful intravenous administration of 89 cc Isovue-300. Reconstructed coronal and sagittal images were also obtained. Automated exposure control and iterative   construction methods were used.     Findings:     Lower Chest: Lung bases clear     Organs: Tiny cyst in the posterior right hepatic lobe. Spleen, pancreas, kidneys, adrenal glands unremarkable. Gallbladder surgically absent      Gastrointestinal: No abnormality demonstrated. Normal appendix     Pelvis: Uterus surgically absent. No pelvic hematoma or abnormal fluid collection. Urinary bladder grossly unremarkable     Peritoneum/Retroperitoneum: No hematoma, hemoperitoneum, or pneumoperitoneum. Normal caliber aorta     Bones/Soft Tissues: A few bubbles of gas in the abdominal wall subcutaneous tissues related to surgical procedure. No acute bony abnormality. Degenerative disc disease at L5-S1     IMPRESSION:  No pelvic or abdominal hematoma. No CT findings of complication related to hysterectomy        Electronically Signed: Colt Carreon    2/14/2023 10:12 AM EST    Workstation  ID: [id number]         Assessment   1. Post Operative Patient with VB related to Anticoagulation     Plan   1. Pt had resolution of bleeding after silver nitrate treatment. She reasonably declined a third pelvic exam as her sx are improved. Upon review of CT read, there is not evidence of collected cuff hematoma. Given resolution of sx. Packing is not needed. I did recommend pt rest at home today and avoid strenuous activity.Suspect bleeding is related to Lovenox. Is on a high dose of 40 mg bid for prophylaxis with BMI under 40. Recommended for her to either decrease to 40 mg once daily or ok to stop until after her appointment with Dr. Miller tomorrow as her risk of bleeding is high and her risk of Post op VTE is low at this point post operatively.       My findings from today's consultation along with recommendations and plan of care have been discussed with Dr. CHRISTY Martinez.    Nae Villegas MD  2/14/2023  18:12 EST

## 2023-02-17 ENCOUNTER — TRANSCRIBE ORDERS (OUTPATIENT)
Dept: ADMINISTRATIVE | Facility: HOSPITAL | Age: 48
End: 2023-02-17
Payer: COMMERCIAL

## 2023-02-17 ENCOUNTER — TELEPHONE (OUTPATIENT)
Dept: MRI IMAGING | Facility: HOSPITAL | Age: 48
End: 2023-02-17
Payer: COMMERCIAL

## 2023-02-17 DIAGNOSIS — R92.8 ABNORMAL FINDINGS ON DIAGNOSTIC IMAGING OF BREAST: Primary | ICD-10-CM

## 2023-02-17 NOTE — TELEPHONE ENCOUNTER
Sent office email to order Bilateral Diagnostic Mamm and then we can schedule MRI screening 6 months after to get on rotation. Patient aware of plan

## 2023-04-10 ENCOUNTER — HOSPITAL ENCOUNTER (OUTPATIENT)
Dept: MAMMOGRAPHY | Facility: HOSPITAL | Age: 48
Discharge: HOME | End: 2023-04-10
Admitting: OBSTETRICS & GYNECOLOGY
Payer: COMMERCIAL

## 2023-04-10 DIAGNOSIS — R92.8 ABNORMAL FINDINGS ON DIAGNOSTIC IMAGING OF BREAST: ICD-10-CM

## 2023-04-10 PROCEDURE — 77066 DX MAMMO INCL CAD BI: CPT

## 2023-04-10 PROCEDURE — 77066 DX MAMMO INCL CAD BI: CPT | Performed by: RADIOLOGY

## 2023-04-10 PROCEDURE — 77062 BREAST TOMOSYNTHESIS BI: CPT | Performed by: RADIOLOGY

## 2023-04-10 PROCEDURE — G0279 TOMOSYNTHESIS, MAMMO: HCPCS

## 2023-04-28 ENCOUNTER — LAB (OUTPATIENT)
Dept: LAB | Facility: HOSPITAL | Age: 48
End: 2023-04-28
Payer: COMMERCIAL

## 2023-04-28 ENCOUNTER — OFFICE VISIT (OUTPATIENT)
Dept: INTERNAL MEDICINE | Facility: CLINIC | Age: 48
End: 2023-04-28
Payer: COMMERCIAL

## 2023-04-28 VITALS
HEART RATE: 80 BPM | BODY MASS INDEX: 30.32 KG/M2 | HEIGHT: 65 IN | WEIGHT: 182 LBS | DIASTOLIC BLOOD PRESSURE: 80 MMHG | OXYGEN SATURATION: 98 % | SYSTOLIC BLOOD PRESSURE: 104 MMHG

## 2023-04-28 DIAGNOSIS — G47.00 INSOMNIA, UNSPECIFIED TYPE: ICD-10-CM

## 2023-04-28 DIAGNOSIS — Z90.710 S/P HYSTERECTOMY: ICD-10-CM

## 2023-04-28 DIAGNOSIS — M79.10 MYALGIA: ICD-10-CM

## 2023-04-28 DIAGNOSIS — M79.10 MYALGIA: Primary | ICD-10-CM

## 2023-04-28 PROCEDURE — 99213 OFFICE O/P EST LOW 20 MIN: CPT | Performed by: PHYSICIAN ASSISTANT

## 2023-04-28 PROCEDURE — 82670 ASSAY OF TOTAL ESTRADIOL: CPT

## 2023-04-28 PROCEDURE — 82607 VITAMIN B-12: CPT

## 2023-04-28 PROCEDURE — 86140 C-REACTIVE PROTEIN: CPT

## 2023-04-28 PROCEDURE — 86431 RHEUMATOID FACTOR QUANT: CPT

## 2023-04-28 PROCEDURE — 85652 RBC SED RATE AUTOMATED: CPT

## 2023-04-28 PROCEDURE — 86038 ANTINUCLEAR ANTIBODIES: CPT

## 2023-04-28 PROCEDURE — 82306 VITAMIN D 25 HYDROXY: CPT

## 2023-04-28 PROCEDURE — 83001 ASSAY OF GONADOTROPIN (FSH): CPT

## 2023-04-28 RX ORDER — AMITRIPTYLINE HYDROCHLORIDE 25 MG/1
25 TABLET, FILM COATED ORAL NIGHTLY
Qty: 30 TABLET | Refills: 2 | Status: SHIPPED | OUTPATIENT
Start: 2023-04-28

## 2023-04-28 NOTE — PROGRESS NOTES
Chief Complaint   Patient presents with   • Hormone Imbalance   • Joint Pain   • Insomnia     Acute        Subjective     Rona Thomson is a 47 y.o. female.        History of Present Illness       The patient presents today with complaints of joint pain.    She thought she was going to be able to manage without estrogen after her hysterectmy, but she does not think she is going to. She is in debilitating pain and she is about to lose her mind.     She follows with Dr. Miller, gyn. She went to the emergency room a couple of times. She did not have any bleeding until week 3. She thinks it was a stitch that had come loose. The emergency room  cauterized a stitch. She was still feeling good emotionally. She was feeling the best she has felt in a long time. On week 5, the hot flashes started. She started using  essential oil and that has really helped with the hot flashes. On week 7, she can not move. If she is on the floor, her  will help her up a lot. When she gets out of a car, she sits for a few minutes and gets out. She has been dealing with chronic pain since she was in grade school with her back. This is not what she is used to. She is wondering if it is related to lack of estrogen. Her gynecologist had written the estrogen, but she did not take them. She was told she was better off if she could go without them due to her increased risk of breast cancer. She is already getting MRIs and mammograms every 6 months.      Her pain is in all of her joints. It is bone deep. It hurts in the back of her legs. It is not something that goes away with stretching. She stretches every day. She takes vitamin D, B12, magnesium, and glucosamine. She does not want to keep taking Advil and Aleve because it is going to eat her stomach. Tylenol does not help. She takes an Aleve every morning and at least 2 just about every evening.    She is miserable and not sleeping. She has taken Ambien 15 to 20 years ago. She has tried  Vistaril.     She went to urgent care and they treated her for strep throat 3 times since 12/2022. She is not sleeping. The baby still does not sleep through the night. On the nights that she can go to sleep because her  is getting up with the baby, she can not sleep. She drinks NyQuil every week. She has never taken amitriptyline.          Current Outpatient Medications:   •  acyclovir (ZOVIRAX) 5 % ointment, Apply  topically Every 4 (Four) Hours While Awake., Disp: 30 g, Rfl: 0  •  albuterol sulfate  (90 Base) MCG/ACT inhaler, FOR INTERMITTENT COUGH, Disp: , Rfl:   •  cetirizine (ZyrTEC) 10 MG tablet, Take 1 tablet by mouth Daily., Disp: , Rfl:   •  cholecalciferol (VITAMIN D3) 1000 UNITS tablet, Take 1 tablet by mouth Daily., Disp: , Rfl:   •  diclofenac (VOLTAREN) 1 % gel gel, PLEASE SEE ATTACHED FOR DETAILED DIRECTIONS (Patient taking differently: Daily As Needed.), Disp: 100 g, Rfl: 0  •  Enoxaparin Sodium (LOVENOX) 40 MG/0.4ML solution prefilled syringe syringe, Inject 0.4 mL under the skin into the appropriate area as directed Every 12 (Twelve) Hours., Disp: 60 mL, Rfl: 0  •  estradiol (ESTRACE) 1 MG tablet, Take 1 tablet by mouth Daily., Disp: 90 tablet, Rfl: 4  •  fluticasone (FLONASE) 50 MCG/ACT nasal spray, 2 sprays by Each Nare route As Needed for Allergies., Disp: , Rfl:   •  Mucus Relief 600 MG 12 hr tablet, Take 1 tablet by mouth Every 12 (Twelve) Hours As Needed for Cough or Congestion., Disp: , Rfl:   •  multivitamin with minerals tablet tablet, Take 1 tablet by mouth Daily., Disp: , Rfl:   •  pantoprazole (PROTONIX) 40 MG EC tablet, Take 1 tablet by mouth Daily., Disp: , Rfl:   •  promethazine (PHENERGAN) 25 MG tablet, Take 1 tablet by mouth Every 6 (Six) Hours As Needed for Nausea or Vomiting., Disp: 20 tablet, Rfl: 0  •  rosuvastatin (CRESTOR) 10 MG tablet, Take 1 tablet by mouth Daily., Disp: 90 tablet, Rfl: 3  •  TiZANidine (ZANAFLEX) 6 MG capsule, Take 1 capsule by mouth 3  "(Three) Times a Day., Disp: 270 capsule, Rfl: 1  •  valACYclovir (Valtrex) 1000 MG tablet, Take 2 tablets by mouth 2 (Two) Times a Day., Disp: 4 tablet, Rfl: 5  •  vitamin B-12 (CYANOCOBALAMIN) 1000 MCG tablet, Take 1 tablet by mouth Daily., Disp: , Rfl:   •  amitriptyline (ELAVIL) 25 MG tablet, Take 1 tablet by mouth Every Night., Disp: 30 tablet, Rfl: 2  •  meloxicam (MOBIC) 7.5 MG tablet, Take 1 tablet by mouth daily for 7 days. Then take only as needed thereafter., Disp: 20 tablet, Rfl: 0  •  oxyCODONE (ROXICODONE) 5 MG immediate release tablet, Take 1 tablet by mouth Every 4 (Four) Hours As Needed for Moderate Pain., Disp: 10 tablet, Rfl: 0  •  polyethylene glycol (MIRALAX) 17 GM/SCOOP powder, Mix 17 grams in 8 ounces of liquid and take by mouth daily for 2 weeks to prevent constipation, then use as needed. May hold for loose stool, Disp: 238 g, Rfl: 0     PMFSH  The following portions of the patient's history were reviewed and updated as appropriate: allergies, current medications, past family history, past medical history, past social history, past surgical history and problem list.    Review of Systems   Constitutional: Negative for fever and unexpected weight change.   HENT: Negative.    Eyes: Negative.    Respiratory: Negative for chest tightness, shortness of breath and wheezing.    Cardiovascular: Negative.    Gastrointestinal: Negative for abdominal pain.   Endocrine: Negative.    Genitourinary: Negative.    Musculoskeletal: Positive for arthralgias and myalgias.   Skin: Negative for color change, rash and wound.   Allergic/Immunologic: Negative.    Neurological: Negative for seizures, syncope and numbness.   Hematological: Negative for adenopathy. Does not bruise/bleed easily.   Psychiatric/Behavioral: Negative for confusion.       Objective   /80   Pulse 80   Ht 165.1 cm (65\")   Wt 82.6 kg (182 lb)   LMP 06/06/2022   SpO2 98%   BMI 30.29 kg/m²     Physical Exam  Vitals and nursing note " reviewed.   Constitutional:       Appearance: She is well-developed.   HENT:      Head: Normocephalic and atraumatic.      Right Ear: External ear normal.      Left Ear: External ear normal.   Eyes:      Conjunctiva/sclera: Conjunctivae normal.   Cardiovascular:      Rate and Rhythm: Normal rate and regular rhythm.   Pulmonary:      Effort: Pulmonary effort is normal.      Breath sounds: Normal breath sounds.   Musculoskeletal:         General: Normal range of motion.      Cervical back: Normal range of motion.   Skin:     General: Skin is warm and dry.   Psychiatric:         Behavior: Behavior normal.              ASSESSMENT/PLAN    Diagnoses and all orders for this visit:    1. Myalgia (Primary)  Assessment & Plan:  Start amitriptyline for pain and sleep. Pt will discuss estrogen replacement with gynecologist.    Orders:  -     amitriptyline (ELAVIL) 25 MG tablet; Take 1 tablet by mouth Every Night.  Dispense: 30 tablet; Refill: 2  -     Cancel: ANCA Panel; Future  -     Sedimentation Rate; Future  -     Rheumatoid Factor; Future  -     C-reactive Protein; Future  -     Vitamin D,25-Hydroxy; Future  -     Vitamin B12; Future  -     Antinuclear Antigen Antibody, IFA; Future    2. Insomnia, unspecified type  -     amitriptyline (ELAVIL) 25 MG tablet; Take 1 tablet by mouth Every Night.  Dispense: 30 tablet; Refill: 2    3. S/P hysterectomy  -     Estradiol; Future  -     Follicle Stimulating Hormone; Future           Return in about 4 weeks (around 5/26/2023) for Follow up.  Answers for HPI/ROS submitted by the patient on 4/26/2023  Please describe your symptoms.: All over deep debilitating body pain  Have you had these symptoms before?: No  How long have you been having these symptoms?: Greater than 2 weeks  Please list any medications you are currently taking for this condition.: OTC Advil/Tylenol  Please describe any probable cause for these symptoms. : Hysterectomy possibly  What is the primary reason for your  visit?: Other    Transcribed from ambient dictation for FAROOQ Berrios by Kala Rogers.  04/28/23   11:58 EDT    Patient or patient representative verbalized consent to the visit recording.

## 2023-04-29 LAB
25(OH)D3 SERPL-MCNC: 64.4 NG/ML (ref 30–100)
CHROMATIN AB SERPL-ACNC: <10 IU/ML (ref 0–14)
CRP SERPL-MCNC: <0.3 MG/DL (ref 0–0.5)
ERYTHROCYTE [SEDIMENTATION RATE] IN BLOOD: 21 MM/HR (ref 0–20)
ESTRADIOL SERPL HS-MCNC: <5 PG/ML
FSH SERPL-ACNC: 91.2 MIU/ML
VIT B12 BLD-MCNC: 834 PG/ML (ref 211–946)

## 2023-05-01 LAB — ANA SER QL IF: NEGATIVE

## 2023-05-10 DIAGNOSIS — E78.01 FAMILIAL HYPERCHOLESTEROLEMIA: ICD-10-CM

## 2023-05-10 RX ORDER — ROSUVASTATIN CALCIUM 10 MG/1
TABLET, COATED ORAL
Qty: 90 TABLET | Refills: 0 | Status: SHIPPED | OUTPATIENT
Start: 2023-05-10

## 2023-07-20 ENCOUNTER — LAB (OUTPATIENT)
Dept: LAB | Facility: HOSPITAL | Age: 48
End: 2023-07-20
Payer: COMMERCIAL

## 2023-07-20 DIAGNOSIS — E55.9 VITAMIN D DEFICIENCY: ICD-10-CM

## 2023-07-20 DIAGNOSIS — R53.83 FATIGUE, UNSPECIFIED TYPE: ICD-10-CM

## 2023-07-20 DIAGNOSIS — G47.00 INSOMNIA, UNSPECIFIED TYPE: ICD-10-CM

## 2023-07-20 DIAGNOSIS — Z00.00 HEALTH CARE MAINTENANCE: ICD-10-CM

## 2023-07-20 PROCEDURE — 80050 GENERAL HEALTH PANEL: CPT

## 2023-07-20 PROCEDURE — 82306 VITAMIN D 25 HYDROXY: CPT

## 2023-07-20 PROCEDURE — 83735 ASSAY OF MAGNESIUM: CPT

## 2023-07-20 PROCEDURE — 80061 LIPID PANEL: CPT

## 2023-07-20 PROCEDURE — 82607 VITAMIN B-12: CPT

## 2023-07-21 LAB
25(OH)D3 SERPL-MCNC: 56.5 NG/ML (ref 30–100)
ALBUMIN SERPL-MCNC: 4.6 G/DL (ref 3.5–5.2)
ALBUMIN/GLOB SERPL: 1.6 G/DL
ALP SERPL-CCNC: 77 U/L (ref 39–117)
ALT SERPL W P-5'-P-CCNC: 20 U/L (ref 1–33)
ANION GAP SERPL CALCULATED.3IONS-SCNC: 10.8 MMOL/L (ref 5–15)
AST SERPL-CCNC: 24 U/L (ref 1–32)
BASOPHILS # BLD AUTO: 0.1 10*3/MM3 (ref 0–0.2)
BASOPHILS NFR BLD AUTO: 1.1 % (ref 0–1.5)
BILIRUB SERPL-MCNC: 0.7 MG/DL (ref 0–1.2)
BUN SERPL-MCNC: 7 MG/DL (ref 6–20)
BUN/CREAT SERPL: 6.9 (ref 7–25)
CALCIUM SPEC-SCNC: 9.8 MG/DL (ref 8.6–10.5)
CHLORIDE SERPL-SCNC: 105 MMOL/L (ref 98–107)
CHOLEST SERPL-MCNC: 236 MG/DL (ref 0–200)
CO2 SERPL-SCNC: 28.2 MMOL/L (ref 22–29)
CREAT SERPL-MCNC: 1.01 MG/DL (ref 0.57–1)
DEPRECATED RDW RBC AUTO: 43.8 FL (ref 37–54)
EGFRCR SERPLBLD CKD-EPI 2021: 69.7 ML/MIN/1.73
EOSINOPHIL # BLD AUTO: 0.2 10*3/MM3 (ref 0–0.4)
EOSINOPHIL NFR BLD AUTO: 2.2 % (ref 0.3–6.2)
ERYTHROCYTE [DISTWIDTH] IN BLOOD BY AUTOMATED COUNT: 12.6 % (ref 12.3–15.4)
GLOBULIN UR ELPH-MCNC: 2.9 GM/DL
GLUCOSE SERPL-MCNC: 92 MG/DL (ref 65–99)
HCT VFR BLD AUTO: 41.6 % (ref 34–46.6)
HDLC SERPL-MCNC: 45 MG/DL (ref 40–60)
HGB BLD-MCNC: 14.3 G/DL (ref 12–15.9)
IMM GRANULOCYTES # BLD AUTO: 0.02 10*3/MM3 (ref 0–0.05)
IMM GRANULOCYTES NFR BLD AUTO: 0.2 % (ref 0–0.5)
LDLC SERPL CALC-MCNC: 152 MG/DL (ref 0–100)
LDLC/HDLC SERPL: 3.29 {RATIO}
LYMPHOCYTES # BLD AUTO: 2.6 10*3/MM3 (ref 0.7–3.1)
LYMPHOCYTES NFR BLD AUTO: 28 % (ref 19.6–45.3)
MAGNESIUM SERPL-MCNC: 2.3 MG/DL (ref 1.6–2.6)
MCH RBC QN AUTO: 32.6 PG (ref 26.6–33)
MCHC RBC AUTO-ENTMCNC: 34.4 G/DL (ref 31.5–35.7)
MCV RBC AUTO: 95 FL (ref 79–97)
MONOCYTES # BLD AUTO: 0.78 10*3/MM3 (ref 0.1–0.9)
MONOCYTES NFR BLD AUTO: 8.4 % (ref 5–12)
NEUTROPHILS NFR BLD AUTO: 5.57 10*3/MM3 (ref 1.7–7)
NEUTROPHILS NFR BLD AUTO: 60.1 % (ref 42.7–76)
NRBC BLD AUTO-RTO: 0 /100 WBC (ref 0–0.2)
PLATELET # BLD AUTO: 285 10*3/MM3 (ref 140–450)
PMV BLD AUTO: 12.1 FL (ref 6–12)
POTASSIUM SERPL-SCNC: 3.9 MMOL/L (ref 3.5–5.2)
PROT SERPL-MCNC: 7.5 G/DL (ref 6–8.5)
RBC # BLD AUTO: 4.38 10*6/MM3 (ref 3.77–5.28)
SODIUM SERPL-SCNC: 144 MMOL/L (ref 136–145)
TRIGL SERPL-MCNC: 215 MG/DL (ref 0–150)
TSH SERPL DL<=0.05 MIU/L-ACNC: 0.87 UIU/ML (ref 0.27–4.2)
VIT B12 BLD-MCNC: 1180 PG/ML (ref 211–946)
VLDLC SERPL-MCNC: 39 MG/DL (ref 5–40)
WBC NRBC COR # BLD: 9.27 10*3/MM3 (ref 3.4–10.8)

## 2023-07-23 NOTE — PROGRESS NOTES
Your labs show that your vitamin B12 is a little higher than the normal range. This may not be causing any symptoms but you could try reducing your dose to see if it makes a difference in how you feel.    Your creatinine (kidney function) was a little elevated, likely because you were mildly dehydrated from fasting for the labs. Make sure you are drinking plenty of water.    Your cholesterol remains elevated. Please make sure you are taking your cholesterol medicine every day. It works best if you take it at night. Keep working on maintaining a healthy diet with regular exercise. I would recommend 150 minutes of moderate physical activity a week and increasing fresh fruits and vegetables, whole grains, and fish and decreasing processed foods and red meat.    Everything else looks fine.

## 2023-08-08 ENCOUNTER — OFFICE VISIT (OUTPATIENT)
Dept: NEUROSURGERY | Facility: CLINIC | Age: 48
End: 2023-08-08
Payer: COMMERCIAL

## 2023-08-08 VITALS
SYSTOLIC BLOOD PRESSURE: 118 MMHG | WEIGHT: 184.6 LBS | DIASTOLIC BLOOD PRESSURE: 76 MMHG | BODY MASS INDEX: 31.51 KG/M2 | HEIGHT: 64 IN | TEMPERATURE: 97.1 F

## 2023-08-08 DIAGNOSIS — R20.0 BILATERAL HAND NUMBNESS: Primary | ICD-10-CM

## 2023-08-08 PROCEDURE — 99204 OFFICE O/P NEW MOD 45 MIN: CPT | Performed by: PHYSICIAN ASSISTANT

## 2023-08-08 NOTE — LETTER
2023       No Recipients    Patient: Rona Thomson   YOB: 1975   Date of Visit: 2023       Dear   No Recipients,    Thank you for referring Rona Thomson to me for evaluation. Below is a copy of my consult note.    If you have questions, please do not hesitate to call me. I look forward to following Rona along with you.         Sincerely,        Tee Tirado PA-C        CC:   No Recipients    Patient: Rona Thomson  : 1975    Primary Care Provider: Esthela Gilmore PA      Chief Complaint: ***    History of Present Illness:       ***    Review of Systems   Constitutional:  Positive for fatigue. Negative for activity change, appetite change, chills, diaphoresis, fever and unexpected weight change.   HENT:  Negative for congestion, dental problem, drooling, ear discharge, ear pain, facial swelling, hearing loss, mouth sores, nosebleeds, postnasal drip, rhinorrhea, sinus pressure, sinus pain, sneezing, sore throat, tinnitus, trouble swallowing and voice change.    Eyes:  Negative for photophobia, pain, discharge, redness, itching and visual disturbance.   Respiratory:  Negative for apnea, cough, choking, chest tightness, shortness of breath, wheezing and stridor.    Cardiovascular:  Negative for chest pain, palpitations and leg swelling.   Gastrointestinal:  Positive for abdominal pain, anal bleeding, constipation, diarrhea and nausea. Negative for abdominal distention, blood in stool, rectal pain and vomiting.   Endocrine: Positive for cold intolerance and heat intolerance. Negative for polydipsia, polyphagia and polyuria.   Genitourinary:  Negative for decreased urine volume, difficulty urinating, dyspareunia, dysuria, enuresis, flank pain, frequency, genital sores, hematuria, menstrual problem, pelvic pain, urgency, vaginal bleeding, vaginal discharge and vaginal pain.   Musculoskeletal:  Positive for arthralgias, back pain, joint swelling, myalgias, neck pain  and neck stiffness. Negative for gait problem.   Skin:  Negative for color change, pallor, rash and wound.   Allergic/Immunologic: Positive for environmental allergies. Negative for food allergies and immunocompromised state.   Neurological:  Positive for weakness, numbness and headaches. Negative for dizziness, tremors, seizures, syncope, facial asymmetry, speech difficulty and light-headedness.   Hematological:  Negative for adenopathy. Does not bruise/bleed easily.   Psychiatric/Behavioral:  Positive for sleep disturbance. Negative for agitation, behavioral problems, confusion, decreased concentration, dysphoric mood, hallucinations, self-injury and suicidal ideas. The patient is not nervous/anxious and is not hyperactive.    All other systems reviewed and are negative.    Past Medical History:     Past Medical History:   Diagnosis Date    Anxiety     Arthritis of back     Corneal erosion of right eye     Degenerative disc disease, cervical     DVT (deep venous thrombosis) 2016    after diagnostic lap    Elevated cholesterol     Endometriosis     Fracture of wrist 2003    Small bone in right hand    Fracture, foot 1991    Right foot    GERD (gastroesophageal reflux disease)     Heart palpitations     HX OF PER PT    Low back strain     Neck pain     Neck strain     PONV (postoperative nausea and vomiting)     Scoliosis 1988    Dx as a child    Tear of meniscus of knee Torn maniscus    Kicked m by horse- Right leg - just below knee    Wears contact lenses     Wears eyeglasses        Family History:     Family History   Problem Relation Age of Onset    Cancer Father         Colon CA / DX 2013    Colon cancer Father     Hypertension Father     Colon cancer Maternal Grandmother     Breast cancer Maternal Aunt 30    Breast cancer Cousin 20    Breast cancer Mother 63    Cancer Mother         Breast CA / DX April 2019    Ovarian cancer Neg Hx        Social History:    reports that she quit  "smoking about 11 years ago. Her smoking use included cigarettes. She started smoking about 28 years ago. She has never used smokeless tobacco. She reports that she does not drink alcohol and does not use drugs.   SMOKING STATUS: ***    Surgical History:     Past Surgical History:   Procedure Laterality Date    BASAL CELL CARCINOMA EXCISION  11/21/2019    BREAST BIOPSY Right 03/30/2022    CHOLECYSTECTOMY  2014    COLONOSCOPY  2014    polyps removed    DIAGNOSTIC LAPAROSCOPY  12/2014    x 2 other in 2005    DIAGNOSTIC LAPAROSCOPY N/A 07/15/2016    Procedure: ROBOTIC EXCISION OF ENDOMETRIOSIS, diagnostic laproscopy, bilateral uretero lysis;  Surgeon: Angeles Miller MD;  Location:  [facility];  Service:     ENDOSCOPY  2014    polyps removed from stomach     TOTAL LAPAROSCOPIC HYSTERECTOMY SALPINGO OOPHORECTOMY N/A 01/19/2023    Procedure: TOTAL LAPAROSCOPIC HYSTERECTOMY BILATERAL SALPINGO OOPHORECTOMY WITH DAVINCI ROBOT;  Surgeon: Angeles Miller MD;  Location:  [facility];  Service: Robotics - DaVinci;  Laterality: N/A;    TRIGGER POINT INJECTION         Allergies:   Ultram [tramadol]    Physical Exam:    Vital Signs:/76 (BP Location: Right arm, Patient Position: Sitting, Cuff Size: Adult)   Temp 97.1 øF (36.2 øC) (Infrared)   Ht 162.6 cm (64\")   Wt 83.7 kg (184 lb 9.6 oz)   LMP 06/06/2022   BMI 31.69 kg/mý    BMI: Body mass index is 31.69 kg/mý.         ***    Medical Decision Making    Data Review:   ***    Diagnosis:   ***    Treatment Options:   ***         No diagnosis found.    "

## 2023-08-08 NOTE — PROGRESS NOTES
"Patient: Rona Thomson  : 1975    Primary Care Provider: Esthela Gilmore PA      Chief Complaint: Bilateral hand numbness    History of Present Illness:       Patient is a nice 48-year-old female who has had about 2-month history of hand numbness.  Initially started in the right hand has progressed to both hands.  Patient is seeing Dr. Donovan for carpal tunnel syndrome but secondary to some pain up in her neck which she is being treated for by chiropractic he ordered an MRI that showed some degenerative changes throughout the mid cervical spine.  Unfortunately those images for our review today.    Patient has been wearing wrist splints on both hands at night unfortunately this continues to give her issues especially when she is typing at work.  They do wake her up at night and the EMG shows bilateral carpal tunnel syndrome worse on right than left with some demyelination on the right.  Dr. Donovan asked for our opinion on the neck.    Patient denies any signs or symptoms of progressive cervical myelopathy or weakness in her upper extremities.    Given I do not have her MRI I do think that she would be a good candidate for carpal Tunnel decompression and patient understands that there may be a \"fleas and ticks \"situation where some of her pains can be coming from her neck.    I also recommended she see a psychologist secondary to very stressful life currently.  Recent hysterectomy and has been on fertile.  Adopting a foster child etc.    Review of Systems   Constitutional:  Positive for fatigue. Negative for activity change, appetite change, chills, diaphoresis, fever and unexpected weight change.   HENT:  Negative for congestion, dental problem, drooling, ear discharge, ear pain, facial swelling, hearing loss, mouth sores, nosebleeds, postnasal drip, rhinorrhea, sinus pressure, sinus pain, sneezing, sore throat, tinnitus, trouble swallowing and voice change.    Eyes:  Negative for photophobia, pain, " discharge, redness, itching and visual disturbance.   Respiratory:  Negative for apnea, cough, choking, chest tightness, shortness of breath, wheezing and stridor.    Cardiovascular:  Negative for chest pain, palpitations and leg swelling.   Gastrointestinal:  Positive for abdominal pain, anal bleeding, constipation, diarrhea and nausea. Negative for abdominal distention, blood in stool, rectal pain and vomiting.   Endocrine: Positive for cold intolerance and heat intolerance. Negative for polydipsia, polyphagia and polyuria.   Genitourinary:  Negative for decreased urine volume, difficulty urinating, dyspareunia, dysuria, enuresis, flank pain, frequency, genital sores, hematuria, menstrual problem, pelvic pain, urgency, vaginal bleeding, vaginal discharge and vaginal pain.   Musculoskeletal:  Positive for arthralgias, back pain, joint swelling, myalgias, neck pain and neck stiffness. Negative for gait problem.   Skin:  Negative for color change, pallor, rash and wound.   Allergic/Immunologic: Positive for environmental allergies. Negative for food allergies and immunocompromised state.   Neurological:  Positive for weakness, numbness and headaches. Negative for dizziness, tremors, seizures, syncope, facial asymmetry, speech difficulty and light-headedness.   Hematological:  Negative for adenopathy. Does not bruise/bleed easily.   Psychiatric/Behavioral:  Positive for sleep disturbance. Negative for agitation, behavioral problems, confusion, decreased concentration, dysphoric mood, hallucinations, self-injury and suicidal ideas. The patient is not nervous/anxious and is not hyperactive.    All other systems reviewed and are negative.    Past Medical History:     Past Medical History:   Diagnosis Date    Anxiety     Arthritis of back     Corneal erosion of right eye     Degenerative disc disease, cervical     DVT (deep venous thrombosis) 2016    after diagnostic lap    Elevated cholesterol     Endometriosis      Fracture of wrist 2003    Small bone in right hand    Fracture, foot 1991    Right foot    GERD (gastroesophageal reflux disease)     Heart palpitations     HX OF PER PT    Low back strain     Neck pain     Neck strain     PONV (postoperative nausea and vomiting)     Scoliosis 1988    Dx as a child    Tear of meniscus of knee Torn cristel    Kicked m by horse- Right leg - just below knee    Wears contact lenses     Wears eyeglasses        Family History:     Family History   Problem Relation Age of Onset    Cancer Father         Colon CA / DX 2013    Colon cancer Father     Hypertension Father     Colon cancer Maternal Grandmother     Breast cancer Maternal Aunt 30    Breast cancer Cousin 20    Breast cancer Mother 63    Cancer Mother         Breast CA / DX April 2019    Ovarian cancer Neg Hx        Social History:    reports that she quit smoking about 11 years ago. Her smoking use included cigarettes. She started smoking about 28 years ago. She has never used smokeless tobacco. She reports that she does not drink alcohol and does not use drugs.   SMOKING STATUS: Non-smoker    Surgical History:     Past Surgical History:   Procedure Laterality Date    BASAL CELL CARCINOMA EXCISION  11/21/2019    BREAST BIOPSY Right 03/30/2022    CHOLECYSTECTOMY  2014    COLONOSCOPY  2014    polyps removed    DIAGNOSTIC LAPAROSCOPY  12/2014    x 2 other in 2005    DIAGNOSTIC LAPAROSCOPY N/A 07/15/2016    Procedure: ROBOTIC EXCISION OF ENDOMETRIOSIS, diagnostic laproscopy, bilateral uretero lysis;  Surgeon: Angeles Miller MD;  Location:  [facility];  Service:     ENDOSCOPY  2014    polyps removed from stomach     TOTAL LAPAROSCOPIC HYSTERECTOMY SALPINGO OOPHORECTOMY N/A 01/19/2023    Procedure: TOTAL LAPAROSCOPIC HYSTERECTOMY BILATERAL SALPINGO OOPHORECTOMY WITH DAVINCI ROBOT;  Surgeon: Angeles Miller MD;  Location:  [facility];  Service: Robotics - DaVinci;  Laterality: N/A;    TRIGGER POINT INJECTION         Allergies:   Ultram  "[tramadol]    Physical Exam:    Vital Signs:/76 (BP Location: Right arm, Patient Position: Sitting, Cuff Size: Adult)   Temp 97.1 øF (36.2 øC) (Infrared)   Ht 162.6 cm (64\")   Wt 83.7 kg (184 lb 9.6 oz)   LMP 06/06/2022   BMI 31.69 kg/mý    BMI: Body mass index is 31.69 kg/mý.     GENERAL:           The patient is in no acute distress, and is able to answer all questions appropriately.    Neck:          Supple without lymphadenopathy    Cardiovascular:       Peripheral pulses 2+ at dorsalis pedis and posterior tibialis    Lungs:         Breathing unlabored    Musculoskeletal:            strength is 5 out of 5 bilaterally.        Shoulder abduction is 5 out of 5.         Dorsiflexion is 5/5 Bilaterally       Plantarflexion is 5/5 bilaterally       Hip Flexion 5/5 bilaterally.         The patient's gait is normal without antalgia.    Neurologic:          The patient is alert and oriented by 3.          Pupils are equal and reactive to light.         Visual fields are full.         Extraocular movements are intact without nystagmus.         There is no evidence of central motor drift. No facial droop.  No difficulty with rapid alternating movements.         Sensation is equal bilaterally with no deficit.           Reflexes:  3+ through out  No Salazar's no clonus no long track signs    CRANIAL NERVES:         Cranial nerve II: Visual fields are full to confrontation.       Cranial nerves III, IV and VI: PERRLA DC.  Extraocular movements are intact.  Nystagmus is not present.       Cranial nerve V: Facial sensation is intact to light touch.       Cranial nerve VII: Muscles of facial expression revealed no asymmetry.       Cranial nerve VIII: Hearing is intact to finger rub bilaterally.       Cranial nerve IX and X: Palate elevates symmetrically.        Cranial nerve XI: Shoulder shrug is intact.       Cranial nerve XII: Tongue is midline without evidence of Atrophy or fasciculation.        Medical " Decision Making    Data Review:   EMG nerve conduction test reviewed showing some demyelinization of the right median nerve.  Also some slowing of the latencies in bilateral hands on nerve test    Diagnosis:   Bilateral carpal tunnel syndrome  Apical degenerative disc disease    Treatment Options:   I think it is reasonable for Dr. Donovan to proceed with a carpal tunnel release.  If this fails to get her where she would like to go I think it is reasonable to consider cervical injections.  I will await her MRI and if the plans change I will call the patient and addend my note.    Its been a pleasure providing neurosurgical care    No diagnosis found.

## 2023-08-09 ENCOUNTER — LAB (OUTPATIENT)
Dept: LAB | Facility: HOSPITAL | Age: 48
End: 2023-08-09
Payer: COMMERCIAL

## 2023-08-09 ENCOUNTER — OFFICE VISIT (OUTPATIENT)
Dept: INTERNAL MEDICINE | Facility: CLINIC | Age: 48
End: 2023-08-09
Payer: COMMERCIAL

## 2023-08-09 VITALS
OXYGEN SATURATION: 98 % | BODY MASS INDEX: 31.41 KG/M2 | SYSTOLIC BLOOD PRESSURE: 120 MMHG | DIASTOLIC BLOOD PRESSURE: 92 MMHG | WEIGHT: 184 LBS | HEIGHT: 64 IN | HEART RATE: 98 BPM

## 2023-08-09 DIAGNOSIS — Z12.11 COLON CANCER SCREENING: ICD-10-CM

## 2023-08-09 DIAGNOSIS — R19.7 DIARRHEA, UNSPECIFIED TYPE: ICD-10-CM

## 2023-08-09 DIAGNOSIS — R19.7 DIARRHEA, UNSPECIFIED TYPE: Primary | ICD-10-CM

## 2023-08-09 LAB
ALBUMIN SERPL-MCNC: 4.5 G/DL (ref 3.5–5.2)
ALBUMIN/GLOB SERPL: 1.8 G/DL
ALP SERPL-CCNC: 74 U/L (ref 39–117)
ALT SERPL W P-5'-P-CCNC: 19 U/L (ref 1–33)
ANION GAP SERPL CALCULATED.3IONS-SCNC: 10 MMOL/L (ref 5–15)
AST SERPL-CCNC: 22 U/L (ref 1–32)
BASOPHILS # BLD AUTO: 0.06 10*3/MM3 (ref 0–0.2)
BASOPHILS NFR BLD AUTO: 0.9 % (ref 0–1.5)
BILIRUB SERPL-MCNC: 0.6 MG/DL (ref 0–1.2)
BUN SERPL-MCNC: 12 MG/DL (ref 6–20)
BUN/CREAT SERPL: 12.9 (ref 7–25)
CALCIUM SPEC-SCNC: 9.9 MG/DL (ref 8.6–10.5)
CHLORIDE SERPL-SCNC: 103 MMOL/L (ref 98–107)
CO2 SERPL-SCNC: 29 MMOL/L (ref 22–29)
CREAT SERPL-MCNC: 0.93 MG/DL (ref 0.57–1)
DEPRECATED RDW RBC AUTO: 42.4 FL (ref 37–54)
EGFRCR SERPLBLD CKD-EPI 2021: 76.9 ML/MIN/1.73
EOSINOPHIL # BLD AUTO: 0.1 10*3/MM3 (ref 0–0.4)
EOSINOPHIL NFR BLD AUTO: 1.6 % (ref 0.3–6.2)
ERYTHROCYTE [DISTWIDTH] IN BLOOD BY AUTOMATED COUNT: 12.4 % (ref 12.3–15.4)
GLOBULIN UR ELPH-MCNC: 2.5 GM/DL
GLUCOSE SERPL-MCNC: 87 MG/DL (ref 65–99)
HCT VFR BLD AUTO: 40.3 % (ref 34–46.6)
HGB BLD-MCNC: 13.8 G/DL (ref 12–15.9)
IMM GRANULOCYTES # BLD AUTO: 0.02 10*3/MM3 (ref 0–0.05)
IMM GRANULOCYTES NFR BLD AUTO: 0.3 % (ref 0–0.5)
LYMPHOCYTES # BLD AUTO: 2.1 10*3/MM3 (ref 0.7–3.1)
LYMPHOCYTES NFR BLD AUTO: 33.1 % (ref 19.6–45.3)
MCH RBC QN AUTO: 31.9 PG (ref 26.6–33)
MCHC RBC AUTO-ENTMCNC: 34.2 G/DL (ref 31.5–35.7)
MCV RBC AUTO: 93.3 FL (ref 79–97)
MONOCYTES # BLD AUTO: 0.69 10*3/MM3 (ref 0.1–0.9)
MONOCYTES NFR BLD AUTO: 10.9 % (ref 5–12)
NEUTROPHILS NFR BLD AUTO: 3.38 10*3/MM3 (ref 1.7–7)
NEUTROPHILS NFR BLD AUTO: 53.2 % (ref 42.7–76)
NRBC BLD AUTO-RTO: 0 /100 WBC (ref 0–0.2)
PLATELET # BLD AUTO: 302 10*3/MM3 (ref 140–450)
PMV BLD AUTO: 11.3 FL (ref 6–12)
POTASSIUM SERPL-SCNC: 4.3 MMOL/L (ref 3.5–5.2)
PROT SERPL-MCNC: 7 G/DL (ref 6–8.5)
RBC # BLD AUTO: 4.32 10*6/MM3 (ref 3.77–5.28)
SODIUM SERPL-SCNC: 142 MMOL/L (ref 136–145)
WBC NRBC COR # BLD: 6.35 10*3/MM3 (ref 3.4–10.8)

## 2023-08-09 PROCEDURE — 85025 COMPLETE CBC W/AUTO DIFF WBC: CPT

## 2023-08-09 PROCEDURE — 99213 OFFICE O/P EST LOW 20 MIN: CPT | Performed by: PHYSICIAN ASSISTANT

## 2023-08-09 PROCEDURE — 80053 COMPREHEN METABOLIC PANEL: CPT

## 2023-08-09 NOTE — PROGRESS NOTES
"Chief Complaint   Patient presents with    Nausea    Diarrhea    Black or Bloody Stool     Acute        Subjective     Rona Thomson is a 48 y.o. female.        History of Present Illness     Ms. Rona Thomson is a 48-year-old female who presents today for a follow-up.    She is feeling better than she did the last time she was here. She had a migraine that lasted 1 hour and 45 minutes before she felt relief from the injection she received.     She has been experiencing diarrhea for over 2 weeks. She has been experiencing cramping and constant nausea. She had some Phenergan left over from her hysterectomy surgery. She took Imodium, but she has not taken any more since the other day. By that night, she was backed up so bad that she thought \"she was going to die on the commode\". She has already had a bowel movement this morning since she got here. She thought she had a stomach virus, but it became worse. She had strep throat in 12/2022 and 01/2023 right before her hysterectomy, which was the last time she was prescribed an antibiotic. Her stool is very loose and not formed. She is unsure if she has an internal hemorrhoid that is bleeding or an external hemorrhoid that is bleeding. Her stool is black and tarry looking at times. It is not sticky. She has not tried any Pepto-Bismol. She has not had a colonoscopy since 12/2018. She already has colon polyps. She would like to switch to a [facility] provider for colorectal specialist. She is having a bowel movement 3 to 4 times a day currently. She is taking a probiotic every day. She takes Aleve, turmeric, magnesium, glucosamine, probiotic, Protonix, and multivitamin.     She mentions that she is going to have to try some estrogen for her pain that she is currently experiencing. She called and left a message for the nurse for Dr. Miller. The last time she was there, they opted for the Cymbalta because it would be less risky for her. She is at the point now that the " "Cymbalta was increased to 60 mg since her last visit, but it has not made any difference in her pain. She does not want to live on pills the rest of her life in order to 'function.\" She has her adoption on Tuesday, 08/16/2023. She is getting a new baby in 12/2023 or 01/2024. Dr. Miller was going to do a chemical panel because estrogen will increase her blood clot risk and she notes that she is getting desperate enough to try it. She has deep joint pain. When she gets up in the morning, her knuckles hurt like crazy to bend, it is painful to step on her feet, bend her knees, and getting out of the car. She has always had chronic pain as her back has hurt her whole life.  She confirms that her vertebrae \"are jacked up\" and she has severe degenerative disc disease. She has a high tolerance for pain and has lived her life without depending on pain pills and just used Aleve. She is starting to regret her hysterectomy. She does not think it has anything to do with all her gastrointestinal issues. She was told that she needed to talk to a psychologist about stress level.     She had a hysterectomy in 01/2023.      Current Outpatient Medications:     acyclovir (ZOVIRAX) 5 % ointment, Apply  topically Every 4 (Four) Hours While Awake., Disp: 30 g, Rfl: 0    cetirizine (ZyrTEC) 10 MG tablet, Take 1 tablet by mouth Daily., Disp: , Rfl:     cholecalciferol (VITAMIN D3) 1000 UNITS tablet, Take 1 tablet by mouth Daily., Disp: , Rfl:     diclofenac (VOLTAREN) 1 % gel gel, PLEASE SEE ATTACHED FOR DETAILED DIRECTIONS (Patient taking differently: Daily As Needed.), Disp: 100 g, Rfl: 0    DULoxetine (CYMBALTA) 60 MG capsule, Take 1 capsule by mouth Daily., Disp: 30 capsule, Rfl: 3    fluticasone (FLONASE) 50 MCG/ACT nasal spray, 2 sprays by Each Nare route As Needed for Allergies., Disp: , Rfl:     hydrOXYzine (ATARAX) 25 MG tablet, Take 1 tablet by mouth., Disp: , Rfl:     multivitamin with minerals tablet tablet, Take 1 tablet by " "mouth Daily., Disp: , Rfl:     pantoprazole (PROTONIX) 40 MG EC tablet, Take 1 tablet by mouth Daily., Disp: , Rfl:     rosuvastatin (CRESTOR) 10 MG tablet, TAKE 1 TABLET BY MOUTH EVERY DAY, Disp: 90 tablet, Rfl: 0    TiZANidine (ZANAFLEX) 6 MG capsule, Take 1 capsule by mouth 3 (Three) Times a Day., Disp: 270 capsule, Rfl: 1    valACYclovir (VALTREX) 1000 MG tablet, TAKE 2 TABLETS BY MOUTH TWICE A DAY, Disp: 4 tablet, Rfl: 1    vitamin B-12 (CYANOCOBALAMIN) 1000 MCG tablet, Take 1 tablet by mouth Daily., Disp: , Rfl:      PMFSH  The following portions of the patient's history were reviewed and updated as appropriate: allergies, current medications, past family history, past medical history, past social history, past surgical history, and problem list.    Review of Systems   Constitutional:  Negative for activity change, appetite change and fatigue.   HENT:  Negative for congestion and rhinorrhea.    Respiratory:  Negative for chest tightness and shortness of breath.    Cardiovascular:  Negative for chest pain and palpitations.   Gastrointestinal:  Positive for diarrhea and nausea. Negative for abdominal pain.   Genitourinary:  Negative for dysuria.   Musculoskeletal:  Positive for arthralgias and myalgias.   Neurological:  Negative for dizziness, weakness, light-headedness and headaches.   Psychiatric/Behavioral:  Negative for dysphoric mood. The patient is not nervous/anxious.      Objective   /92   Pulse 98   Ht 162.6 cm (64\")   Wt 83.5 kg (184 lb)   LMP 06/06/2022   SpO2 98%   BMI 31.58 kg/mý     Physical Exam  Vitals and nursing note reviewed.   Constitutional:       Appearance: She is well-developed.   HENT:      Head: Normocephalic and atraumatic.      Right Ear: External ear normal.      Left Ear: External ear normal.   Eyes:      Conjunctiva/sclera: Conjunctivae normal.   Cardiovascular:      Rate and Rhythm: Normal rate and regular rhythm.   Pulmonary:      Effort: Pulmonary effort is normal. "      Breath sounds: Normal breath sounds.   Abdominal:      General: Bowel sounds are normal.      Palpations: Abdomen is soft.      Tenderness:  in the epigastric area   Genitourinary:     Rectum: Guaiac result negative. External hemorrhoid present. No mass or tenderness.   Musculoskeletal:         General: Normal range of motion.      Cervical back: Normal range of motion.   Skin:     General: Skin is warm and dry.   Psychiatric:         Behavior: Behavior normal.            ASSESSMENT/PLAN    Diagnoses and all orders for this visit:    1. Diarrhea, unspecified type (Primary)  Comments:  Check stool studies and labs as ordered. Maintain water and electrolytes. Eat yogurt, bland foods, progress as tolerated. Further recs based on results.  Orders:  -     Gastrointestinal Panel, PCR - Stool, Per Rectum; Future  -     Ova & Parasite Examination - , Per Rectum; Future  -     Fecal Leukocytes - , Per Rectum; Future  -     Clostridioides difficile Toxin - , Per Rectum; Future  -     Comprehensive Metabolic Panel; Future  -     CBC & Differential; Future    2. Colon cancer screening  -     Ambulatory Referral For Screening Colonoscopy             Return if symptoms worsen or fail to improve, for Next scheduled follow up.          Transcribed from ambient dictation for FAROOQ Berrios by Melissa Armenta.  08/09/23   10:14 EDT    Patient or patient representative verbalized consent to the visit recording.  I have personally performed the services described in this document as transcribed by the above individual, and it is both accurate and complete.

## 2023-08-12 ENCOUNTER — LAB (OUTPATIENT)
Dept: LAB | Facility: HOSPITAL | Age: 48
End: 2023-08-12
Payer: COMMERCIAL

## 2023-08-12 PROCEDURE — 87205 SMEAR GRAM STAIN: CPT

## 2023-08-12 PROCEDURE — 87177 OVA AND PARASITES SMEARS: CPT

## 2023-08-12 PROCEDURE — 87507 IADNA-DNA/RNA PROBE TQ 12-25: CPT

## 2023-08-12 PROCEDURE — 87209 SMEAR COMPLEX STAIN: CPT

## 2023-08-12 PROCEDURE — 87493 C DIFF AMPLIFIED PROBE: CPT

## 2023-08-13 LAB
ADV 40+41 DNA STL QL NAA+NON-PROBE: NOT DETECTED
ASTRO TYP 1-8 RNA STL QL NAA+NON-PROBE: NOT DETECTED
C CAYETANENSIS DNA STL QL NAA+NON-PROBE: NOT DETECTED
C COLI+JEJ+UPSA DNA STL QL NAA+NON-PROBE: NOT DETECTED
C DIFF TOX GENS STL QL NAA+PROBE: NOT DETECTED
CRYPTOSP DNA STL QL NAA+NON-PROBE: NOT DETECTED
E HISTOLYT DNA STL QL NAA+NON-PROBE: NOT DETECTED
EAEC PAA PLAS AGGR+AATA ST NAA+NON-PRB: NOT DETECTED
EC STX1+STX2 GENES STL QL NAA+NON-PROBE: NOT DETECTED
EPEC EAE GENE STL QL NAA+NON-PROBE: NOT DETECTED
ETEC LTA+ST1A+ST1B TOX ST NAA+NON-PROBE: NOT DETECTED
G LAMBLIA DNA STL QL NAA+NON-PROBE: NOT DETECTED
NOROVIRUS GI+II RNA STL QL NAA+NON-PROBE: NOT DETECTED
P SHIGELLOIDES DNA STL QL NAA+NON-PROBE: NOT DETECTED
RVA RNA STL QL NAA+NON-PROBE: NOT DETECTED
S ENT+BONG DNA STL QL NAA+NON-PROBE: NOT DETECTED
SAPO I+II+IV+V RNA STL QL NAA+NON-PROBE: NOT DETECTED
SHIGELLA SP+EIEC IPAH ST NAA+NON-PROBE: NOT DETECTED
V CHOL+PARA+VUL DNA STL QL NAA+NON-PROBE: NOT DETECTED
V CHOLERAE DNA STL QL NAA+NON-PROBE: NOT DETECTED
WBC STL QL MICRO: ABNORMAL
Y ENTEROCOL DNA STL QL NAA+NON-PROBE: NOT DETECTED

## 2023-08-14 ENCOUNTER — TELEPHONE (OUTPATIENT)
Dept: BEHAVIORAL HEALTH | Facility: CLINIC | Age: 48
End: 2023-08-14
Payer: COMMERCIAL

## 2023-08-14 NOTE — TELEPHONE ENCOUNTER
----- Message from FAROOQ Delgado sent at 8/14/2023  3:13 PM EDT -----  Please let her know that her stool study was positive for a few white blood cells but so far all testing is negative for a specific bacteria or virus. How is she feeling?

## 2023-08-14 NOTE — PROGRESS NOTES
Please let her know that her stool study was positive for a few white blood cells but so far all testing is negative for a specific bacteria or virus. How is she feeling?

## 2023-08-17 ENCOUNTER — PATIENT MESSAGE (OUTPATIENT)
Dept: INTERNAL MEDICINE | Facility: CLINIC | Age: 48
End: 2023-08-17
Payer: COMMERCIAL

## 2023-08-17 DIAGNOSIS — M79.10 MYALGIA: ICD-10-CM

## 2023-08-17 DIAGNOSIS — E78.01 FAMILIAL HYPERCHOLESTEROLEMIA: ICD-10-CM

## 2023-08-17 DIAGNOSIS — G47.00 INSOMNIA, UNSPECIFIED TYPE: ICD-10-CM

## 2023-08-17 RX ORDER — ROSUVASTATIN CALCIUM 10 MG/1
TABLET, COATED ORAL
Qty: 90 TABLET | Refills: 0 | Status: SHIPPED | OUTPATIENT
Start: 2023-08-17

## 2023-08-17 RX ORDER — HYDROXYZINE HYDROCHLORIDE 25 MG/1
25 TABLET, FILM COATED ORAL EVERY 8 HOURS PRN
Qty: 30 TABLET | Refills: 2 | Status: SHIPPED | OUTPATIENT
Start: 2023-08-17 | End: 2023-08-21

## 2023-08-21 ENCOUNTER — OFFICE VISIT (OUTPATIENT)
Dept: ORTHOPEDIC SURGERY | Facility: CLINIC | Age: 48
End: 2023-08-21
Payer: COMMERCIAL

## 2023-08-21 DIAGNOSIS — G56.03 BILATERAL CARPAL TUNNEL SYNDROME: ICD-10-CM

## 2023-08-21 PROCEDURE — 76942 ECHO GUIDE FOR BIOPSY: CPT | Performed by: STUDENT IN AN ORGANIZED HEALTH CARE EDUCATION/TRAINING PROGRAM

## 2023-08-21 PROCEDURE — 99213 OFFICE O/P EST LOW 20 MIN: CPT | Performed by: STUDENT IN AN ORGANIZED HEALTH CARE EDUCATION/TRAINING PROGRAM

## 2023-08-21 PROCEDURE — 20526 THER INJECTION CARP TUNNEL: CPT | Performed by: STUDENT IN AN ORGANIZED HEALTH CARE EDUCATION/TRAINING PROGRAM

## 2023-08-21 RX ORDER — VIT C/B6/B5/MAGNESIUM/HERB 173 50-5-6-5MG
CAPSULE ORAL
COMMUNITY

## 2023-08-21 RX ORDER — TRIAMCINOLONE ACETONIDE 40 MG/ML
40 INJECTION, SUSPENSION INTRA-ARTICULAR; INTRAMUSCULAR
Status: COMPLETED | OUTPATIENT
Start: 2023-08-21 | End: 2023-08-21

## 2023-08-21 RX ORDER — HYDROCORTISONE ACETATE 0.5 %
CREAM (GRAM) TOPICAL
COMMUNITY

## 2023-08-21 RX ORDER — ACETYLCYSTEINE 600 MG
CAPSULE ORAL
COMMUNITY

## 2023-08-21 RX ORDER — DULOXETIN HYDROCHLORIDE 30 MG/1
30 CAPSULE, DELAYED RELEASE ORAL DAILY
COMMUNITY
Start: 2023-08-11

## 2023-08-21 RX ORDER — AMITRIPTYLINE HYDROCHLORIDE 25 MG/1
25 TABLET, FILM COATED ORAL NIGHTLY
Qty: 30 TABLET | Refills: 1 | Status: SHIPPED | OUTPATIENT
Start: 2023-08-21

## 2023-08-21 RX ORDER — LIDOCAINE HYDROCHLORIDE 10 MG/ML
1 INJECTION, SOLUTION EPIDURAL; INFILTRATION; INTRACAUDAL; PERINEURAL
Status: COMPLETED | OUTPATIENT
Start: 2023-08-21 | End: 2023-08-21

## 2023-08-21 RX ADMIN — TRIAMCINOLONE ACETONIDE 40 MG: 40 INJECTION, SUSPENSION INTRA-ARTICULAR; INTRAMUSCULAR at 09:39

## 2023-08-21 RX ADMIN — LIDOCAINE HYDROCHLORIDE 1 ML: 10 INJECTION, SOLUTION EPIDURAL; INFILTRATION; INTRACAUDAL; PERINEURAL at 09:39

## 2023-08-21 NOTE — PROGRESS NOTES
[facility] [place] [facility] Office Follow Up Visit     Office Follow Up      Date: 08/21/2023   Patient Name: Rona Thomson  MRN: [id number]  YOB: 1975    Referring Physician: No ref. provider found     Chief Complaint:   Chief Complaint   Patient presents with    Follow-up     6 week follow up --EMG(8/2/23)-Bilateral carpal tunnel syndrome       History of Present Illness: Rona Thomson is a 48 y.o. female who is here today for follow up on bilateral hand numbness and tingling.  Today, her pain is rated a 0/10.  She has been wearing braces and taking anti-inflammatories.  Since last visit, she did undergo bilateral upper extremity EMG.  She is here today to discuss those results.  Overall, she feels unchanged.    Subjective   Review of Systems: Review of Systems   Constitutional:  Negative for chills, fever, unexpected weight gain and unexpected weight loss.   HENT:  Negative for congestion, postnasal drip and rhinorrhea.    Eyes:  Negative for blurred vision.   Respiratory:  Negative for shortness of breath.    Cardiovascular:  Negative for leg swelling.   Gastrointestinal:  Negative for abdominal pain, nausea and vomiting.   Genitourinary:  Negative for difficulty urinating.   Musculoskeletal:  Positive for arthralgias. Negative for gait problem, joint swelling and myalgias.   Skin:  Negative for skin lesions and wound.   Neurological:  Negative for dizziness, weakness, light-headedness and numbness.   Hematological:  Does not bruise/bleed easily.   Psychiatric/Behavioral:  Negative for depressed mood.       Medications:   Current Outpatient Medications:     amitriptyline (ELAVIL) 25 MG tablet, Take 1 tablet by mouth Every Night., Disp: 30 tablet, Rfl: 1    CALCIUM-MAGNESIUM-ZINC PO, , Disp: , Rfl:     cetirizine (ZyrTEC) 10 MG tablet, Take 1 tablet by mouth Daily., Disp: , Rfl:     cholecalciferol (VITAMIN D3) 1000 UNITS tablet, Take 1 tablet by mouth  Daily., Disp: , Rfl:     DULoxetine (CYMBALTA) 30 MG capsule, Take 1 capsule by mouth Daily., Disp: , Rfl:     fluticasone (FLONASE) 50 MCG/ACT nasal spray, 2 sprays by Each Nare route As Needed for Allergies., Disp: , Rfl:     Glucosamine-Chondroit-Vit C-Mn (Glucosamine 1500 Complex) capsule, , Disp: , Rfl:     multivitamin with minerals tablet tablet, Take 1 tablet by mouth Daily., Disp: , Rfl:     pantoprazole (PROTONIX) 40 MG EC tablet, Take 1 tablet by mouth Daily., Disp: , Rfl:     Probiotic Product (Probio Defense) capsule, , Disp: , Rfl:     rosuvastatin (CRESTOR) 10 MG tablet, TAKE 1 TABLET BY MOUTH EVERY DAY, Disp: 90 tablet, Rfl: 0    TiZANidine (ZANAFLEX) 6 MG capsule, Take 1 capsule by mouth 3 (Three) Times a Day., Disp: 270 capsule, Rfl: 1    Turmeric 500 MG capsule, , Disp: , Rfl:     vitamin B-12 (CYANOCOBALAMIN) 1000 MCG tablet, Take 1 tablet by mouth Daily., Disp: , Rfl:     Allergies:   Allergies   Allergen Reactions    Ultram [Tramadol] Nausea And Vomiting and Headache       I have reviewed and updated the patient's chief complaint, history of present illness, review of systems, past medical history, surgical history, family history, social history, medications and allergy list as appropriate.     Objective    Vital Signs: There were no vitals filed for this visit.  There is no height or weight on file to calculate BMI.  BMI is >= 30 and <35. (Class 1 Obesity). The following options were offered after discussion;: exercise counseling/recommendations and nutrition counseling/recommendations     Patient reports that she is a former smoker. She quit smoking in 2012.  She has not resumed smoking since that time.  This behavior was applauded and she was encouraged to continue in smoking cessation.  We will continue to monitor at subsequent visits.     Ortho Exam:  Constitutional: General Appearance: healthy-appearing, NAD, and normal body habitus.      Cardiovascular System: Arterial Pulses Right:  radial pulse normal. Arterial Pulses Left: radial pulse normal. Edema Right: none. Edema Left: none. Varicosities Right: capillary refill test normal. Varicosities Left: capillary refill test normal.   Skin: Right Upper Extremity: normal. Left Upper Extremity: normal.   Neurological System: Sensation on the Right: normal ulnar nerve distribution and radial nerve distribution and decreased median nerve distribution. Sensation on the Left: normal ulnar nerve distribution and radial nerve distribution and decreased median nerve distribution. Special Tests on the Right: Tinel's sign at the median nerve positive and carpal compression test positive. Special Tests on the Left: Tinel's sign at the median nerve positive and carpal compression test positive.    Results Review:   Imaging Results (Last 24 Hours)       Procedure Component Value Units Date/Time    US Guided Injection [[id number]] Resulted: 08/21/23 0941     Updated: 08/21/23 0941            Procedures  I personally reviewed the bilateral EMG of the upper extremities.  There is normal bilateral ulnar and radial nerve function.  There is abnormal median nerve function in both arms worse on the right with demyelination.    Assessment / Plan    Assessment/Plan:   Diagnoses and all orders for this visit:    1. Bilateral carpal tunnel syndrome (Primary)  -     US Guided Injection    Other orders  -     - Hand/Upper Extremity Injection: R carpal tunnel      Follow-up on bilateral hand numbness and tingling.  EMG results show bilateral carpal tunnel syndrome with worsening symptoms on the right with demyelination.  Her pain is under control but she does have persistent pins-and-needles in both fingers to the distribution of the median nerve.  We discussed the procedure findings.  Discussed treatment options including surgical release as well as continued nonoperative treatment and possibly adding an ultrasound-guided carpal tunnel injection.  She is in the process of  adopting a child and will likely add a second at the end of this year.  She is concerned about dropping the child secondary to hand weakness.  She is not quite ready yet for surgical release.  Did inform her that injection may help her symptoms and provide her with up to a year of deferment to then potentially have the surgery.  After that discussion, she is elected to proceed with ultrasound-guided right carpal tunnel corticosteroid injection.  Risk and benefits were discussed.  Tolerated the procedure well.  See procedure note.  I will have her monitor symptoms and follow-up as they dictate.    Follow Up:   Return if symptoms worsen or fail to improve.      Vernon Donovan MD  [facility] [place] [facility]

## 2023-08-21 NOTE — PROGRESS NOTES
Procedure   - Hand/Upper Extremity Injection: R carpal tunnel for carpal tunnel syndrome on 8/21/2023 9:39 AM  Indications: pain  Details: 25 G needle, ultrasound-guided volar approach  Medications: 1 mL lidocaine PF 1% 1 %; 40 mg triamcinolone acetonide 40 MG/ML  Outcome: tolerated well, no immediate complications  Procedure, treatment alternatives, risks and benefits explained, specific risks discussed. Consent was given by the patient. Immediately prior to procedure a time out was called to verify the correct patient, procedure, equipment, support staff and site/side marked as required. Patient was prepped and draped in the usual sterile fashion.

## 2023-08-25 LAB
O+P STL CONC: NORMAL
O+P STL MICRO: NORMAL
SPECIMEN STATUS: NORMAL

## 2023-08-30 DIAGNOSIS — Z12.11 SCREENING FOR COLON CANCER: Primary | ICD-10-CM

## 2023-09-06 ENCOUNTER — OUTSIDE FACILITY SERVICE (OUTPATIENT)
Dept: GASTROENTEROLOGY | Facility: CLINIC | Age: 48
End: 2023-09-06
Payer: COMMERCIAL

## 2023-09-06 PROCEDURE — 45378 DIAGNOSTIC COLONOSCOPY: CPT | Performed by: INTERNAL MEDICINE

## 2023-10-14 DIAGNOSIS — G47.00 INSOMNIA, UNSPECIFIED TYPE: ICD-10-CM

## 2023-10-14 DIAGNOSIS — M79.10 MYALGIA: ICD-10-CM

## 2023-10-14 RX ORDER — AMITRIPTYLINE HYDROCHLORIDE 25 MG/1
25 TABLET, FILM COATED ORAL NIGHTLY
Qty: 30 TABLET | Refills: 0 | Status: SHIPPED | OUTPATIENT
Start: 2023-10-14

## 2023-11-10 ENCOUNTER — OFFICE VISIT (OUTPATIENT)
Dept: FAMILY MEDICINE CLINIC | Facility: CLINIC | Age: 48
End: 2023-11-10
Payer: COMMERCIAL

## 2023-11-10 VITALS
HEART RATE: 102 BPM | WEIGHT: 183.6 LBS | DIASTOLIC BLOOD PRESSURE: 68 MMHG | OXYGEN SATURATION: 98 % | HEIGHT: 64 IN | SYSTOLIC BLOOD PRESSURE: 110 MMHG | BODY MASS INDEX: 31.34 KG/M2 | TEMPERATURE: 98.4 F

## 2023-11-10 DIAGNOSIS — M79.10 MYALGIA: ICD-10-CM

## 2023-11-10 DIAGNOSIS — K21.9 GASTROESOPHAGEAL REFLUX DISEASE WITHOUT ESOPHAGITIS: ICD-10-CM

## 2023-11-10 DIAGNOSIS — F41.9 ANXIETY: Primary | ICD-10-CM

## 2023-11-10 DIAGNOSIS — E55.9 VITAMIN D DEFICIENCY: ICD-10-CM

## 2023-11-10 DIAGNOSIS — E78.01 FAMILIAL HYPERCHOLESTEROLEMIA: ICD-10-CM

## 2023-11-10 DIAGNOSIS — G47.00 INSOMNIA, UNSPECIFIED TYPE: ICD-10-CM

## 2023-11-10 DIAGNOSIS — M62.838 MUSCLE SPASM: ICD-10-CM

## 2023-11-10 RX ORDER — TIZANIDINE HYDROCHLORIDE 6 MG/1
6 CAPSULE, GELATIN COATED ORAL 2 TIMES DAILY PRN
Qty: 180 CAPSULE | Refills: 1 | Status: SHIPPED | OUTPATIENT
Start: 2023-11-10

## 2023-11-10 RX ORDER — DULOXETIN HYDROCHLORIDE 30 MG/1
30 CAPSULE, DELAYED RELEASE ORAL DAILY
Qty: 90 CAPSULE | Refills: 2 | Status: SHIPPED | OUTPATIENT
Start: 2023-11-10

## 2023-11-10 RX ORDER — PANTOPRAZOLE SODIUM 40 MG/1
40 TABLET, DELAYED RELEASE ORAL DAILY
Qty: 90 TABLET | Refills: 2 | Status: SHIPPED | OUTPATIENT
Start: 2023-11-10

## 2023-11-10 RX ORDER — AMITRIPTYLINE HYDROCHLORIDE 25 MG/1
25 TABLET, FILM COATED ORAL NIGHTLY
Qty: 90 TABLET | Refills: 2 | Status: SHIPPED | OUTPATIENT
Start: 2023-11-10

## 2023-11-10 RX ORDER — VALACYCLOVIR HYDROCHLORIDE 500 MG/1
500 TABLET, FILM COATED ORAL 2 TIMES DAILY
COMMUNITY

## 2023-11-10 RX ORDER — ROSUVASTATIN CALCIUM 10 MG/1
10 TABLET, COATED ORAL DAILY
Qty: 90 TABLET | Refills: 2 | Status: SHIPPED | OUTPATIENT
Start: 2023-11-10

## 2023-11-10 NOTE — PROGRESS NOTES
Subjective   Rona Thomson is a 48 y.o. female.   Pt presents today with  of Establish Care      History of Present Illness   History of Present Illness  1.  Patient is a pleasant 48-year-old female to establish care.  #2 she has history of endometriosis.  She had hysterectomy with her gynecologist, Dr. Miller.  She also had problems with infertility prior to this, she has now adopted a child almost 2 years ago, the child is now 2.  She still has problems with hot flashes from menopausal symptoms.  She is not a good candidate for estrogen replacement therapy because of history of blood clot in her right leg.  She follows with gynecology.  #3 she has chronic pain in multiple joints including her back and neck in multiple muscles.  She has been diagnosed in the past with a myofascial pain disease.  She is doing well currently on Cymbalta 30 mg daily and amitriptyline 25 mg nightly.  She has not had any problems with these medications other than she has gained a little weight that is unclear if it is being a new mother, leaving the workforce, or perhaps these medications.  She also takes Zanaflex regularly.  #4 she has hyperlipidemia associated with history of blood clot.  She takes Crestor just 10 mg daily.  #5 she has GERD for which she takes Protonix 40 mg daily.       The following portions of the patient's history were reviewed and updated as appropriate: allergies, current medications, past family history, past medical history, past social history, past surgical history, and problem list.    Review of Systems   Constitutional:  Negative for chills, fever and unexpected weight loss.   HENT:  Negative for congestion and sore throat.    Eyes:  Negative for blurred vision and visual disturbance.   Respiratory:  Negative for cough and wheezing.    Cardiovascular:  Negative for chest pain and palpitations.   Gastrointestinal:  Negative for abdominal pain and diarrhea.   Endocrine: Negative for cold intolerance  and heat intolerance.   Genitourinary:  Negative for dysuria.   Musculoskeletal:  Negative for arthralgias and neck stiffness.   Neurological:  Negative for dizziness, seizures and syncope.   Psychiatric/Behavioral:  Negative for self-injury, suicidal ideas and depressed mood.        Objective   Physical Exam  Vitals and nursing note reviewed.   Constitutional:       Appearance: She is well-developed.   HENT:      Head: Normocephalic and atraumatic.      Nose: Nose normal.   Eyes:      Conjunctiva/sclera: Conjunctivae normal.      Pupils: Pupils are equal, round, and reactive to light.   Cardiovascular:      Rate and Rhythm: Normal rate and regular rhythm.      Heart sounds: Normal heart sounds.   Pulmonary:      Effort: Pulmonary effort is normal.      Breath sounds: Normal breath sounds.   Musculoskeletal:      Cervical back: Normal range of motion and neck supple.   Skin:     General: Skin is warm and dry.   Neurological:      Mental Status: She is alert and oriented to person, place, and time.   Psychiatric:         Mood and Affect: Mood normal.         Behavior: Behavior normal.         Thought Content: Thought content normal.           Assessment & Plan   Diagnoses and all orders for this visit:    1. Anxiety (Primary)  -     DULoxetine (CYMBALTA) 30 MG capsule; Take 1 capsule by mouth Daily.  Dispense: 90 capsule; Refill: 2  Does well with Cymbalta.  This also helps with her chronic pain.  She takes this along with amitriptyline.  She is not having any problems and is sleeping well with amitriptyline.  We will continue the current treatment.  2. Myalgia  -     amitriptyline (ELAVIL) 25 MG tablet; Take 1 tablet by mouth Every Night.  Dispense: 90 tablet; Refill: 2  -     TiZANidine (ZANAFLEX) 6 MG capsule; Take 1 capsule by mouth 2 (Two) Times a Day As Needed for Muscle Spasms.  Dispense: 180 capsule; Refill: 1  Currently takes Zanaflex 1 or 2 times daily.  I would like to avoid taking it scheduled.  She  should continue to take it as needed.  3. Insomnia, unspecified type  -     amitriptyline (ELAVIL) 25 MG tablet; Take 1 tablet by mouth Every Night.  Dispense: 90 tablet; Refill: 2    4. Familial hypercholesterolemia  -     rosuvastatin (CRESTOR) 10 MG tablet; Take 1 tablet by mouth Daily.  Dispense: 90 tablet; Refill: 2    5. Gastroesophageal reflux disease without esophagitis  -     pantoprazole (PROTONIX) 40 MG EC tablet; Take 1 tablet by mouth Daily.  Dispense: 90 tablet; Refill: 2    6. Muscle spasm  -     TiZANidine (ZANAFLEX) 6 MG capsule; Take 1 capsule by mouth 2 (Two) Times a Day As Needed for Muscle Spasms.  Dispense: 180 capsule; Refill: 1  I discussed the risk versus benefits to all of her medications, most notably tizanidine as long-term regular use has not been well studied.  7. Vitamin D deficiency    She would prefer not to have updated blood work today.  We will get blood work at follow-up if she has not had it with her specialist.    #8 hot flashes     She is going to discuss with her gynecologist.  Her history of blood clot in her right leg, that was questionable whether it was provoked by a prior injury from a horse kick, it is unclear.  She has not had hypercoagulable work-up, her rheumatologic work-up was negative.  She is not a candidate for estrogen at this time.  Veozah might be an option   .                This document has been electronically signed by Jim Martinez DO  November 10, 2023 10:00 EST    Dictated Utilizing Dragon Dictation: Part of this note may be an electronic transcription/translation of spoken language to printed text using the Dragon Dictation System.

## 2023-11-19 DIAGNOSIS — E78.01 FAMILIAL HYPERCHOLESTEROLEMIA: ICD-10-CM

## 2023-11-20 RX ORDER — ROSUVASTATIN CALCIUM 10 MG/1
10 TABLET, COATED ORAL DAILY
Qty: 90 TABLET | Refills: 2 | OUTPATIENT
Start: 2023-11-20

## 2023-11-21 DIAGNOSIS — M62.838 MUSCLE SPASM: ICD-10-CM

## 2023-11-21 DIAGNOSIS — M79.10 MYALGIA: ICD-10-CM

## 2023-11-22 DIAGNOSIS — F41.9 ANXIETY: ICD-10-CM

## 2023-11-22 RX ORDER — TIZANIDINE HYDROCHLORIDE 6 MG/1
6 CAPSULE, GELATIN COATED ORAL 2 TIMES DAILY PRN
Qty: 180 CAPSULE | Refills: 1 | OUTPATIENT
Start: 2023-11-22

## 2023-11-22 RX ORDER — DULOXETIN HYDROCHLORIDE 60 MG/1
60 CAPSULE, DELAYED RELEASE ORAL DAILY
Qty: 90 CAPSULE | Refills: 2 | Status: SHIPPED | OUTPATIENT
Start: 2023-11-22

## 2024-03-08 ENCOUNTER — OFFICE VISIT (OUTPATIENT)
Dept: FAMILY MEDICINE CLINIC | Facility: CLINIC | Age: 49
End: 2024-03-08
Payer: COMMERCIAL

## 2024-03-08 VITALS
BODY MASS INDEX: 30.49 KG/M2 | WEIGHT: 178.6 LBS | HEIGHT: 64 IN | SYSTOLIC BLOOD PRESSURE: 124 MMHG | OXYGEN SATURATION: 98 % | TEMPERATURE: 98 F | HEART RATE: 92 BPM | DIASTOLIC BLOOD PRESSURE: 80 MMHG

## 2024-03-08 DIAGNOSIS — L29.9 ITCHING: ICD-10-CM

## 2024-03-08 DIAGNOSIS — R23.3 EASY BRUISING: ICD-10-CM

## 2024-03-08 DIAGNOSIS — F41.1 GAD (GENERALIZED ANXIETY DISORDER): Primary | ICD-10-CM

## 2024-03-08 DIAGNOSIS — R21 RASH: ICD-10-CM

## 2024-03-08 PROCEDURE — 99214 OFFICE O/P EST MOD 30 MIN: CPT | Performed by: FAMILY MEDICINE

## 2024-03-08 RX ORDER — HYDROXYZINE HYDROCHLORIDE 25 MG/1
25 TABLET, FILM COATED ORAL 3 TIMES DAILY PRN
Qty: 90 TABLET | Refills: 1 | Status: SHIPPED | OUTPATIENT
Start: 2024-03-08

## 2024-03-08 NOTE — PROGRESS NOTES
Lula hTomson is a 48 y.o. female.   Pt presents today with CC of Itching      History of Present Illness   History of Present Illness     The following portions of the patient's history were reviewed and updated as appropriate: allergies, current medications, past family history, past medical history, past social history, past surgical history, and problem list.    Review of Systems   Constitutional:  Negative for chills, fatigue, fever and unexpected weight loss.   HENT:  Negative for congestion, rhinorrhea and sore throat.    Eyes:  Negative for blurred vision and visual disturbance.   Respiratory:  Negative for cough, shortness of breath and wheezing.    Cardiovascular:  Negative for chest pain and palpitations.   Gastrointestinal:  Negative for abdominal pain, diarrhea and vomiting.   Endocrine: Negative for cold intolerance and heat intolerance.   Genitourinary:  Negative for dysuria.   Musculoskeletal:  Negative for arthralgias and neck stiffness.   Skin:  Positive for rash.   Neurological:  Negative for dizziness, seizures and syncope.   Psychiatric/Behavioral:  Negative for self-injury, suicidal ideas and depressed mood.      Vitals:    03/08/24 0932   BP: 124/80   Pulse: 92   Temp: 98 °F (36.7 °C)   SpO2: 98%        Objective   Physical Exam  Vitals and nursing note reviewed.   Constitutional:       Appearance: She is well-developed.   HENT:      Head: Normocephalic and atraumatic.      Right Ear: External ear normal.      Left Ear: External ear normal.      Nose: Nose normal.   Eyes:      Conjunctiva/sclera: Conjunctivae normal.      Pupils: Pupils are equal, round, and reactive to light.   Cardiovascular:      Rate and Rhythm: Normal rate and regular rhythm.      Heart sounds: Normal heart sounds.   Pulmonary:      Effort: Pulmonary effort is normal.      Breath sounds: Normal breath sounds.   Abdominal:      General: Bowel sounds are normal.      Palpations: Abdomen is soft.    Musculoskeletal:      Cervical back: Normal range of motion and neck supple.   Skin:     General: Skin is warm and dry.      Comments: Excoriated rash on her bilateral arms.  Does not appear petechial.  A couple areas of bruising noted on her left arm and right thigh.  They appear to be normal bruises, no hematoma.   Neurological:      Mental Status: She is alert and oriented to person, place, and time.   Psychiatric:         Behavior: Behavior normal.           Assessment & Plan   Diagnoses and all orders for this visit:    1. TYLOR (generalized anxiety disorder) (Primary)  -     hydrOXYzine (ATARAX) 25 MG tablet; Take 1 tablet by mouth 3 (Three) Times a Day As Needed for Anxiety.  Dispense: 90 tablet; Refill: 1  I recommend continuing Cymbalta and trial of hydroxyzine.  This should help with any itching, as well as with anxiety.  I discussed the side effects with her and she is agreeable to proceed.  She is can to follow-up in a few weeks to see how she is doing.  2. Easy bruising  -     Ambulatory Referral to Hematology  On medical record, she had unexpected heavy bleeding after her hysterectomy, she reports that she has bright red blood per rectum fairly often throughout her adult life, none recently, but frequently per report without abdominal pain.  She reports this is due to hemorrhoids or perhaps fissure.  She reports that she has had endoscopy without an answer for her bleeding.  3. Rash  -     Ambulatory Referral to Hematology  She has a history of bleeding unexpectedly after surgery and bright red blood per rectum.  She has been evaluated for her rash by dermatology who suspects that it is a bleeding issue(per report).  She is currently not taking any platelet inhibiting medications.  She is no longer taking any of her herbal medications out of caution.  I would like for her to speak with hematology and see what their opinion is.  Autoimmune disease not strongly suspected, though she has history of it in  her family.  With recent blood work that was normal, and how she essentially looks normal with the exception of rash and a little bruising, I do not believe that emergent evaluation is necessary.  4. Itching  -     hydrOXYzine (ATARAX) 25 MG tablet; Take 1 tablet by mouth 3 (Three) Times a Day As Needed for Anxiety.  Dispense: 90 tablet; Refill: 1                               This document has been electronically signed by Jim Martinez DO  March 8, 2024 12:49 EST    Dictated Utilizing Dragon Dictation: Part of this note may be an electronic transcription/translation of spoken language to printed text using the Dragon Dictation System.

## 2024-03-21 ENCOUNTER — OFFICE VISIT (OUTPATIENT)
Dept: FAMILY MEDICINE CLINIC | Facility: CLINIC | Age: 49
End: 2024-03-21
Payer: COMMERCIAL

## 2024-03-21 ENCOUNTER — LAB (OUTPATIENT)
Dept: FAMILY MEDICINE CLINIC | Facility: CLINIC | Age: 49
End: 2024-03-21
Payer: COMMERCIAL

## 2024-03-21 VITALS
HEIGHT: 64 IN | HEART RATE: 101 BPM | SYSTOLIC BLOOD PRESSURE: 118 MMHG | OXYGEN SATURATION: 98 % | DIASTOLIC BLOOD PRESSURE: 74 MMHG | WEIGHT: 182.2 LBS | TEMPERATURE: 98.7 F | BODY MASS INDEX: 31.1 KG/M2

## 2024-03-21 DIAGNOSIS — R23.3 EASY BRUISING: ICD-10-CM

## 2024-03-21 DIAGNOSIS — J34.89 MASS OF SINUS: ICD-10-CM

## 2024-03-21 DIAGNOSIS — R58 BLEEDING: ICD-10-CM

## 2024-03-21 DIAGNOSIS — R23.3 EASY BRUISING: Primary | ICD-10-CM

## 2024-03-21 LAB
APTT PPP: 35.6 SECONDS (ref 26.5–34.5)
INR PPP: 0.88 (ref 0.9–1.1)
PROTHROMBIN TIME: 12.5 SECONDS (ref 12.1–14.7)

## 2024-03-21 PROCEDURE — 85610 PROTHROMBIN TIME: CPT | Performed by: FAMILY MEDICINE

## 2024-03-21 PROCEDURE — 85025 COMPLETE CBC W/AUTO DIFF WBC: CPT | Performed by: FAMILY MEDICINE

## 2024-03-21 PROCEDURE — 85730 THROMBOPLASTIN TIME PARTIAL: CPT | Performed by: FAMILY MEDICINE

## 2024-03-21 PROCEDURE — 99213 OFFICE O/P EST LOW 20 MIN: CPT | Performed by: FAMILY MEDICINE

## 2024-03-21 NOTE — PROGRESS NOTES
Subjective   oRna Thomson is a 48 y.o. female.   Pt presents today with CC of easy bruising and prolonged bleeding      History of Present Illness   History of Present Illness  Patient is a 48-year-old female here to follow-up on easy bruising and prolonged bleeding.  She is agreeable to further evaluation.           The following portions of the patient's history were reviewed and updated as appropriate: allergies, current medications, past family history, past medical history, past social history, past surgical history, and problem list.    Review of Systems   Constitutional:  Negative for chills, fever and unexpected weight loss.   HENT:  Negative for congestion and sore throat.    Eyes:  Negative for blurred vision and visual disturbance.   Respiratory:  Negative for cough and wheezing.    Cardiovascular:  Negative for chest pain and palpitations.   Gastrointestinal:  Negative for abdominal pain and diarrhea.   Endocrine: Negative for cold intolerance and heat intolerance.   Genitourinary:  Negative for dysuria.   Musculoskeletal:  Negative for arthralgias and neck stiffness.   Neurological:  Negative for dizziness, seizures and syncope.   Psychiatric/Behavioral:  Negative for self-injury, suicidal ideas and depressed mood.      Vitals:    03/21/24 1319   BP: 118/74   Pulse: 101   Temp: 98.7 °F (37.1 °C)   SpO2: 98%        Objective   Physical Exam  Vitals and nursing note reviewed.   Constitutional:       Appearance: She is well-developed.   HENT:      Head: Normocephalic and atraumatic.      Right Ear: External ear normal.      Left Ear: External ear normal.      Nose: Nose normal.   Eyes:      Conjunctiva/sclera: Conjunctivae normal.      Pupils: Pupils are equal, round, and reactive to light.   Cardiovascular:      Rate and Rhythm: Normal rate and regular rhythm.      Heart sounds: Normal heart sounds.   Pulmonary:      Effort: Pulmonary effort is normal.      Breath sounds: Normal breath sounds.    Abdominal:      General: Bowel sounds are normal.      Palpations: Abdomen is soft.   Musculoskeletal:      Cervical back: Normal range of motion and neck supple.   Skin:     General: Skin is warm and dry.   Neurological:      Mental Status: She is alert and oriented to person, place, and time.   Psychiatric:         Behavior: Behavior normal.           Assessment & Plan   Diagnoses and all orders for this visit:    1. Easy bruising (Primary)  -     Peripheral Blood Smear; Future  -     Protime-INR; Future  -     APTT; Future  -     CBC w AUTO Differential; Future    2. Bleeding  -     Peripheral Blood Smear; Future  -     Protime-INR; Future  -     APTT; Future  -     CBC w AUTO Differential; Future    3. Mass of sinus  She follows with dental surgeon who found abnormality on her sinus scan.  They will be referring her to ENT.                              This document has been electronically signed by Aura Alcala  March 21, 2024 13:21 EDT    Dictated Utilizing Dragon Dictation: Part of this note may be an electronic transcription/translation of spoken language to printed text using the Dragon Dictation System.

## 2024-03-22 LAB
BASOPHILS # BLD AUTO: 0.09 10*3/MM3 (ref 0–0.2)
BASOPHILS NFR BLD AUTO: 1.3 % (ref 0–1.5)
DEPRECATED RDW RBC AUTO: 42.2 FL (ref 37–54)
EOSINOPHIL # BLD AUTO: 0.12 10*3/MM3 (ref 0–0.4)
EOSINOPHIL NFR BLD AUTO: 1.7 % (ref 0.3–6.2)
ERYTHROCYTE [DISTWIDTH] IN BLOOD BY AUTOMATED COUNT: 12.1 % (ref 12.3–15.4)
HCT VFR BLD AUTO: 41.6 % (ref 34–46.6)
HGB BLD-MCNC: 13.8 G/DL (ref 12–15.9)
IMM GRANULOCYTES # BLD AUTO: 0.01 10*3/MM3 (ref 0–0.05)
IMM GRANULOCYTES NFR BLD AUTO: 0.1 % (ref 0–0.5)
LAB AP CASE REPORT: NORMAL
LYMPHOCYTES # BLD AUTO: 2.34 10*3/MM3 (ref 0.7–3.1)
LYMPHOCYTES NFR BLD AUTO: 33.4 % (ref 19.6–45.3)
MCH RBC QN AUTO: 31.4 PG (ref 26.6–33)
MCHC RBC AUTO-ENTMCNC: 33.2 G/DL (ref 31.5–35.7)
MCV RBC AUTO: 94.8 FL (ref 79–97)
MONOCYTES # BLD AUTO: 0.65 10*3/MM3 (ref 0.1–0.9)
MONOCYTES NFR BLD AUTO: 9.3 % (ref 5–12)
NEUTROPHILS NFR BLD AUTO: 3.8 10*3/MM3 (ref 1.7–7)
NEUTROPHILS NFR BLD AUTO: 54.2 % (ref 42.7–76)
NRBC BLD AUTO-RTO: 0 /100 WBC (ref 0–0.2)
PATH REPORT.FINAL DX SPEC: NORMAL
PATHOLOGY REVIEW: YES
PLATELET # BLD AUTO: 316 10*3/MM3 (ref 140–450)
PMV BLD AUTO: 11.6 FL (ref 6–12)
RBC # BLD AUTO: 4.39 10*6/MM3 (ref 3.77–5.28)
WBC NRBC COR # BLD AUTO: 7.01 10*3/MM3 (ref 3.4–10.8)

## 2024-03-25 ENCOUNTER — TELEPHONE (OUTPATIENT)
Dept: FAMILY MEDICINE CLINIC | Facility: CLINIC | Age: 49
End: 2024-03-25
Payer: COMMERCIAL

## 2024-03-25 DIAGNOSIS — R23.3 EASY BRUISING: ICD-10-CM

## 2024-03-25 DIAGNOSIS — R79.1 ELEVATED PARTIAL THROMBOPLASTIN TIME (PTT): Primary | ICD-10-CM

## 2024-03-25 NOTE — TELEPHONE ENCOUNTER
----- Message from Jim Martinez DO sent at 3/25/2024  8:17 AM EDT -----  PTT was elevated, normal peripheral smear.  You should be hearing from hematology soon.

## 2024-04-08 NOTE — PROGRESS NOTES
Rona Thomson  [id number]  1975 4/12/2024      Referring Provider:   Jim Martinez DO    Reason for Consultation:   Easy Bruising  Elevated aPTT    Chief Complaint:  Easy Bruising      History of Present Illness   Rona Thomson is a very pleasant 48 y.o.  female who presents in new consultation at the request of Jim Martinez DO for further management and evaluation of easy bruising.    Ms. Thomson reports having diagnostic laproscopy and bilateral uretero lysis for endometriosis in 2016 and was also undergoing fertility treatment at the time. She went home that same day of procedure but later that day she developed right calf swelling and pain. She was evaluated and treated empirically with Eliquis (two doses) as she was unable to have an US until the next day. Duplex that was obtained was negative for DVT. She has no history significant for thrombosis. She reports undergoing multiple rounds of IVF which were unsuccessful. She did have a ALEM-BSO in 2023 and was started on Lovenox injections twice daily after the procedure however this had to be stopped two weeks into receiving injections due to heavy bleeding. Six months after procedure she started taking black cohash, tumeric, Vitamin C/D/B12, and glucosamine. In fall 2023 she began experiencing increased bruising and erythematous lesions on her extremities. Denies of any bruising on her chest, abdomen, or back. No issues with bleeding with surgery or dental procedures. She denies of any heavy menstrual cycles.             The following portions of the patient's history were reviewed and updated as appropriate: allergies, current medications, past family history, past medical history, past social history, past surgical history and problem list.      Allergies   Allergen Reactions    Ultram [Tramadol] Nausea And Vomiting and Headache         Past Medical History:   Diagnosis Date    Anxiety     Arthritis of back     Corneal  erosion of right eye     Degenerative disc disease, cervical     DVT (deep venous thrombosis) 2016    after diagnostic lap    Elevated cholesterol     Endometriosis     Fracture of wrist     Small bone in right hand    Fracture, foot     Right foot    GERD (gastroesophageal reflux disease)     Heart palpitations     HX OF PER PT    Low back strain     Neck pain     Neck strain     PONV (postoperative nausea and vomiting)     Scoliosis     Dx as a child    Tear of meniscus of knee Torn maniscus    Kicked m by horse- Right leg - just below knee    Wears contact lenses     Wears eyeglasses            Past Surgical History:   Procedure Laterality Date    BASAL CELL CARCINOMA EXCISION  2019    BREAST BIOPSY Right 2022    CHOLECYSTECTOMY      COLONOSCOPY      polyps removed    DIAGNOSTIC LAPAROSCOPY  12/2014    x 2 other in     DIAGNOSTIC LAPAROSCOPY N/A 07/15/2016    Procedure: ROBOTIC EXCISION OF ENDOMETRIOSIS, diagnostic laproscopy, bilateral uretero lysis;  Surgeon: Angeles Miller MD;  Location:  [facility];  Service:     ENDOSCOPY      polyps removed from stomach     TOTAL LAPAROSCOPIC HYSTERECTOMY SALPINGO OOPHORECTOMY N/A 2023    Procedure: TOTAL LAPAROSCOPIC HYSTERECTOMY BILATERAL SALPINGO OOPHORECTOMY WITH DAVINCI ROBOT;  Surgeon: Angeles Miller MD;  Location:  [facility];  Service: Robotics - DaVinci;  Laterality: N/A;    TRIGGER POINT INJECTION           Social History     Socioeconomic History    Marital status:    Tobacco Use    Smoking status: Former     Current packs/day: 0.00     Average packs/day: 1 pack/day for 23.6 years (23.6 ttl pk-yrs)     Types: Cigarettes     Start date: 1995     Quit date: 2019     Years since quittin.2     Passive exposure: Never    Smokeless tobacco: Never    Tobacco comments:     so glad i was able to quit   Vaping Use    Vaping status: Never Used   Substance and Sexual Activity    Alcohol use: No    Drug use: No     Sexual activity: Yes     Partners: Male     Birth control/protection: Hysterectomy   She has two adopted children. She was working as a hospice nurse for 15 years. No alcohol use, previous tobacco use.         Family History   Problem Relation Age of Onset    Cancer Father 62        Colon CA / DX 2013    Colon cancer Father     Hypertension Father     Colon cancer Maternal Grandmother     Breast cancer Maternal Aunt 30    Breast cancer Cousin 20    Breast cancer Mother Late 50s/early 60s    Cancer Mother         Breast CA / DX April 2019    Ovarian cancer Neg Hx    Maternal Grandmother - uterine cancer, lymphoma in her 90s          Current Outpatient Medications:     cetirizine (zyrTEC) 5 MG tablet, Take 1 tablet by mouth Daily., Disp: , Rfl:     DULoxetine (CYMBALTA) 60 MG capsule, Take 1 capsule by mouth Daily., Disp: 90 capsule, Rfl: 2    multivitamin with minerals tablet tablet, Take 1 tablet by mouth Daily., Disp: , Rfl:     pantoprazole (PROTONIX) 40 MG EC tablet, Take 1 tablet by mouth Daily., Disp: 90 tablet, Rfl: 2    rosuvastatin (CRESTOR) 10 MG tablet, Take 1 tablet by mouth Daily., Disp: 90 tablet, Rfl: 2    TiZANidine (ZANAFLEX) 6 MG capsule, Take 1 capsule by mouth 2 (Two) Times a Day As Needed for Muscle Spasms., Disp: 180 capsule, Rfl: 1  Hydroxyzine           Review of Systems  Constitutional: No fever, chills, night sweats, positive weight gain.   HEENT:  No headaches, vision changes or hearing changes, no sinus drainage, sore throat.   Cardiovascular:  No palpitations, chest pain, syncopal episodes or edema.   Pulmonary:  No shortness of breath, hemoptysis, or cough.   Gastrointestinal:  No nausea or vomiting. Chronic constipation, no diarrhea. Positive abdominal pain with hiatal hernia. No melena or hematochezia.   Genitourinary:  No hematuria, or changes in urination.   Musculoskeletal:  No pain, or joint problems.   Skin: No rashes or pruritus.   Endocrine:  Positive hot flashes, no chills    Hematologic:  No history of free bleeding, spontaneous bleeding or clotting problems. No lymphadenopathy.  Positive easy bruising.  Immunologic:  Seasonal allergies, no frequent infections.   Neurologic: No numbness, tingling, or weakness.   Psychiatric:  Positive anxiety, no depression.           Physical Exam  Vital Signs: These were reviewed and listed as per patient’s electronic medical chart  Vitals:    04/12/24 1049   BP: 129/84   Pulse: 87   Resp: 18   Temp: 97.5 °F (36.4 °C)   SpO2: 98%     General: Awake, alert and oriented, in no distress  HEENT: Head is atraumatic, normocephalic, extraocular movements full, no scleral icterus  Neck: supple, no jvd, lymphadenopathy or masses  Cardiovascular: regular rate and rhythm without murmurs, rubs or gallops  Pulmonary: non-labored, clear to auscultation bilaterally, no wheezing  Abdomen: soft, non-tender, non-distended, normal active bowel sounds present, no organomegaly  Extremities: No clubbing, cyanosis or edema  Lymph: No cervical, supraclavicular adenopathy  Neurologic: Mental status as above, alert, awake and oriented, grossly non-focal exam  Skin: warm, dry, intact        Pain Score:  Pain Score    04/12/24 1049   PainSc: 0-No pain           PHQ-Score Total:  PHQ-9 Total Score:          LABS/STUDIES:  Lab on 04/12/2024   Component Date Value    Protime 04/12/2024 12.3     INR 04/12/2024 0.87 (L)     PTT 04/12/2024 33.2     Fibrinogen 04/12/2024 401     WBC 04/12/2024 6.51     RBC 04/12/2024 4.48     Hemoglobin 04/12/2024 14.3     Hematocrit 04/12/2024 43.5     MCV 04/12/2024 97.1 (H)     MCH 04/12/2024 31.9     MCHC 04/12/2024 32.9     RDW 04/12/2024 12.4     RDW-SD 04/12/2024 44.7     MPV 04/12/2024 9.6     Platelets 04/12/2024 277     Neutrophil % 04/12/2024 53.5     Lymphocyte % 04/12/2024 33.3     Monocyte % 04/12/2024 9.5     Eosinophil % 04/12/2024 2.3     Basophil % 04/12/2024 1.1     Immature Grans % 04/12/2024 0.3     Neutrophils, Absolute  04/12/2024 3.48     Lymphocytes, Absolute 04/12/2024 2.17     Monocytes, Absolute 04/12/2024 0.62     Eosinophils, Absolute 04/12/2024 0.15     Basophils, Absolute 04/12/2024 0.07     Immature Grans, Absolute 04/12/2024 0.02     nRBC 04/12/2024 0.0    Lab on 03/21/2024   Component Date Value    Pathology Review 03/21/2024 Yes     Protime 03/21/2024 12.5     INR 03/21/2024 0.88 (L)     PTT 03/21/2024 35.6 (H)     WBC 03/21/2024 7.01     RBC 03/21/2024 4.39     Hemoglobin 03/21/2024 13.8     Hematocrit 03/21/2024 41.6     MCV 03/21/2024 94.8     MCH 03/21/2024 31.4     MCHC 03/21/2024 33.2     RDW 03/21/2024 12.1 (L)     RDW-SD 03/21/2024 42.2     MPV 03/21/2024 11.6     Platelets 03/21/2024 316     Neutrophil % 03/21/2024 54.2     Lymphocyte % 03/21/2024 33.4     Monocyte % 03/21/2024 9.3     Eosinophil % 03/21/2024 1.7     Basophil % 03/21/2024 1.3     Immature Grans % 03/21/2024 0.1     Neutrophils, Absolute 03/21/2024 3.80     Lymphocytes, Absolute 03/21/2024 2.34     Monocytes, Absolute 03/21/2024 0.65     Eosinophils, Absolute 03/21/2024 0.12     Basophils, Absolute 03/21/2024 0.09     Immature Grans, Absolute 03/21/2024 0.01     nRBC 03/21/2024 0.0     Final Diagnosis 03/21/2024                      Value:This result contains rich text formatting which cannot be displayed here.    Case Report 03/21/2024                      Value:Surgical Pathology Report                         Case: [id number]                                  Authorizing Provider:  Jim Martinez DO    Collected:           03/21/2024 01:52 PM          Ordering Location:     [facility]     Received:            03/22/2024 02:30 AM                                 [facility]                                                        Pathologist:           Odalys Bender MD                                                          Specimen:    Blood, Venous, peripheral blood smear                                                            PATHOLOGY:  10/15/21       3/30/22      1/19/23      3/21/24          Assessment & Plan   Rona Thomson is a very pleasant 48 y.o.  female who presents in new consultation at the request of Jim Martinez DO for further management and evaluation of easy bruising    Easy bruising  Elevated PTT  - Complete blood count repeated today shows a normal total WBC, hemoglobin, hematocrit, and platelet count. Coagulation markers show normal PT/PTT. Peripheral smear to assess platelet morphology is pending.  - To further evaluate obtained a TSH which is normal. Fibrinogen normal. Platelet function analysis normal. Vitamin K and C levels are pending.   - Pictures that patient had brought with her were reviewed and some lesions appear to be more vasculitic rather than bruising. She notes that she was evaluated by dermatology.  - I did recommend that she stop tumeric pills as well as black cohosh. She notes that she stopped this one week prior to be evaluated for labs with Dr. Martinez. Black cohosh can cause bleeding and can also cause weight gain, stomach upset, headaches, rash, liver damage. There are also a couple of cases of cutaneous vasculitis linked to black cohosh noted in the journal of [employer] dermat[employer].  - Will await work up and obtain EMMETT, RF, and ANCA studies at her next visit. If recurs she should be re-evaluated by dermatology and consider biopsy.  - Von Willebrand panel pending.    Hypoglycemia  - Her glucose is low today and may be contributing to her fatigue. She was called with these results so that she can drink OJ/fluids or eat to increase glucose.     ACO / CHERIE/Other  Quality measures  -  Rona Thomson  reports that she quit smoking about 5 years ago. Her smoking use included cigarettes. She started smoking about 28 years ago. She has a 23.6 pack-year smoking history. She has never been exposed to tobacco smoke. She has never used smokeless tobacco.   -   Rona Thomson received 2023 flu vaccine.  -  Rona Thomson reports a pain score of 0.  Given her pain assessment as noted, treatment options were discussed and the following options were decided upon as a follow-up plan to address the patient's pain: continuation of current treatment plan for pain.  -  Current outpatient and discharge medications have been reconciled for the patient.  Reviewed by: Dorina Zavala MD      I will have the patient return in follow up appointment to review test results in two weeks with labs (RF, EMMETT, ANCA, BMP). She understands that should she have any questions or concerns prior to her appointment she should give us a call at any time and I would be happy to see her sooner. It was a pleasure to see this patient in clinic today, thank you for allowing me to participate in the care of this patient.      Dorina Zavala MD   04/12/24   11:22 EDT

## 2024-04-11 DIAGNOSIS — R79.1 ELEVATED PARTIAL THROMBOPLASTIN TIME (PTT): Primary | ICD-10-CM

## 2024-04-11 DIAGNOSIS — R23.3 EASY BRUISING: ICD-10-CM

## 2024-04-12 ENCOUNTER — CONSULT (OUTPATIENT)
Dept: ONCOLOGY | Facility: CLINIC | Age: 49
End: 2024-04-12
Payer: COMMERCIAL

## 2024-04-12 ENCOUNTER — LAB (OUTPATIENT)
Dept: ONCOLOGY | Facility: CLINIC | Age: 49
End: 2024-04-12
Payer: COMMERCIAL

## 2024-04-12 ENCOUNTER — TELEPHONE (OUTPATIENT)
Dept: ONCOLOGY | Facility: CLINIC | Age: 49
End: 2024-04-12
Payer: COMMERCIAL

## 2024-04-12 VITALS
RESPIRATION RATE: 18 BRPM | HEIGHT: 64 IN | TEMPERATURE: 97.5 F | WEIGHT: 180.6 LBS | DIASTOLIC BLOOD PRESSURE: 84 MMHG | SYSTOLIC BLOOD PRESSURE: 129 MMHG | OXYGEN SATURATION: 98 % | HEART RATE: 87 BPM | BODY MASS INDEX: 30.83 KG/M2

## 2024-04-12 DIAGNOSIS — R23.3 EASY BRUISING: Primary | ICD-10-CM

## 2024-04-12 DIAGNOSIS — R23.3 EASY BRUISING: ICD-10-CM

## 2024-04-12 DIAGNOSIS — R79.1 ELEVATED PARTIAL THROMBOPLASTIN TIME (PTT): ICD-10-CM

## 2024-04-12 LAB
ALBUMIN SERPL-MCNC: 4.4 G/DL (ref 3.5–5.2)
ALBUMIN/GLOB SERPL: 1.7 G/DL
ALP SERPL-CCNC: 82 U/L (ref 39–117)
ALT SERPL W P-5'-P-CCNC: 23 U/L (ref 1–33)
ANION GAP SERPL CALCULATED.3IONS-SCNC: 12.6 MMOL/L (ref 5–15)
APTT PPP: 33.2 SECONDS (ref 26.5–34.5)
AST SERPL-CCNC: 21 U/L (ref 1–32)
BASOPHILS # BLD AUTO: 0.07 10*3/MM3 (ref 0–0.2)
BASOPHILS NFR BLD AUTO: 1.1 % (ref 0–1.5)
BILIRUB SERPL-MCNC: 0.7 MG/DL (ref 0–1.2)
BUN SERPL-MCNC: 13 MG/DL (ref 6–20)
BUN/CREAT SERPL: 14.9 (ref 7–25)
CALCIUM SPEC-SCNC: 9.4 MG/DL (ref 8.6–10.5)
CHLORIDE SERPL-SCNC: 105 MMOL/L (ref 98–107)
CLOSURE TME COLL+ADP BLD: 89 SECONDS (ref 56–102)
CLOSURE TME COLL+EPINEP BLD: 114 SECONDS (ref 80–184)
CO2 SERPL-SCNC: 25.4 MMOL/L (ref 22–29)
CREAT SERPL-MCNC: 0.87 MG/DL (ref 0.57–1)
CRP SERPL-MCNC: <0.3 MG/DL (ref 0–0.5)
DEPRECATED RDW RBC AUTO: 44.7 FL (ref 37–54)
EGFRCR SERPLBLD CKD-EPI 2021: 83.3 ML/MIN/1.73
EOSINOPHIL # BLD AUTO: 0.15 10*3/MM3 (ref 0–0.4)
EOSINOPHIL NFR BLD AUTO: 2.3 % (ref 0.3–6.2)
ERYTHROCYTE [DISTWIDTH] IN BLOOD BY AUTOMATED COUNT: 12.4 % (ref 12.3–15.4)
FIBRINOGEN PPP-MCNC: 401 MG/DL (ref 173–524)
GLOBULIN UR ELPH-MCNC: 2.6 GM/DL
GLUCOSE SERPL-MCNC: 60 MG/DL (ref 65–99)
HCT VFR BLD AUTO: 43.5 % (ref 34–46.6)
HGB BLD-MCNC: 14.3 G/DL (ref 12–15.9)
IMM GRANULOCYTES # BLD AUTO: 0.02 10*3/MM3 (ref 0–0.05)
IMM GRANULOCYTES NFR BLD AUTO: 0.3 % (ref 0–0.5)
INR PPP: 0.87 (ref 0.9–1.1)
LYMPHOCYTES # BLD AUTO: 2.17 10*3/MM3 (ref 0.7–3.1)
LYMPHOCYTES NFR BLD AUTO: 33.3 % (ref 19.6–45.3)
MCH RBC QN AUTO: 31.9 PG (ref 26.6–33)
MCHC RBC AUTO-ENTMCNC: 32.9 G/DL (ref 31.5–35.7)
MCV RBC AUTO: 97.1 FL (ref 79–97)
MONOCYTES # BLD AUTO: 0.62 10*3/MM3 (ref 0.1–0.9)
MONOCYTES NFR BLD AUTO: 9.5 % (ref 5–12)
NEUTROPHILS NFR BLD AUTO: 3.48 10*3/MM3 (ref 1.7–7)
NEUTROPHILS NFR BLD AUTO: 53.5 % (ref 42.7–76)
NRBC BLD AUTO-RTO: 0 /100 WBC (ref 0–0.2)
PLATELET # BLD AUTO: 277 10*3/MM3 (ref 140–450)
PLATELET FUNCTION: NORMAL
PMV BLD AUTO: 9.6 FL (ref 6–12)
POTASSIUM SERPL-SCNC: 3.7 MMOL/L (ref 3.5–5.2)
PROT SERPL-MCNC: 7 G/DL (ref 6–8.5)
PROTHROMBIN TIME: 12.3 SECONDS (ref 12.1–14.7)
RBC # BLD AUTO: 4.48 10*6/MM3 (ref 3.77–5.28)
SODIUM SERPL-SCNC: 143 MMOL/L (ref 136–145)
TSH SERPL DL<=0.05 MIU/L-ACNC: 1.1 UIU/ML (ref 0.27–4.2)
WBC NRBC COR # BLD AUTO: 6.51 10*3/MM3 (ref 3.4–10.8)

## 2024-04-12 PROCEDURE — 85025 COMPLETE CBC W/AUTO DIFF WBC: CPT | Performed by: INTERNAL MEDICINE

## 2024-04-12 PROCEDURE — 85384 FIBRINOGEN ACTIVITY: CPT | Performed by: INTERNAL MEDICINE

## 2024-04-12 PROCEDURE — 84597 ASSAY OF VITAMIN K: CPT | Performed by: INTERNAL MEDICINE

## 2024-04-12 PROCEDURE — 86140 C-REACTIVE PROTEIN: CPT | Performed by: INTERNAL MEDICINE

## 2024-04-12 PROCEDURE — 82180 ASSAY OF ASCORBIC ACID: CPT | Performed by: INTERNAL MEDICINE

## 2024-04-12 PROCEDURE — 85246 CLOT FACTOR VIII VW ANTIGEN: CPT | Performed by: INTERNAL MEDICINE

## 2024-04-12 PROCEDURE — 85245 CLOT FACTOR VIII VW RISTOCTN: CPT | Performed by: INTERNAL MEDICINE

## 2024-04-12 PROCEDURE — 85240 CLOT FACTOR VIII AHG 1 STAGE: CPT | Performed by: INTERNAL MEDICINE

## 2024-04-12 PROCEDURE — 85576 BLOOD PLATELET AGGREGATION: CPT | Performed by: INTERNAL MEDICINE

## 2024-04-12 PROCEDURE — 80053 COMPREHEN METABOLIC PANEL: CPT | Performed by: INTERNAL MEDICINE

## 2024-04-12 PROCEDURE — 99204 OFFICE O/P NEW MOD 45 MIN: CPT | Performed by: INTERNAL MEDICINE

## 2024-04-12 PROCEDURE — 84443 ASSAY THYROID STIM HORMONE: CPT | Performed by: INTERNAL MEDICINE

## 2024-04-12 PROCEDURE — 85730 THROMBOPLASTIN TIME PARTIAL: CPT | Performed by: INTERNAL MEDICINE

## 2024-04-12 PROCEDURE — 85610 PROTHROMBIN TIME: CPT | Performed by: INTERNAL MEDICINE

## 2024-04-12 RX ORDER — CETIRIZINE HYDROCHLORIDE 5 MG/1
5 TABLET ORAL DAILY
COMMUNITY

## 2024-04-12 NOTE — PROGRESS NOTES
Venipuncture Blood Specimen Collection  Venipuncture performed in right arm by Rodriguez Jung MA with good hemostasis. Patient tolerated the procedure well without complications.   04/12/24   Rodriguez Jung MA

## 2024-04-12 NOTE — TELEPHONE ENCOUNTER
With MD instruction, RN attempted to call patient x 3 to inform her of glucose level on today's lab results. Unfortunately, RN was unable to reach patient or patient's spouse. RN left detailed message stating patient's glucose level was 60 and anything under 70 is considered hypoglycemic. RN encouraged her to drink orange juice or eat something sugary in hopes to increase that glucose level. RN also informed her that if she was fasting at the time of blood work, she does not need to in the future. RN also provided number for patient to call back with any questions or concerns.

## 2024-04-12 NOTE — LETTER
April 12, 2024       No Recipients    Patient: Rona Thomson   YOB: 1975   Date of Visit: 4/12/2024     Dear Jim Martinez DO:       Thank you for referring Rona Thomson to me for evaluation. Below are the relevant portions of my assessment and plan of care.    If you have questions, please do not hesitate to call me. I look forward to following Rona along with you.         Sincerely,        Dorina Zavala MD        CC:   No Recipients    Dorina Zavala MD  04/12/24 1404  Sign when Signing Visit  Rona Thomson  [id number]  1975 4/12/2024      Referring Provider:   Jim Martinez DO    Reason for Consultation:   Easy Bruising  Elevated aPTT    Chief Complaint:  Easy Bruising      History of Present Illness   Rona Thomson is a very pleasant 48 y.o.  female who presents in new consultation at the request of Jim Martinez DO for further management and evaluation of easy bruising.    Ms. Thomson reports having diagnostic laproscopy and bilateral uretero lysis for endometriosis in 2016 and was also undergoing fertility treatment at the time. She went home that same day of procedure but later that day she developed right calf swelling and pain. She was evaluated and treated empirically with Eliquis (two doses) as she was unable to have an US until the next day. Duplex that was obtained was negative for DVT. She has no history significant for thrombosis. She reports undergoing multiple rounds of IVF which were unsuccessful. She did have a ALEM-BSO in 2023 and was started on Lovenox injections twice daily after the procedure however this had to be stopped two weeks into receiving injections due to heavy bleeding. Six months after procedure she started taking black cohash, tumeric, Vitamin C/D/B12, and glucosamine. In fall 2023 she began experiencing increased bruising and erythematous lesions on her extremities. Denies of any bruising on her chest,  abdomen, or back. No issues with bleeding with surgery or dental procedures. She denies of any heavy menstrual cycles.             The following portions of the patient's history were reviewed and updated as appropriate: allergies, current medications, past family history, past medical history, past social history, past surgical history and problem list.      Allergies   Allergen Reactions   • Ultram [Tramadol] Nausea And Vomiting and Headache         Past Medical History:   Diagnosis Date   • Anxiety    • Arthritis of back    • Corneal erosion of right eye    • Degenerative disc disease, cervical    • DVT (deep venous thrombosis) 2016    after diagnostic lap   • Elevated cholesterol    • Endometriosis    • Fracture of wrist 2003    Small bone in right hand   • Fracture, foot 1991    Right foot   • GERD (gastroesophageal reflux disease)    • Heart palpitations     HX OF PER PT   • Low back strain    • Neck pain    • Neck strain    • PONV (postoperative nausea and vomiting)    • Scoliosis 1988    Dx as a child   • Tear of meniscus of knee Torn maniscus    Kicked m by horse- Right leg - just below knee   • Wears contact lenses    • Wears eyeglasses            Past Surgical History:   Procedure Laterality Date   • BASAL CELL CARCINOMA EXCISION  11/21/2019   • BREAST BIOPSY Right 03/30/2022   • CHOLECYSTECTOMY  2014   • COLONOSCOPY  2014    polyps removed   • DIAGNOSTIC LAPAROSCOPY  12/2014    x 2 other in 2005   • DIAGNOSTIC LAPAROSCOPY N/A 07/15/2016    Procedure: ROBOTIC EXCISION OF ENDOMETRIOSIS, diagnostic laproscopy, bilateral uretero lysis;  Surgeon: Angeles Miller MD;  Location:  [facility];  Service:    • ENDOSCOPY  2014    polyps removed from stomach    • TOTAL LAPAROSCOPIC HYSTERECTOMY SALPINGO OOPHORECTOMY N/A 01/19/2023    Procedure: TOTAL LAPAROSCOPIC HYSTERECTOMY BILATERAL SALPINGO OOPHORECTOMY WITH DAVINCI ROBOT;  Surgeon: Angeles Miller MD;  Location:  [facility];  Service: Robotics - DaVinci;   Laterality: N/A;   • TRIGGER POINT INJECTION           Social History     Socioeconomic History   • Marital status:    Tobacco Use   • Smoking status: Former     Current packs/day: 0.00     Average packs/day: 1 pack/day for 23.6 years (23.6 ttl pk-yrs)     Types: Cigarettes     Start date: 1995     Quit date:      Years since quittin.2     Passive exposure: Never   • Smokeless tobacco: Never   • Tobacco comments:     so glad i was able to quit   Vaping Use   • Vaping status: Never Used   Substance and Sexual Activity   • Alcohol use: No   • Drug use: No   • Sexual activity: Yes     Partners: Male     Birth control/protection: Hysterectomy   She has two adopted children. She was working as a hospice nurse for 15 years. No alcohol use, previous tobacco use.         Family History   Problem Relation Age of Onset   • Cancer Father 62        Colon CA / DX    • Colon cancer Father    • Hypertension Father    • Colon cancer Maternal Grandmother    • Breast cancer Maternal Aunt 30   • Breast cancer Cousin 20   • Breast cancer Mother Late 50s/early 60s   • Cancer Mother         Breast CA / DX 2019   • Ovarian cancer Neg Hx    Maternal Grandmother - uterine cancer, lymphoma in her 90s          Current Outpatient Medications:   •  cetirizine (zyrTEC) 5 MG tablet, Take 1 tablet by mouth Daily., Disp: , Rfl:   •  DULoxetine (CYMBALTA) 60 MG capsule, Take 1 capsule by mouth Daily., Disp: 90 capsule, Rfl: 2  •  multivitamin with minerals tablet tablet, Take 1 tablet by mouth Daily., Disp: , Rfl:   •  pantoprazole (PROTONIX) 40 MG EC tablet, Take 1 tablet by mouth Daily., Disp: 90 tablet, Rfl: 2  •  rosuvastatin (CRESTOR) 10 MG tablet, Take 1 tablet by mouth Daily., Disp: 90 tablet, Rfl: 2  •  TiZANidine (ZANAFLEX) 6 MG capsule, Take 1 capsule by mouth 2 (Two) Times a Day As Needed for Muscle Spasms., Disp: 180 capsule, Rfl: 1  Hydroxyzine           Review of Systems  Constitutional: No fever, chills,  night sweats, positive weight gain.   HEENT:  No headaches, vision changes or hearing changes, no sinus drainage, sore throat.   Cardiovascular:  No palpitations, chest pain, syncopal episodes or edema.   Pulmonary:  No shortness of breath, hemoptysis, or cough.   Gastrointestinal:  No nausea or vomiting. Chronic constipation, no diarrhea. Positive abdominal pain with hiatal hernia. No melena or hematochezia.   Genitourinary:  No hematuria, or changes in urination.   Musculoskeletal:  No pain, or joint problems.   Skin: No rashes or pruritus.   Endocrine:  Positive hot flashes, no chills   Hematologic:  No history of free bleeding, spontaneous bleeding or clotting problems. No lymphadenopathy.  Positive easy bruising.  Immunologic:  Seasonal allergies, no frequent infections.   Neurologic: No numbness, tingling, or weakness.   Psychiatric:  Positive anxiety, no depression.           Physical Exam  Vital Signs: These were reviewed and listed as per patient’s electronic medical chart  Vitals:    04/12/24 1049   BP: 129/84   Pulse: 87   Resp: 18   Temp: 97.5 °F (36.4 °C)   SpO2: 98%     General: Awake, alert and oriented, in no distress  HEENT: Head is atraumatic, normocephalic, extraocular movements full, no scleral icterus  Neck: supple, no jvd, lymphadenopathy or masses  Cardiovascular: regular rate and rhythm without murmurs, rubs or gallops  Pulmonary: non-labored, clear to auscultation bilaterally, no wheezing  Abdomen: soft, non-tender, non-distended, normal active bowel sounds present, no organomegaly  Extremities: No clubbing, cyanosis or edema  Lymph: No cervical, supraclavicular adenopathy  Neurologic: Mental status as above, alert, awake and oriented, grossly non-focal exam  Skin: warm, dry, intact        Pain Score:  Pain Score    04/12/24 1049   PainSc: 0-No pain           PHQ-Score Total:  PHQ-9 Total Score:          LABS/STUDIES:  Lab on 04/12/2024   Component Date Value   • Protime 04/12/2024 12.3    •  INR 04/12/2024 0.87 (L)    • PTT 04/12/2024 33.2    • Fibrinogen 04/12/2024 401    • WBC 04/12/2024 6.51    • RBC 04/12/2024 4.48    • Hemoglobin 04/12/2024 14.3    • Hematocrit 04/12/2024 43.5    • MCV 04/12/2024 97.1 (H)    • MCH 04/12/2024 31.9    • MCHC 04/12/2024 32.9    • RDW 04/12/2024 12.4    • RDW-SD 04/12/2024 44.7    • MPV 04/12/2024 9.6    • Platelets 04/12/2024 277    • Neutrophil % 04/12/2024 53.5    • Lymphocyte % 04/12/2024 33.3    • Monocyte % 04/12/2024 9.5    • Eosinophil % 04/12/2024 2.3    • Basophil % 04/12/2024 1.1    • Immature Grans % 04/12/2024 0.3    • Neutrophils, Absolute 04/12/2024 3.48    • Lymphocytes, Absolute 04/12/2024 2.17    • Monocytes, Absolute 04/12/2024 0.62    • Eosinophils, Absolute 04/12/2024 0.15    • Basophils, Absolute 04/12/2024 0.07    • Immature Grans, Absolute 04/12/2024 0.02    • nRBC 04/12/2024 0.0    Lab on 03/21/2024   Component Date Value   • Pathology Review 03/21/2024 Yes    • Protime 03/21/2024 12.5    • INR 03/21/2024 0.88 (L)    • PTT 03/21/2024 35.6 (H)    • WBC 03/21/2024 7.01    • RBC 03/21/2024 4.39    • Hemoglobin 03/21/2024 13.8    • Hematocrit 03/21/2024 41.6    • MCV 03/21/2024 94.8    • MCH 03/21/2024 31.4    • MCHC 03/21/2024 33.2    • RDW 03/21/2024 12.1 (L)    • RDW-SD 03/21/2024 42.2    • MPV 03/21/2024 11.6    • Platelets 03/21/2024 316    • Neutrophil % 03/21/2024 54.2    • Lymphocyte % 03/21/2024 33.4    • Monocyte % 03/21/2024 9.3    • Eosinophil % 03/21/2024 1.7    • Basophil % 03/21/2024 1.3    • Immature Grans % 03/21/2024 0.1    • Neutrophils, Absolute 03/21/2024 3.80    • Lymphocytes, Absolute 03/21/2024 2.34    • Monocytes, Absolute 03/21/2024 0.65    • Eosinophils, Absolute 03/21/2024 0.12    • Basophils, Absolute 03/21/2024 0.09    • Immature Grans, Absolute 03/21/2024 0.01    • nRBC 03/21/2024 0.0    • Final Diagnosis 03/21/2024                      Value:This result contains rich text formatting which cannot be displayed here.    • Case Report 03/21/2024                      Value:Surgical Pathology Report                         Case: [id number]                                  Authorizing Provider:  Jim Martinez DO    Collected:           03/21/2024 01:52 PM          Ordering Location:     [facility]     Received:            03/22/2024 02:30 AM                                 [facility]                                                        Pathologist:           Odalys Bender MD                                                          Specimen:    Blood, Venous, peripheral blood smear                                                           PATHOLOGY:  10/15/21       3/30/22      1/19/23      3/21/24          Assessment & Plan   Rona Thomson is a very pleasant 48 y.o.  female who presents in new consultation at the request of Jim Martinez DO for further management and evaluation of easy bruising    Easy bruising  Elevated PTT  - Complete blood count repeated today shows a normal total WBC, hemoglobin, hematocrit, and platelet count. Coagulation markers show normal PT/PTT. Peripheral smear to assess platelet morphology is pending.  - To further evaluate obtained a TSH which is normal. Fibrinogen normal. Platelet function analysis normal. Vitamin K and C levels are pending.   - Pictures that patient had brought with her were reviewed and some lesions appear to be more vasculitic rather than bruising. She notes that she was evaluated by dermatology.  - I did recommend that she stop tumeric pills as well as black cohosh. She notes that she stopped this one week prior to be evaluated for labs with Dr. Martinez. Black cohosh can cause bleeding and can also cause weight gain, stomach upset, headaches, rash, liver damage. There are also a couple of cases of cutaneous vasculitis linked to black cohosh noted in the journal of [employer] dermat[employer].  - Will await work up and obtain  EMMETT, RF, and ANCA studies at her next visit. If recurs she should be re-evaluated by dermatology and consider biopsy.  - Von Willebrand panel pending.    Hypoglycemia  - Her glucose is low today and may be contributing to her fatigue. She was called with these results so that she can drink OJ/fluids or eat to increase glucose.     ACO / CHERIE/Other  Quality measures  -  Rona Thomson  reports that she quit smoking about 5 years ago. Her smoking use included cigarettes. She started smoking about 28 years ago. She has a 23.6 pack-year smoking history. She has never been exposed to tobacco smoke. She has never used smokeless tobacco.   -  Rona Thomson received 2023 flu vaccine.  -  Rona Thomson reports a pain score of 0.  Given her pain assessment as noted, treatment options were discussed and the following options were decided upon as a follow-up plan to address the patient's pain: continuation of current treatment plan for pain.  -  Current outpatient and discharge medications have been reconciled for the patient.  Reviewed by: Dorina Zavala MD      I will have the patient return in follow up appointment to review test results in two weeks with labs (RF, EMMETT, ANCA, BMP). She understands that should she have any questions or concerns prior to her appointment she should give us a call at any time and I would be happy to see her sooner. It was a pleasure to see this patient in clinic today, thank you for allowing me to participate in the care of this patient.      Dorina Zavala MD   04/12/24   11:22 EDT

## 2024-04-15 ENCOUNTER — PATIENT ROUNDING (BHMG ONLY) (OUTPATIENT)
Dept: ONCOLOGY | Facility: CLINIC | Age: 49
End: 2024-04-15
Payer: COMMERCIAL

## 2024-04-15 LAB
FACT VIII ACT/NOR PPP: 83 % (ref 56–140)
PATH INTERP BLD-IMP: NORMAL
REF LAB TEST METHOD: NORMAL
VWF AG ACT/NOR PPP IA: 79 % (ref 50–200)
VWF:RCO ACT/NOR PPP PL AGG: 68 % (ref 50–200)

## 2024-04-16 ENCOUNTER — TRANSCRIBE ORDERS (OUTPATIENT)
Dept: ADMINISTRATIVE | Facility: HOSPITAL | Age: 49
End: 2024-04-16
Payer: COMMERCIAL

## 2024-04-16 DIAGNOSIS — Z12.31 VISIT FOR SCREENING MAMMOGRAM: Primary | ICD-10-CM

## 2024-04-19 LAB
PHYTONADIONE SERPL-MCNC: 0.65 NG/ML (ref 0.1–2.2)
VIT C SERPL-MCNC: 0.8 MG/DL (ref 0.4–2)

## 2024-04-24 DIAGNOSIS — R23.3 EASY BRUISING: ICD-10-CM

## 2024-04-24 DIAGNOSIS — R79.1 ELEVATED PARTIAL THROMBOPLASTIN TIME (PTT): Primary | ICD-10-CM

## 2024-04-24 NOTE — PROGRESS NOTES
Rona Thomson  [id number]  1975 4/30/2024      Referring Provider:   Jim Martinez DO    Reason for Follow Up:   Easy Bruising  Elevated aPTT    Chief Complaint:  Easy Bruising      History of Present Illness   Rona Thomson is a very pleasant 48 y.o.  female who presents in follow up appointment for further management and evaluation of easy bruising.    Ms. Thomson reports having diagnostic laproscopy and bilateral uretero lysis for endometriosis in 2016 and was also undergoing fertility treatment at the time. She went home that same day of procedure but later that day she developed right calf swelling and pain. She was evaluated and treated empirically with Eliquis (two doses) as she was unable to have an US until the next day. Duplex that was obtained was negative for DVT. She has no history significant for thrombosis. She reports undergoing multiple rounds of IVF which were unsuccessful. She did have a ALEM-BSO in 2023 and was started on Lovenox injections twice daily after the procedure however this had to be stopped two weeks into receiving injections due to heavy bleeding. Six months after procedure she started taking black cohash, tumeric, Vitamin C/D/B12, and glucosamine. In fall 2023 she began experiencing increased bruising and erythematous lesions on her extremities. Denies of any bruising on her chest, abdomen, or back. No issues with bleeding with surgery or dental procedures. She denies of any heavy menstrual cycles.     Interim History:  She is no longer taking tumeric or black cohash but reports that black cohash helped with her hot flashes. She also reports feeling better (glucose found to be low) and without bruising since her last visit. She has gotten a glucose monitor to monitor further at home.         The following portions of the patient's history were reviewed and updated as appropriate: allergies, current medications, past family history, past medical history,  past social history, past surgical history and problem list.      Allergies   Allergen Reactions    Ultram [Tramadol] Nausea And Vomiting and Headache         Past Medical History:   Diagnosis Date    Anxiety     Arthritis of back     Corneal erosion of right eye     Degenerative disc disease, cervical     DVT (deep venous thrombosis) 2016    after diagnostic lap    Elevated cholesterol     Endometriosis     Fracture of wrist 2003    Small bone in right hand    Fracture, foot 1991    Right foot    GERD (gastroesophageal reflux disease)     Heart palpitations     HX OF PER PT    Low back strain     Neck pain     Neck strain     PONV (postoperative nausea and vomiting)     Scoliosis 1988    Dx as a child    Tear of meniscus of knee Torn maniscus    Kicked m by horse- Right leg - just below knee    Wears contact lenses     Wears eyeglasses            Past Surgical History:   Procedure Laterality Date    BASAL CELL CARCINOMA EXCISION  11/21/2019    BREAST BIOPSY Right 03/30/2022    CHOLECYSTECTOMY  2014    COLONOSCOPY  2014    polyps removed    DIAGNOSTIC LAPAROSCOPY  12/2014    x 2 other in 2005    DIAGNOSTIC LAPAROSCOPY N/A 07/15/2016    Procedure: ROBOTIC EXCISION OF ENDOMETRIOSIS, diagnostic laproscopy, bilateral uretero lysis;  Surgeon: Angeles Miller MD;  Location:  [facility];  Service:     ENDOSCOPY  2014    polyps removed from stomach     TOTAL LAPAROSCOPIC HYSTERECTOMY SALPINGO OOPHORECTOMY N/A 01/19/2023    Procedure: TOTAL LAPAROSCOPIC HYSTERECTOMY BILATERAL SALPINGO OOPHORECTOMY WITH DAVINCI ROBOT;  Surgeon: Angeles Miller MD;  Location:  [facility];  Service: Robotics - DaVinci;  Laterality: N/A;    TRIGGER POINT INJECTION           Social History     Socioeconomic History    Marital status:    Tobacco Use    Smoking status: Former     Current packs/day: 0.00     Average packs/day: 1 pack/day for 23.6 years (23.6 ttl pk-yrs)     Types: Cigarettes     Start date: 6/1/1995     Quit date: 2019      Years since quittin.3     Passive exposure: Never    Smokeless tobacco: Never    Tobacco comments:     so glad i was able to quit   Vaping Use    Vaping status: Never Used   Substance and Sexual Activity    Alcohol use: No    Drug use: No    Sexual activity: Yes     Partners: Male     Birth control/protection: Hysterectomy   She has two adopted children. She was working as a hospice nurse for 15 years. No alcohol use, previous tobacco use.         Family History   Problem Relation Age of Onset    Cancer Father 62        Colon CA / DX     Colon cancer Father     Hypertension Father     Colon cancer Maternal Grandmother     Breast cancer Maternal Aunt 30    Breast cancer Cousin 20    Breast cancer Mother Late 50s/early 60s    Cancer Mother         Breast CA / DX 2019    Ovarian cancer Neg Hx    Maternal Grandmother - uterine cancer, lymphoma in her 90s          Current Outpatient Medications:     cetirizine (zyrTEC) 5 MG tablet, Take 1 tablet by mouth Daily., Disp: , Rfl:     DULoxetine (CYMBALTA) 60 MG capsule, Take 1 capsule by mouth Daily., Disp: 90 capsule, Rfl: 2    multivitamin with minerals tablet tablet, Take 1 tablet by mouth Daily., Disp: , Rfl:     pantoprazole (PROTONIX) 40 MG EC tablet, Take 1 tablet by mouth Daily., Disp: 90 tablet, Rfl: 2    rosuvastatin (CRESTOR) 10 MG tablet, Take 1 tablet by mouth Daily., Disp: 90 tablet, Rfl: 2    TiZANidine (ZANAFLEX) 6 MG capsule, Take 1 capsule by mouth 2 (Two) Times a Day As Needed for Muscle Spasms., Disp: 180 capsule, Rfl: 1  Hydroxyzine           Review of Systems  Pertinent positives are listed as per history of present of illness, all other systems reviewed and are negative.          Physical Exam  Vital Signs: These were reviewed and listed as per patient’s electronic medical chart  Vitals:    24 1022   BP: 116/74   Pulse: 96   Resp: 18   Temp: 97.5 °F (36.4 °C)   SpO2: 96%     General: Patient is awake, alert, and in no acute  distress.  Head: Normocephalic, atraumatic  Eyes: EOMI. Conjunctivae and sclerae normal.  Ears: Ears appear intact with no abnormalities noted.   Neck: Trachea midline. No obvious JVD.  Lungs: Respirations appear to be regular, even and unlabored with no signs of respiratory distress. No audible wheezing.  Abdomen: No obvious abdominal distension.  MS: Muscle tone appears normal. No gross deformities.  Extremities: No clubbing, cyanosis or edema noted.  Skin: No visible bleeding, bruising, or rash.  Neurologic: Alert and oriented x3. No gross focal deficits.        Pain Score:  Pain Score    24 1022   PainSc: 0-No pain           PHQ-Score Total:  PHQ-9 Total Score:          LABS/STUDIES:  Lab on 2024   Component Date Value    Collagen/ADP 2024 89     Collagen/Epinephrine 2024 114     Platelet Function 2024 Interpretation     Protime 2024 12.3     INR 2024 0.87 (L)     PTT 2024 33.2     Fibrinogen 2024 401     Reference Lab Report 2024                      Value:[facility]  [address] [place] [address]  Phone: [phone] or [phone]  Fax: [phone]  Marvel Marie M.D., Medical Director    PATIENT NAME                                 LABORATORY NO.  JORGE MARSH.                        [id number]  [id number]  [facility]                     AGE                SEX     SSN            CLIENT REF #  ([place])                                       48       1975   F       [id number]    [id number]  [address]                                 REQUESTING M.D.       ATTENDING M.D..        [place] [address]                               LYNNETTE JONES SEAN SWATI    DATE COLLECTED        DATE RECEIVED          DATE REPORTED  2024            2024             04/15/2024    DIAGNOSIS:  PERIPHERAL SMEAR  Red blood cell macrocytosis without anemia.   No significant increase in  schistocytes population.  Normal WBC count and                           differential. Granulocytes show normal morphology and  no circulating blasts identified.  Adequate platelets      CLINICAL HISTORY:  Elevated partial thromboplastin time (PTT), easy bruising    CLINICAL LABORATORY DATA  WBC        6.51 x10/3/-L    RDW         12.4%  HGB        14.3 g/dl        PLT         277 x10/3/-L  HCT        43.5%            LYMPHS      33.3%  MCV        97.1fL           NEUTS       53.5%  MONO        9.5%  EOS         2.3%  BASO        1.1%    PERIPHERAL SMEAR MICROSCOPIC DESCRIPTION:  Mojica stained smears are reviewed microscopically. See diagnosis for details.    Professional interpretation rendered by Hannah Justice M.D., MPH at [facility], [address] [place] [address].    GROSS DESCRIPTION:  RECEIVED 2 SLIDES FOR REVIEW- LM 4/12/24    REVIEWED, DIAGNOSED AND ELECTRONICALLY  SIGNED BY:    Hannah Justice M.D., MPH  CPT CODES:        55031      TSH 04/12/2024 1.100     Factor VIII Activity 04/12/2024 83     Von Willebrand Ag 04/12/2024 79     VWF Activity 04/12/2024 68     Vitamin C 04/12/2024 0.8     Vitamin K 04/12/2024 0.65     WBC 04/12/2024 6.51     RBC 04/12/2024 4.48     Hemoglobin 04/12/2024 14.3     Hematocrit 04/12/2024 43.5     MCV 04/12/2024 97.1 (H)     MCH 04/12/2024 31.9     MCHC 04/12/2024 32.9     RDW 04/12/2024 12.4     RDW-SD 04/12/2024 44.7     MPV 04/12/2024 9.6     Platelets 04/12/2024 277     Neutrophil % 04/12/2024 53.5     Lymphocyte % 04/12/2024 33.3     Monocyte % 04/12/2024 9.5     Eosinophil % 04/12/2024 2.3     Basophil % 04/12/2024 1.1     Immature Grans % 04/12/2024 0.3     Neutrophils, Absolute 04/12/2024 3.48     Lymphocytes, Absolute 04/12/2024 2.17     Monocytes, Absolute 04/12/2024 0.62     Eosinophils, Absolute 04/12/2024 0.15     Basophils, Absolute 04/12/2024 0.07     Immature Grans, Absolute 04/12/2024 0.02     nRBC 04/12/2024 0.0     von  Willebrand Interp 04/12/2024 Note    Consult on 04/12/2024   Component Date Value    Glucose 04/12/2024 60 (L)     BUN 04/12/2024 13     Creatinine 04/12/2024 0.87     Sodium 04/12/2024 143     Potassium 04/12/2024 3.7     Chloride 04/12/2024 105     CO2 04/12/2024 25.4     Calcium 04/12/2024 9.4     Total Protein 04/12/2024 7.0     Albumin 04/12/2024 4.4     ALT (SGPT) 04/12/2024 23     AST (SGOT) 04/12/2024 21     Alkaline Phosphatase 04/12/2024 82     Total Bilirubin 04/12/2024 0.7     Globulin 04/12/2024 2.6     A/G Ratio 04/12/2024 1.7     BUN/Creatinine Ratio 04/12/2024 14.9     Anion Gap 04/12/2024 12.6     eGFR 04/12/2024 83.3     C-Reactive Protein 04/12/2024 <0.30    Lab on 03/21/2024   Component Date Value    Pathology Review 03/21/2024 Yes     Protime 03/21/2024 12.5     INR 03/21/2024 0.88 (L)     PTT 03/21/2024 35.6 (H)     WBC 03/21/2024 7.01     RBC 03/21/2024 4.39     Hemoglobin 03/21/2024 13.8     Hematocrit 03/21/2024 41.6     MCV 03/21/2024 94.8     MCH 03/21/2024 31.4     MCHC 03/21/2024 33.2     RDW 03/21/2024 12.1 (L)     RDW-SD 03/21/2024 42.2     MPV 03/21/2024 11.6     Platelets 03/21/2024 316     Neutrophil % 03/21/2024 54.2     Lymphocyte % 03/21/2024 33.4     Monocyte % 03/21/2024 9.3     Eosinophil % 03/21/2024 1.7     Basophil % 03/21/2024 1.3     Immature Grans % 03/21/2024 0.1     Neutrophils, Absolute 03/21/2024 3.80     Lymphocytes, Absolute 03/21/2024 2.34     Monocytes, Absolute 03/21/2024 0.65     Eosinophils, Absolute 03/21/2024 0.12     Basophils, Absolute 03/21/2024 0.09     Immature Grans, Absolute 03/21/2024 0.01     nRBC 03/21/2024 0.0     Final Diagnosis 03/21/2024                      Value:This result contains rich text formatting which cannot be displayed here.    Case Report 03/21/2024                      Value:Surgical Pathology Report                         Case: [id number]                                  Authorizing Provider:  Jim Martinez DO     Collected:           03/21/2024 01:52 PM          Ordering Location:     [facility]     Received:            03/22/2024 02:30 AM                                 [facility]                                                        Pathologist:           Odalys Bender MD                                                          Specimen:    Blood, Venous, peripheral blood smear                                                           PATHOLOGY:  10/15/21       3/30/22      1/19/23      3/21/24      4/12/24            Assessment & Plan   Rona Thomson is a very pleasant 48 y.o.  female who presents in follow up appointment for further management and evaluation of easy bruising.    Easy bruising  Elevated PTT  - Complete blood count repeated shows a normal total WBC, hemoglobin, hematocrit, and platelet count. Coagulation markers show normal PT/PTT. Peripheral smear to assess platelet morphology shows normal platelets.  - To further evaluate obtained a TSH which is normal. Fibrinogen normal. Platelet function analysis normal. Vitamin K and C levels are within normal limits and no abnormalities seen in the Von Willebrand panel.  - Pictures that patient had brought with her were reviewed and some lesions appear to be more vasculitic rather than bruising. She notes that she was evaluated by dermatology.  - I did recommend that she stop tumeric pills as well as black cohosh. She notes that she stopped this one week prior to be evaluated for labs with Dr. Martinez. Black cohosh can cause bleeding and can also cause weight gain, stomach upset, headaches, rash, liver damage which I discussed with her today. There are also a couple of cases of cutaneous vasculitis linked to black cohosh noted in the journal of american academy of dermatology.  - EMMETT, RF, and ANCA to further evaluate are pending. If recurs she should be re-evaluated by dermatology and consider biopsy.    Hypoglycemia  - Her  glucose is low today and may be contributing to her fatigue. She was called with these results so that she can drink OJ/fluids or eat to increase glucose. Glucose is normal today and her symptoms have resolved.    ACO / CHERIE/Other  Quality measures  -  Rona Thomson  reports that she quit smoking about 5 years ago. Her smoking use included cigarettes. She started smoking about 28 years ago. She has a 23.6 pack-year smoking history. She has never been exposed to tobacco smoke. She has never used smokeless tobacco.   -  Rona Thomson received 2023 flu vaccine.  -  Rona Thomson reports a pain score of 0.  Given her pain assessment as noted, treatment options were discussed and the following options were decided upon as a follow-up plan to address the patient's pain: continuation of current treatment plan for pain.  -  Current outpatient and discharge medications have been reconciled for the patient.  Reviewed by: Dorina Zavala MD      I will have the patient return in follow up on an as needed basis as her symptoms have improved. She understands that should she have any concerns in the future, she should give us a call at any time and I would be happy to re-evaluate her. It was a pleasure to see this patient in clinic today, thank you for allowing me to participate in the care of this patient.    Dorina Zavala MD   04/30/24   10:53 EDT

## 2024-04-30 ENCOUNTER — LAB (OUTPATIENT)
Dept: ONCOLOGY | Facility: CLINIC | Age: 49
End: 2024-04-30
Payer: OTHER GOVERNMENT

## 2024-04-30 ENCOUNTER — OFFICE VISIT (OUTPATIENT)
Dept: ONCOLOGY | Facility: CLINIC | Age: 49
End: 2024-04-30
Payer: OTHER GOVERNMENT

## 2024-04-30 ENCOUNTER — HOSPITAL ENCOUNTER (OUTPATIENT)
Facility: HOSPITAL | Age: 49
Discharge: HOME | End: 2024-04-30
Admitting: OBSTETRICS & GYNECOLOGY
Payer: OTHER GOVERNMENT

## 2024-04-30 VITALS
DIASTOLIC BLOOD PRESSURE: 74 MMHG | TEMPERATURE: 97.5 F | OXYGEN SATURATION: 96 % | BODY MASS INDEX: 30.77 KG/M2 | HEIGHT: 64 IN | HEART RATE: 96 BPM | SYSTOLIC BLOOD PRESSURE: 116 MMHG | RESPIRATION RATE: 18 BRPM | WEIGHT: 180.2 LBS

## 2024-04-30 DIAGNOSIS — R23.3 EASY BRUISING: Primary | ICD-10-CM

## 2024-04-30 DIAGNOSIS — Z12.31 VISIT FOR SCREENING MAMMOGRAM: ICD-10-CM

## 2024-04-30 DIAGNOSIS — R79.1 ELEVATED PARTIAL THROMBOPLASTIN TIME (PTT): ICD-10-CM

## 2024-04-30 DIAGNOSIS — R23.3 EASY BRUISING: ICD-10-CM

## 2024-04-30 LAB
ANION GAP SERPL CALCULATED.3IONS-SCNC: 9.7 MMOL/L (ref 5–15)
BUN SERPL-MCNC: 18 MG/DL (ref 6–20)
BUN/CREAT SERPL: 25 (ref 7–25)
CALCIUM SPEC-SCNC: 9.3 MG/DL (ref 8.6–10.5)
CHLORIDE SERPL-SCNC: 104 MMOL/L (ref 98–107)
CHROMATIN AB SERPL-ACNC: 10.5 IU/ML (ref 0–14)
CO2 SERPL-SCNC: 28.3 MMOL/L (ref 22–29)
CREAT SERPL-MCNC: 0.72 MG/DL (ref 0.57–1)
EGFRCR SERPLBLD CKD-EPI 2021: 104.6 ML/MIN/1.73
GLUCOSE SERPL-MCNC: 87 MG/DL (ref 65–99)
POTASSIUM SERPL-SCNC: 3.8 MMOL/L (ref 3.5–5.2)
SODIUM SERPL-SCNC: 142 MMOL/L (ref 136–145)

## 2024-04-30 PROCEDURE — 77063 BREAST TOMOSYNTHESIS BI: CPT

## 2024-04-30 PROCEDURE — 77063 BREAST TOMOSYNTHESIS BI: CPT | Performed by: RADIOLOGY

## 2024-04-30 PROCEDURE — 99214 OFFICE O/P EST MOD 30 MIN: CPT | Performed by: INTERNAL MEDICINE

## 2024-04-30 PROCEDURE — 80048 BASIC METABOLIC PNL TOTAL CA: CPT | Performed by: INTERNAL MEDICINE

## 2024-04-30 PROCEDURE — 86037 ANCA TITER EACH ANTIBODY: CPT | Performed by: INTERNAL MEDICINE

## 2024-04-30 PROCEDURE — 83516 IMMUNOASSAY NONANTIBODY: CPT | Performed by: INTERNAL MEDICINE

## 2024-04-30 PROCEDURE — 77067 SCR MAMMO BI INCL CAD: CPT

## 2024-04-30 PROCEDURE — 36415 COLL VENOUS BLD VENIPUNCTURE: CPT | Performed by: INTERNAL MEDICINE

## 2024-04-30 PROCEDURE — 77067 SCR MAMMO BI INCL CAD: CPT | Performed by: RADIOLOGY

## 2024-04-30 PROCEDURE — 86431 RHEUMATOID FACTOR QUANT: CPT | Performed by: INTERNAL MEDICINE

## 2024-04-30 PROCEDURE — 86038 ANTINUCLEAR ANTIBODIES: CPT | Performed by: INTERNAL MEDICINE

## 2024-04-30 NOTE — PROGRESS NOTES
Venipuncture Blood Specimen Collection  Venipuncture performed in Right arm by Zunilda Mcclelland MA with good hemostasis. Patient tolerated the procedure well without complications.   04/30/24   Zunilda Mcclelland MA

## 2024-05-01 LAB — ANA SER QL: NEGATIVE

## 2024-05-02 LAB
C-ANCA TITR SER IF: NORMAL TITER
MYELOPEROXIDASE AB SER IA-ACNC: <0.2 UNITS (ref 0–0.9)
P-ANCA ATYPICAL TITR SER IF: NORMAL TITER
P-ANCA TITR SER IF: NORMAL TITER
PROTEINASE3 AB SER IA-ACNC: <0.2 UNITS (ref 0–0.9)

## 2024-08-15 ENCOUNTER — OFFICE VISIT (OUTPATIENT)
Dept: INTERNAL MEDICINE | Facility: CLINIC | Age: 49
End: 2024-08-15
Payer: OTHER GOVERNMENT

## 2024-08-15 VITALS
OXYGEN SATURATION: 98 % | SYSTOLIC BLOOD PRESSURE: 124 MMHG | HEIGHT: 64 IN | DIASTOLIC BLOOD PRESSURE: 74 MMHG | HEART RATE: 77 BPM | WEIGHT: 175 LBS | TEMPERATURE: 98.1 F | BODY MASS INDEX: 29.88 KG/M2

## 2024-08-15 DIAGNOSIS — E55.9 VITAMIN D DEFICIENCY: ICD-10-CM

## 2024-08-15 DIAGNOSIS — M54.2 NECK PAIN, BILATERAL: ICD-10-CM

## 2024-08-15 DIAGNOSIS — M54.50 CHRONIC BILATERAL LOW BACK PAIN WITHOUT SCIATICA: ICD-10-CM

## 2024-08-15 DIAGNOSIS — R53.82 CHRONIC FATIGUE: ICD-10-CM

## 2024-08-15 DIAGNOSIS — G89.28 CHRONIC PAIN AFTER HYSTERECTOMY: ICD-10-CM

## 2024-08-15 DIAGNOSIS — M25.50 POLYARTHRALGIA: ICD-10-CM

## 2024-08-15 DIAGNOSIS — Z00.00 ANNUAL PHYSICAL EXAM: Primary | ICD-10-CM

## 2024-08-15 DIAGNOSIS — T14.8XXA BRUISING: ICD-10-CM

## 2024-08-15 DIAGNOSIS — F41.9 ANXIETY: ICD-10-CM

## 2024-08-15 DIAGNOSIS — M79.10 MYALGIA: ICD-10-CM

## 2024-08-15 DIAGNOSIS — G89.29 CHRONIC BILATERAL LOW BACK PAIN WITHOUT SCIATICA: ICD-10-CM

## 2024-08-15 DIAGNOSIS — E78.01 FAMILIAL HYPERCHOLESTEROLEMIA: ICD-10-CM

## 2024-08-15 DIAGNOSIS — M79.18 MYOFASCIAL MUSCLE PAIN: ICD-10-CM

## 2024-08-15 DIAGNOSIS — L98.9 SKIN LESIONS: ICD-10-CM

## 2024-08-15 DIAGNOSIS — Z90.710 CHRONIC PAIN AFTER HYSTERECTOMY: ICD-10-CM

## 2024-08-15 DIAGNOSIS — Z76.89 ENCOUNTER TO ESTABLISH CARE: ICD-10-CM

## 2024-08-15 RX ORDER — DULOXETIN HYDROCHLORIDE 20 MG/1
CAPSULE, DELAYED RELEASE ORAL
Qty: 24 CAPSULE | Refills: 0 | Status: SHIPPED | OUTPATIENT
Start: 2024-08-15 | End: 2024-09-05

## 2024-08-15 NOTE — PROGRESS NOTES
Female Physical Note      Date: 08/15/2024   Patient Name: Rona Thomson  : 1975   MRN: [id number]     Chief Complaint:    Chief Complaint   Patient presents with    Establish Care     With Aura today. Would like to wean off of Cymbalta due to side effects of bruising.       History of Present Illness: Rona Thomson is a 49 y.o. female is here to establish care, physical exam, discuss tapering off of Cymbalta.  History of Present Illness  She has not fasted today for labs but requesting I put them in for her to obtain on another day.     She underwent a hysterectomy in 2023, performed by Dr. Miller, and started experiencing significant overall body pain a few months afterwards.  Hysterectomy was done due to endometriosis.  She thought it was due to low estrogen, but Dr. Miller would not prescribe estrogen because she has a history of DVT and at risk for blood clots.  After discussion, they decided to initiate Cymbalta.  Once getting up to the 60 mg dosage, patient had relief in pain, but she started bruising last fall pretty significantly.  She also developed pruritic lesions with scabbing on bilateral arms, also had these lesions on her left leg that have healed.  She was referred to hematologist which did a lab workup and everything was normal.  She was told to discontinue use of black cohosh for hot flashes and turmeric that she was taking.  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  She has been off of these supplements since April and still having the skin lesions and bruising.  She was informed to get skin biopsies of the lesions on her arms but she has not followed up with the dermatologist ([facility]).  She has tried amitriptyline for pain but it caused significant drowsiness the next day.  She has young children and this makes it difficult for her to take medications that make her sleepy.  She does have Zanaflex 6 mg at  home to take when pain is at its worst.  It also causes her drowsiness so she does not take it very often.  Of note she does have a family history of lupus in aunt.  She has been told for over 20 years that she possibly has fibromyalgia.  She has never saw a rheumatologist for an evaluation.  No excessive bleeding.  She bleeds rectally sometimes from hemorrhoids but not often.  No vaginal bleeding, melena, hematemesis.  She experiences widespread pain and stiffness, particularly in her toes and fingers, and takes Aleve nightly and in the morning for relief. She has a history of back issues and regularly receives deep tissue massages. She also has three bulging discs in her cervical spine and she thinks she has brachioradial pruritus.     She gets regular colonoscopies due to family history of colon cancer in father, he passed away from this.  She gets regular mammograms due to breast cancer history in mother, her mother is a breast cancer survivor.  Patient states she was tested for BRCA gene.  She was getting breast MRI alternating with mammogram every 6 months but they changed her to mammogram yearly.  She has had biopsies before in the past that were normal.    She takes Protonix due to GERD which is stable.    Takes Crestor for hyperlipidemia.    All screenings and vaccinations were reviewed  She has an eye doctor and dentist she sees regularly  Former smoker quit in 2019 with a 25.6-pack-year history.  No alcohol or substance use.  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  She does have some health anxiety due to what she has seen through her job.  She has had multiple rounds of IUI's and IVF procedures.  She now has children through adoption.    Subjective      Review of Systems:   Pertinent ROS in HPI    Past Medical History, Social History, Family History and Care Team were all reviewed with patient and updated as appropriate.     Medications:     Current Outpatient Medications:     multivitamin with  minerals tablet tablet, Take 1 tablet by mouth Daily., Disp: , Rfl:     pantoprazole (PROTONIX) 40 MG EC tablet, Take 1 tablet by mouth Daily., Disp: 90 tablet, Rfl: 2    Probiotic Product (PROBIOTIC PO), Take  by mouth., Disp: , Rfl:     rosuvastatin (CRESTOR) 10 MG tablet, Take 1 tablet by mouth Daily., Disp: 90 tablet, Rfl: 2    DULoxetine (Cymbalta) 20 MG capsule, Take 2 capsules by mouth Daily for 7 days, THEN 1 capsule Daily for 7 days, THEN 1 capsule Every Other Day for 7 days. Then Stop medication., Disp: 24 capsule, Rfl: 0    Allergies:   Allergies   Allergen Reactions    Ultram [Tramadol] Nausea And Vomiting and Headache       Immunizations:  Immunization History   Administered Date(s) Administered    COVID-19 (PFIZER) Purple Cap Monovalent 03/17/2021, 04/07/2021    COVID-19 (UNSPECIFIED) 04/01/2021, 05/01/2021    Td (TDVAX) 06/20/2010    Tdap 01/01/2021, 07/09/2021       Colorectal Screening:   Last Completed Colonoscopy            COLORECTAL CANCER SCREENING (COLONOSCOPY - Every 5 Years) Next due on 9/6/2028 09/06/2023  SCANNED - COLONOSCOPY    09/06/2023  SCANNED - COLONOSCOPY    09/06/2023  SCANNED - COLONOSCOPY    06/05/2019  SCANNED - COLONOSCOPY    12/13/2018  SCANNED - COLONOSCOPY    Only the first 5 history entries have been loaded, but more history exists.                  Mammogram:   Last Completed Mammogram            MAMMOGRAM (Every 2 Years) Next due on 4/30/2026 04/30/2024  Mammo Screening Digital Tomosynthesis Bilateral With CAD    04/10/2023  Mammo Diagnostic Digital Tomosynthesis Bilateral With CAD    03/30/2022  Mammo Diagnostic Digital Tomosynthesis Bilateral With CAD    08/10/2021  Mammo Screening Digital Tomosynthesis Bilateral With CAD    08/08/2020  Mammo Screening Digital Tomosynthesis Bilateral With CAD    Only the first 5 history entries have been loaded, but more history exists.                     Tobacco Use: Medium Risk (8/15/2024)    Patient History     Smoking  "Tobacco Use: Former     Smokeless Tobacco Use: Never     Passive Exposure: Never       Social History     Substance and Sexual Activity   Alcohol Use No        Social History     Substance and Sexual Activity   Drug Use Never      Menopause: post menopausal    Labs:  Results for orders placed or performed in visit on 04/30/24   Rheumatoid Factor, Quant    Specimen: Arm, Right; Blood   Result Value Ref Range    Rheumatoid Factor Quantitative 10.5 0.0 - 14.0 IU/mL   EMMETT    Specimen: Arm, Right; Blood   Result Value Ref Range    EMMETT Direct Negative Negative   ANCA Panel    Specimen: Arm, Right; Blood   Result Value Ref Range    ANTI-MPO ANTIBODIES <0.2 0.0 - 0.9 units    ANTI-PR3 ANTIBODIES <0.2 0.0 - 0.9 units    C-ANCA <1:20 Neg:<1:20 titer    P-ANCA <1:20 Neg:<1:20 titer    Atypical pANCA <1:20 Neg:<1:20 titer   Basic Metabolic Panel    Specimen: Arm, Right; Blood   Result Value Ref Range    Glucose 87 65 - 99 mg/dL    BUN 18 6 - 20 mg/dL    Creatinine 0.72 0.57 - 1.00 mg/dL    Sodium 142 136 - 145 mmol/L    Potassium 3.8 3.5 - 5.2 mmol/L    Chloride 104 98 - 107 mmol/L    CO2 28.3 22.0 - 29.0 mmol/L    Calcium 9.3 8.6 - 10.5 mg/dL    BUN/Creatinine Ratio 25.0 7.0 - 25.0    Anion Gap 9.7 5.0 - 15.0 mmol/L    eGFR 104.6 >60.0 mL/min/1.73       Objective     Physical Exam:  Vital Signs:   Vitals:    08/15/24 1424   BP: 124/74   Pulse: 77   Temp: 98.1 °F (36.7 °C)   SpO2: 98%   Weight: 79.4 kg (175 lb)   Height: 162.6 cm (64.02\")       Physical Exam  Vitals and nursing note reviewed.   Constitutional:       General: She is not in acute distress.     Appearance: Normal appearance.   HENT:      Head: Normocephalic and atraumatic.      Right Ear: Tympanic membrane, ear canal and external ear normal.      Left Ear: Tympanic membrane, ear canal and external ear normal.      Nose: Nose normal.      Mouth/Throat:      Mouth: Mucous membranes are moist.      Pharynx: Oropharynx is clear.   Eyes:      General: No scleral " icterus.     Extraocular Movements: Extraocular movements intact.      Conjunctiva/sclera: Conjunctivae normal.      Pupils: Pupils are equal, round, and reactive to light.   Neck:      Thyroid: No thyroid mass, thyromegaly or thyroid tenderness.      Trachea: Trachea and phonation normal.   Cardiovascular:      Rate and Rhythm: Normal rate and regular rhythm.      Pulses:           Dorsalis pedis pulses are 2+ on the right side and 2+ on the left side.        Posterior tibial pulses are 2+ on the right side and 2+ on the left side.      Heart sounds: Normal heart sounds.   Pulmonary:      Effort: Pulmonary effort is normal.      Breath sounds: Normal breath sounds.   Abdominal:      General: Abdomen is flat. Bowel sounds are normal. There is no distension.      Palpations: Abdomen is soft.      Tenderness: There is no abdominal tenderness.   Musculoskeletal:         General: Normal range of motion.      Cervical back: Normal range of motion and neck supple. No tenderness.   Lymphadenopathy:      Cervical: No cervical adenopathy.   Skin:     General: Skin is warm and dry.      Coloration: Skin is not jaundiced or pale.      Findings: Bruising (Scattered bruises in different stages of healing noted to chest, bilateral upper arms, legs, no petechiae), lesion (Crusting/scab skin lesions on bilateral arms, healed lesions on left lower extremity, no signs of infection, scabs are black in color) and rash present. No erythema.   Neurological:      General: No focal deficit present.      Mental Status: She is alert and oriented to person, place, and time.      Cranial Nerves: No cranial nerve deficit.      Motor: No weakness.      Coordination: Coordination normal.      Gait: Gait normal.   Psychiatric:         Attention and Perception: Attention and perception normal.         Mood and Affect: Mood is anxious.         Behavior: Behavior normal. Behavior is cooperative.         Thought Content: Thought content normal.          Cognition and Memory: Memory normal.         Assessment / Plan      Assessment/Plan:   Problem List Items Addressed This Visit          Cardiac and Vasculature    Familial hypercholesterolemia    Relevant Orders    Lipid Panel       Endocrine and Metabolic    Vitamin D deficiency    Relevant Orders    Vitamin D 25 hydroxy       Mental Health    Anxiety       Musculoskeletal and Injuries    Low back pain    Relevant Orders    Sedimentation rate, automated    CBC & Differential    Ambulatory Referral to Rheumatology    Cyclic Citrul Peptide Antibody, IgG / IgA    HLA-B27 Antigen    Myalgia    Relevant Orders    Comprehensive Metabolic Panel    Iron and TIBC    Ferritin    C-reactive protein    Sedimentation rate, automated    CBC & Differential    Ambulatory Referral to Rheumatology    Cyclic Citrul Peptide Antibody, IgG / IgA    HLA-B27 Antigen    Vitamin D 25 hydroxy    Neck pain, bilateral    Relevant Orders    Sedimentation rate, automated    CBC & Differential    Ambulatory Referral to Rheumatology    Cyclic Citrul Peptide Antibody, IgG / IgA    HLA-B27 Antigen     Other Visit Diagnoses       Annual physical exam    -  Primary    Relevant Orders    Comprehensive Metabolic Panel    Lipid Panel    TSH Rfx On Abnormal To Free T4    Hemoglobin A1c    CBC & Differential    Encounter to establish care        Relevant Orders    Comprehensive Metabolic Panel    Lipid Panel    TSH Rfx On Abnormal To Free T4    Hemoglobin A1c    CBC & Differential    Skin lesions        Relevant Orders    D-Dimer, Quantitative    Factor 5 Leiden    Phosphatidylserine Antibodies    Lupus Anticoagulant Panel    Protein S, Total & Free    Protein C Deficiency Profile    Factor 8 Activity    Comprehensive Metabolic Panel    TSH Rfx On Abnormal To Free T4    Hemoglobin A1c    Iron and TIBC    Ferritin    Protime-INR    C-reactive protein    Sedimentation rate, automated    CBC & Differential    Ambulatory Referral to Rheumatology    Cyclic  Citrul Peptide Antibody, IgG / IgA    HLA-B27 Antigen    Bruising        Relevant Orders    D-Dimer, Quantitative    Factor 5 Leiden    Phosphatidylserine Antibodies    Lupus Anticoagulant Panel    Protein S, Total & Free    Protein C Deficiency Profile    Factor 8 Activity    Comprehensive Metabolic Panel    TSH Rfx On Abnormal To Free T4    Hemoglobin A1c    Iron and TIBC    Ferritin    Protime-INR    C-reactive protein    Sedimentation rate, automated    CBC & Differential    Ambulatory Referral to Rheumatology    Cyclic Citrul Peptide Antibody, IgG / IgA    HLA-B27 Antigen    Myofascial muscle pain        Relevant Orders    Iron and TIBC    Ferritin    C-reactive protein    Sedimentation rate, automated    CBC & Differential    Ambulatory Referral to Rheumatology    Cyclic Citrul Peptide Antibody, IgG / IgA    HLA-B27 Antigen    Vitamin D 25 hydroxy    Vitamin B12    Folate    Polyarthralgia        Relevant Orders    Iron and TIBC    Ferritin    C-reactive protein    Sedimentation rate, automated    CBC & Differential    Ambulatory Referral to Rheumatology    Cyclic Citrul Peptide Antibody, IgG / IgA    HLA-B27 Antigen    Vitamin D 25 hydroxy    Vitamin B12    Folate    Chronic fatigue        Relevant Orders    Comprehensive Metabolic Panel    Lipid Panel    TSH Rfx On Abnormal To Free T4    Hemoglobin A1c    Iron and TIBC    Ferritin    C-reactive protein    Sedimentation rate, automated    CBC & Differential    Ambulatory Referral to Rheumatology    Cyclic Citrul Peptide Antibody, IgG / IgA    HLA-B27 Antigen    Vitamin D 25 hydroxy    Vitamin B12    Folate    Chronic pain after hysterectomy              Plan:  Discussed with patient that I believe bruising could be from the Cymbalta, will taper the medication  I will also order labs recheck for hematological disorders and deficiencies, follow-up with hematologist as needed  Patient has overall body pain that worsened after hysterectomy  Discussed  differentials which do include possible myofascial pain syndrome/fibromyalgia.  She has been told for over 20 years that she may have underlying fibromyalgia.  Cymbalta did help with the pain once dose was titrated up to 60 mg but now with the bruising she is willing to taper off and try something different.  Referral to rheumatologist placed to be evaluated for autoimmune disorder.  If everything is negative would recommend them evaluate her for fibromyalgia or myofascial pain syndrome.  She has tried amitriptyline in the past but it was too sedating.  She gets massage therapy which is beneficial.  She has a muscle relaxer which she takes is as needed which is beneficial but it does cause sedation and she does not take it very often.  She does feel worse when she does not get enough sleep, so advised to try to get 7 to 9 hours of sleep, stay hydrated with greater than 64 fluid ounces of water a day and work on stress management.  The skin lesions need to be biopsied by dermatology, she is going to schedule [facility]  Recommend follow-up with gynecology, Dr. Miller, she is interested in trying low-dose estrogen therapy but discussed that I do not do hormone therapy and defer this to the gynecologist    Healthcare Maintenance:  Counseling provided based on age appropriate USPSTF guidelines, vaccines reviewed and recommended  Encouraged 150 minutes of exercise total per week for heart health   Recommend balanced healthy diet  Dental visits twice per year, yearly eye exams, yearly skin assessments with dermatology   Mammogram annually, self breast exam once a month, colonoscopies every 5 years as recommended  Vitamin D 2000 IU, calcium 1200 mg daily for bone health, weightbearing exercises, 15-20 minutes of sunlight daily, limit caffeine    The 10-year ASCVD risk score (Shanna HOFFMAN, et al., 2019) is: 1.5%    Values used to calculate the score:      Age: 49 years      Sex: Female      Is  Non- : No      Diabetic: No      Tobacco smoker: No      Systolic Blood Pressure: 124 mmHg      Is BP treated: No      HDL Cholesterol: 45 mg/dL      Total Cholesterol: 236 mg/dL  BMI is >= 30 and <35. (Class 1 Obesity). The following options were offered after discussion;: information on healthy weight added to patient's after visit summary       Rona Thomson voices understanding and acceptance of this advice and will call back with any further questions or concerns. AVS with preventive healthcare tips printed for patient.     Follow Up:   Return in about 1 year (around 8/15/2025) for Annual.      LESLEY Bullock  [facility]     Patient or patient representative verbalized consent for the use of Ambient Listening during the visit with  LESLEY Washington for chart documentation. 8/15/2024  17:47 EDT    I spent 60 minutes caring for Rona Thomson on this date of service. This time includes time spent by me in the following activities: preparing for the visit, reviewing tests, performing a medically appropriate examination and/or evaluation, counseling and educating the patient/family/caregiver, ordering medications, tests, or procedures and documenting information in the medical record.

## 2024-08-22 LAB — SPECIMEN STATUS: NORMAL

## 2024-08-25 LAB
25(OH)D3+25(OH)D2 SERPL-MCNC: 50.1 NG/ML (ref 30–100)
ALBUMIN SERPL-MCNC: 4.6 G/DL (ref 3.9–4.9)
ALP SERPL-CCNC: 81 IU/L (ref 44–121)
ALT SERPL-CCNC: 25 IU/L (ref 0–32)
APTT HEX PL PPP: NORMAL S
APTT IMM NP PPP: NORMAL S
APTT PPP 1:1 SALINE: NORMAL S
APTT PPP: NORMAL S
AST SERPL-CCNC: 23 IU/L (ref 0–40)
AT III ACT/NOR PPP CHRO: 127 % (ref 75–135)
B2 GLYCOPROT1 IGA SER-ACNC: NORMAL
B2 GLYCOPROT1 IGG SER-ACNC: NORMAL
B2 GLYCOPROT1 IGM SER-ACNC: NORMAL
BASOPHILS # BLD AUTO: 0.1 X10E3/UL (ref 0–0.2)
BASOPHILS NFR BLD AUTO: 1 %
BILIRUB SERPL-MCNC: 0.7 MG/DL (ref 0–1.2)
BUN SERPL-MCNC: 14 MG/DL (ref 6–24)
BUN/CREAT SERPL: 18 (ref 9–23)
CALCIUM SERPL-MCNC: 9.5 MG/DL (ref 8.7–10.2)
CARDIOLIPIN IGG SER IA-ACNC: NORMAL
CARDIOLIPIN IGM SER IA-ACNC: NORMAL
CCP IGA+IGG SERPL IA-ACNC: 5 UNITS (ref 0–19)
CHLORIDE SERPL-SCNC: 102 MMOL/L (ref 96–106)
CHOLEST SERPL-MCNC: 234 MG/DL (ref 100–199)
CO2 SERPL-SCNC: 24 MMOL/L (ref 20–29)
CONFIRM APTT: NORMAL S
CONFIRM DRVVT: NORMAL S
CREAT SERPL-MCNC: 0.77 MG/DL (ref 0.57–1)
CRP SERPL-MCNC: <1 MG/L (ref 0–10)
D DIMER PPP FEU-MCNC: <0.2 MG/L FEU (ref 0–0.49)
DRVVT SCREEN TO CONFIRM RATIO: NORMAL {RATIO}
EGFRCR SERPLBLD CKD-EPI 2021: 96 ML/MIN/1.73
EOSINOPHIL # BLD AUTO: 0.1 X10E3/UL (ref 0–0.4)
EOSINOPHIL NFR BLD AUTO: 2 %
ERYTHROCYTE [DISTWIDTH] IN BLOOD BY AUTOMATED COUNT: 12.2 % (ref 11.7–15.4)
ERYTHROCYTE [SEDIMENTATION RATE] IN BLOOD BY WESTERGREN METHOD: 13 MM/HR (ref 0–32)
F2 C.20210G>A GENO BLD/T: NORMAL
F5 P.R506Q BLD/T QL: NORMAL
FACT VIII ACT/NOR PPP: 87 % (ref 56–140)
FERRITIN SERPL-MCNC: 52 NG/ML (ref 15–150)
FOLATE SERPL-MCNC: >20 NG/ML
GLOBULIN SER CALC-MCNC: 2.4 G/DL (ref 1.5–4.5)
GLUCOSE SERPL-MCNC: 90 MG/DL (ref 70–99)
HBA1C MFR BLD: 5.6 % (ref 4.8–5.6)
HCT VFR BLD AUTO: 44.1 % (ref 34–46.6)
HCYS SERPL-SCNC: 8.2 UMOL/L (ref 0–14.5)
HDLC SERPL-MCNC: 60 MG/DL
HGB BLD-MCNC: 14.7 G/DL (ref 11.1–15.9)
HLA-B27 QL NAA+PROBE: NORMAL
IMM GRANULOCYTES # BLD AUTO: 0 X10E3/UL (ref 0–0.1)
IMM GRANULOCYTES NFR BLD AUTO: 0 %
IMP & REVIEW OF LAB RESULTS: NORMAL
IMP & REVIEW OF LAB RESULTS: NORMAL
INR PPP: 1 (ref 0.9–1.2)
INR PPP: NORMAL
IRON SATN MFR SERPL: 43 % (ref 15–55)
IRON SERPL-MCNC: 127 UG/DL (ref 27–159)
LABORATORY COMMENT REPORT: NORMAL
LDLC SERPL CALC-MCNC: 154 MG/DL (ref 0–99)
LYMPHOCYTES # BLD AUTO: 2.3 X10E3/UL (ref 0.7–3.1)
LYMPHOCYTES NFR BLD AUTO: 45 %
MCH RBC QN AUTO: 32.4 PG (ref 26.6–33)
MCHC RBC AUTO-ENTMCNC: 33.3 G/DL (ref 31.5–35.7)
MCV RBC AUTO: 97 FL (ref 79–97)
MONOCYTES # BLD AUTO: 0.5 X10E3/UL (ref 0.1–0.9)
MONOCYTES NFR BLD AUTO: 9 %
NEUTROPHILS # BLD AUTO: 2.3 X10E3/UL (ref 1.4–7)
NEUTROPHILS NFR BLD AUTO: 43 %
PLATELET # BLD AUTO: 268 X10E3/UL (ref 150–450)
POTASSIUM SERPL-SCNC: 4.1 MMOL/L (ref 3.5–5.2)
PROT C ACT/NOR PPP: 128 % (ref 73–180)
PROT C AG ACT/NOR PPP IA: 100 % (ref 60–150)
PROT S AG ACT/NOR PPP IA: 94 % (ref 60–150)
PROT S FREE AG ACT/NOR PPP IA: 118 % (ref 61–136)
PROT SERPL-MCNC: 7 G/DL (ref 6–8.5)
PROTHROMBIN TIME: 10.6 SEC (ref 9.1–12)
PROTHROMBIN TIME: NORMAL S
PS IGA SER-ACNC: 2 APS UNITS (ref 0–19)
PS IGG SER-ACNC: 9 UNITS (ref 0–30)
PS IGM SER-ACNC: <10 UNITS (ref 0–30)
RBC # BLD AUTO: 4.54 X10E6/UL (ref 3.77–5.28)
SCREEN DRVVT: NORMAL S
SODIUM SERPL-SCNC: 141 MMOL/L (ref 134–144)
THROMBIN TIME: NORMAL S
TIBC SERPL-MCNC: 295 UG/DL (ref 250–450)
TRIGL SERPL-MCNC: 113 MG/DL (ref 0–149)
TSH SERPL DL<=0.005 MIU/L-ACNC: 1 UIU/ML (ref 0.45–4.5)
UIBC SERPL-MCNC: 168 UG/DL (ref 131–425)
VIT B12 SERPL-MCNC: 828 PG/ML (ref 232–1245)
VLDLC SERPL CALC-MCNC: 20 MG/DL (ref 5–40)
WBC # BLD AUTO: 5.3 X10E3/UL (ref 3.4–10.8)

## 2024-08-30 LAB
25(OH)D3+25(OH)D2 SERPL-MCNC: 50.1 NG/ML (ref 30–100)
ALBUMIN SERPL-MCNC: 4.6 G/DL (ref 3.9–4.9)
ALP SERPL-CCNC: 81 IU/L (ref 44–121)
ALT SERPL-CCNC: 25 IU/L (ref 0–32)
APTT HEX PL PPP: 3 SEC
APTT IMM NP PPP: ABNORMAL SEC
APTT PPP 1:1 SALINE: ABNORMAL SEC
APTT PPP: 30.7 SEC
AST SERPL-CCNC: 23 IU/L (ref 0–40)
AT III ACT/NOR PPP CHRO: 127 % (ref 75–135)
B2 GLYCOPROT1 IGA SER-ACNC: <10 SAU
B2 GLYCOPROT1 IGG SER-ACNC: <10 SGU
B2 GLYCOPROT1 IGM SER-ACNC: <10 SMU
BASOPHILS # BLD AUTO: 0.1 X10E3/UL (ref 0–0.2)
BASOPHILS NFR BLD AUTO: 1 %
BILIRUB SERPL-MCNC: 0.7 MG/DL (ref 0–1.2)
BUN SERPL-MCNC: 14 MG/DL (ref 6–24)
BUN/CREAT SERPL: 18 (ref 9–23)
CALCIUM SERPL-MCNC: 9.5 MG/DL (ref 8.7–10.2)
CARDIOLIPIN IGG SER IA-ACNC: <10 GPL
CARDIOLIPIN IGM SER IA-ACNC: <10 MPL
CCP IGA+IGG SERPL IA-ACNC: 5 UNITS (ref 0–19)
CHLORIDE SERPL-SCNC: 102 MMOL/L (ref 96–106)
CHOLEST SERPL-MCNC: 234 MG/DL (ref 100–199)
CO2 SERPL-SCNC: 24 MMOL/L (ref 20–29)
CONFIRM APTT: 0.7 SEC
CONFIRM DRVVT: ABNORMAL SEC
CREAT SERPL-MCNC: 0.77 MG/DL (ref 0.57–1)
CRP SERPL-MCNC: <1 MG/L (ref 0–10)
D DIMER PPP FEU-MCNC: <0.2 MG/L FEU (ref 0–0.49)
DRVVT SCREEN TO CONFIRM RATIO: ABNORMAL RATIO
EGFRCR SERPLBLD CKD-EPI 2021: 96 ML/MIN/1.73
EOSINOPHIL # BLD AUTO: 0.1 X10E3/UL (ref 0–0.4)
EOSINOPHIL NFR BLD AUTO: 2 %
ERYTHROCYTE [DISTWIDTH] IN BLOOD BY AUTOMATED COUNT: 12.2 % (ref 11.7–15.4)
ERYTHROCYTE [SEDIMENTATION RATE] IN BLOOD BY WESTERGREN METHOD: 13 MM/HR (ref 0–32)
F2 C.20210G>A GENO BLD/T: NORMAL
F5 P.R506Q BLD/T QL: NORMAL
FACT VIII ACT/NOR PPP: 87 % (ref 56–140)
FERRITIN SERPL-MCNC: 52 NG/ML (ref 15–150)
FOLATE SERPL-MCNC: >20 NG/ML
GLOBULIN SER CALC-MCNC: 2.4 G/DL (ref 1.5–4.5)
GLUCOSE SERPL-MCNC: 90 MG/DL (ref 70–99)
HBA1C MFR BLD: 5.6 % (ref 4.8–5.6)
HCT VFR BLD AUTO: 44.1 % (ref 34–46.6)
HCYS SERPL-SCNC: 8.2 UMOL/L (ref 0–14.5)
HDLC SERPL-MCNC: 60 MG/DL
HGB BLD-MCNC: 14.7 G/DL (ref 11.1–15.9)
HLA-B27 QL NAA+PROBE: NEGATIVE
IMM GRANULOCYTES # BLD AUTO: 0 X10E3/UL (ref 0–0.1)
IMM GRANULOCYTES NFR BLD AUTO: 0 %
IMP & REVIEW OF LAB RESULTS: NORMAL
IMP & REVIEW OF LAB RESULTS: NORMAL
INR PPP: 1 (ref 0.9–1.2)
INR PPP: 1 RATIO
IRON SATN MFR SERPL: 43 % (ref 15–55)
IRON SERPL-MCNC: 127 UG/DL (ref 27–159)
LABORATORY COMMENT REPORT: ABNORMAL
LDLC SERPL CALC-MCNC: 154 MG/DL (ref 0–99)
LYMPHOCYTES # BLD AUTO: 2.3 X10E3/UL (ref 0.7–3.1)
LYMPHOCYTES NFR BLD AUTO: 45 %
MCH RBC QN AUTO: 32.4 PG (ref 26.6–33)
MCHC RBC AUTO-ENTMCNC: 33.3 G/DL (ref 31.5–35.7)
MCV RBC AUTO: 97 FL (ref 79–97)
MONOCYTES # BLD AUTO: 0.5 X10E3/UL (ref 0.1–0.9)
MONOCYTES NFR BLD AUTO: 9 %
NEUTROPHILS # BLD AUTO: 2.3 X10E3/UL (ref 1.4–7)
NEUTROPHILS NFR BLD AUTO: 43 %
PLATELET # BLD AUTO: 268 X10E3/UL (ref 150–450)
POTASSIUM SERPL-SCNC: 4.1 MMOL/L (ref 3.5–5.2)
PROT C ACT/NOR PPP: 128 % (ref 73–180)
PROT C AG ACT/NOR PPP IA: 100 % (ref 60–150)
PROT S AG ACT/NOR PPP IA: 94 % (ref 60–150)
PROT S FREE AG ACT/NOR PPP IA: 118 % (ref 61–136)
PROT SERPL-MCNC: 7 G/DL (ref 6–8.5)
PROTHROMBIN TIME: 10.5 SEC
PROTHROMBIN TIME: 10.6 SEC (ref 9.1–12)
PS IGA SER-ACNC: 2 APS UNITS (ref 0–19)
PS IGG SER-ACNC: 9 UNITS (ref 0–30)
PS IGM SER-ACNC: <10 UNITS (ref 0–30)
RBC # BLD AUTO: 4.54 X10E6/UL (ref 3.77–5.28)
SCREEN DRVVT: 34 SEC
SODIUM SERPL-SCNC: 141 MMOL/L (ref 134–144)
THROMBIN TIME: 18.4 SEC
TIBC SERPL-MCNC: 295 UG/DL (ref 250–450)
TRIGL SERPL-MCNC: 113 MG/DL (ref 0–149)
TSH SERPL DL<=0.005 MIU/L-ACNC: 1 UIU/ML (ref 0.45–4.5)
UIBC SERPL-MCNC: 168 UG/DL (ref 131–425)
VIT B12 SERPL-MCNC: 828 PG/ML (ref 232–1245)
VLDLC SERPL CALC-MCNC: 20 MG/DL (ref 5–40)
WBC # BLD AUTO: 5.3 X10E3/UL (ref 3.4–10.8)

## 2024-09-16 RX ORDER — FLUOXETINE 10 MG/1
10 CAPSULE ORAL DAILY
Qty: 30 CAPSULE | Refills: 2 | Status: SHIPPED | OUTPATIENT
Start: 2024-09-16

## 2024-09-24 ENCOUNTER — OFFICE VISIT (OUTPATIENT)
Dept: INTERNAL MEDICINE | Facility: CLINIC | Age: 49
End: 2024-09-24
Payer: OTHER GOVERNMENT

## 2024-09-24 VITALS
OXYGEN SATURATION: 96 % | HEART RATE: 83 BPM | SYSTOLIC BLOOD PRESSURE: 128 MMHG | DIASTOLIC BLOOD PRESSURE: 68 MMHG | WEIGHT: 174 LBS | BODY MASS INDEX: 29.71 KG/M2 | HEIGHT: 64 IN | TEMPERATURE: 97.9 F

## 2024-09-24 DIAGNOSIS — R63.2 BINGE EATING: ICD-10-CM

## 2024-09-24 DIAGNOSIS — M25.50 POLYARTHRALGIA: ICD-10-CM

## 2024-09-24 DIAGNOSIS — L98.9 SKIN LESIONS: Primary | ICD-10-CM

## 2024-09-24 DIAGNOSIS — M54.50 CHRONIC BILATERAL LOW BACK PAIN WITHOUT SCIATICA: ICD-10-CM

## 2024-09-24 DIAGNOSIS — M79.18 MYOFASCIAL MUSCLE PAIN: ICD-10-CM

## 2024-09-24 DIAGNOSIS — R63.8 ABNORMAL CRAVING: ICD-10-CM

## 2024-09-24 DIAGNOSIS — F41.9 ANXIETY AND DEPRESSION: ICD-10-CM

## 2024-09-24 DIAGNOSIS — T14.8XXA BRUISING: ICD-10-CM

## 2024-09-24 DIAGNOSIS — M54.2 NECK PAIN, BILATERAL: ICD-10-CM

## 2024-09-24 DIAGNOSIS — G89.29 CHRONIC BILATERAL LOW BACK PAIN WITHOUT SCIATICA: ICD-10-CM

## 2024-09-24 DIAGNOSIS — F32.A ANXIETY AND DEPRESSION: ICD-10-CM

## 2024-09-24 PROCEDURE — 99214 OFFICE O/P EST MOD 30 MIN: CPT | Performed by: NURSE PRACTITIONER

## 2024-09-24 RX ORDER — TOPIRAMATE 25 MG/1
TABLET, FILM COATED ORAL
Qty: 90 TABLET | Refills: 1 | Status: SHIPPED | OUTPATIENT
Start: 2024-09-24

## 2024-10-22 DIAGNOSIS — E78.01 FAMILIAL HYPERCHOLESTEROLEMIA: ICD-10-CM

## 2024-10-23 RX ORDER — ROSUVASTATIN CALCIUM 10 MG/1
10 TABLET, COATED ORAL DAILY
Qty: 90 TABLET | Refills: 0 | Status: SHIPPED | OUTPATIENT
Start: 2024-10-23

## 2024-12-12 ENCOUNTER — TELEMEDICINE (OUTPATIENT)
Dept: INTERNAL MEDICINE | Facility: CLINIC | Age: 49
End: 2024-12-12
Payer: OTHER GOVERNMENT

## 2024-12-12 VITALS — HEIGHT: 64 IN | BODY MASS INDEX: 29.19 KG/M2 | WEIGHT: 171 LBS

## 2024-12-12 DIAGNOSIS — G89.4 CHRONIC PAIN SYNDROME: ICD-10-CM

## 2024-12-12 DIAGNOSIS — F41.9 ANXIETY AND DEPRESSION: Primary | ICD-10-CM

## 2024-12-12 DIAGNOSIS — R21 ACUTE BLISTERING ERUPTION OF SKIN: ICD-10-CM

## 2024-12-12 DIAGNOSIS — R53.82 CHRONIC FATIGUE: ICD-10-CM

## 2024-12-12 DIAGNOSIS — F32.A ANXIETY AND DEPRESSION: Primary | ICD-10-CM

## 2024-12-12 PROCEDURE — 99214 OFFICE O/P EST MOD 30 MIN: CPT | Performed by: NURSE PRACTITIONER

## 2024-12-12 NOTE — PROGRESS NOTES
Patient has chosen to receive care through a telehealth visit.  Does the patient consent to use a video/audio device for their medical care today: Yes  The visit included audio and video interaction: Yes  Location of patient: home  Location of Provider: work/office  Active Parties:  Aura Keita (APRN), Alisia Andres (RIKY), and patient.     Subjective    Rona Thomson is a 49 y.o. female here for:  Chief Complaint   Patient presents with    Follow-up    Anxiety       Anxiety   Patient reports no chest pain or nausea.        49-year-old female presents to follow-up anxiety.  She is wanting to increase dose of Prozac from 10 to 20 mg to further help with her anxiety.  Overall that is improving with time, but she is dealing with a lot.  Currently buying a new house, moving before [place].    She has an appointment to establish with rheumatology for evaluation of autoimmune/inflammatory arthritic conditions.  She has had random rashes and overall joint pain.  She went to the dermatologist and got biopsies of the skin lesion/rash and was told it is a blistering condition and it is related to an autoimmune condition and they recommended her follow-up with rheumatologist.  The skin lesions resolved during her last appointment, but patient states she had a flare of really bad pain in this flare was accompanied by the rash reappearing on her arm and back.  She is going to contact the dermatologist.  Her appointment with rheumatology is 12/23/2024.      During today's visit, I reviewed the documented allergies, medications, chief complaint, and pertinent vitals.  I have confirmed with the patient that there have been no changes since this information was discussed with my clinical team member.    The following portions of the patient's history were reviewed and updated as appropriate: allergies, current medications, past family history, past medical history, past social history, past surgical history and problem  "list.    Review of Systems   Constitutional:  Positive for fatigue. Negative for chills, diaphoresis and fever.   HENT:  Negative for congestion, sore throat and swollen glands.    Respiratory:  Negative for cough.    Cardiovascular:  Negative for chest pain.   Gastrointestinal:  Negative for abdominal pain, nausea and vomiting.   Genitourinary:  Negative for dysuria.   Musculoskeletal:  Positive for myalgias and neck pain.   Skin:  Positive for rash.   Neurological:  Positive for weakness and numbness.       Visit Vitals  Ht 162.6 cm (64.02\")   Wt 77.6 kg (171 lb)   LMP 06/06/2022   BMI 29.33 kg/m²         Objective   Nursing note reviewed.  General: healthy appearing, no distress.  HENT: no facial deformities, hearing is within normal limits   Eyes: EOM intact, no obvious nystagmus.  Neck: phonation normal. No stridor  Pulm: Normal work of breathing  Neuro: A&O x 3. No abnormal movements.  Skin: No rashes appreciated to face. No visible cyanosis  Psych: Mood and affect appropriate. Insight normal. Judgement appropriate. Behavior normal. Memory normal.       Problem List Items Addressed This Visit    None  Visit Diagnoses       Anxiety and depression    -  Primary    Relevant Medications    FLUoxetine (PROzac) 20 MG capsule    Acute blistering eruption of skin        Chronic fatigue        Chronic pain syndrome              Increase Prozac to 20 mg daily  Follow-up with dermatology due to rash reappearing  Follow-up with rheumatology, dermatologist has concerns of autoimmune condition due to biopsy results.  We do not have a copy of these results and she is going to fax them to us and bring a copy of the results to the rheumatology appointment.  She is using primrose oil and says this helps with her overall joint and muscle pains.  Will follow-up after new year, sooner if needed    Aura Keita, LESLEY    "

## 2024-12-18 ENCOUNTER — TELEPHONE (OUTPATIENT)
Age: 49
End: 2024-12-18
Payer: OTHER GOVERNMENT

## 2024-12-18 NOTE — TELEPHONE ENCOUNTER
[facility] staff attempted to follow warm transfer process and was unsuccessful     Caller: Rona Thomson    Relationship to patient: Self    Best call back number: [phone]    Patient is needing: NEW PT IS REQUESTING TO SWITCH TO DR. JONES INSTEAD OF DR. PERRY.   SHE HAS HER NEW PT SCHEDULED WITH DR. JONES FOR 12/23/24.  PLEASE CALL HER ASAP TO DISCUSS.    [facility] ATTEMPTED A WT

## 2024-12-20 NOTE — TELEPHONE ENCOUNTER
Spoke with patient and we switched doctors per her request, she is currently scheduled for next available with Dr. Avery and was added to the wait list for next soonest opening.

## 2025-01-27 DIAGNOSIS — E78.01 FAMILIAL HYPERCHOLESTEROLEMIA: ICD-10-CM

## 2025-01-27 DIAGNOSIS — K21.9 GASTROESOPHAGEAL REFLUX DISEASE WITHOUT ESOPHAGITIS: ICD-10-CM

## 2025-01-27 RX ORDER — FLUOXETINE 40 MG/1
40 CAPSULE ORAL DAILY
Qty: 90 CAPSULE | Refills: 3 | Status: SHIPPED | OUTPATIENT
Start: 2025-01-27

## 2025-01-27 RX ORDER — ROSUVASTATIN CALCIUM 10 MG/1
10 TABLET, COATED ORAL DAILY
Qty: 90 TABLET | Refills: 0 | OUTPATIENT
Start: 2025-01-27

## 2025-01-27 RX ORDER — PANTOPRAZOLE SODIUM 40 MG/1
40 TABLET, DELAYED RELEASE ORAL DAILY
Qty: 90 TABLET | Refills: 2 | OUTPATIENT
Start: 2025-01-27

## 2025-01-30 ENCOUNTER — PATIENT MESSAGE (OUTPATIENT)
Dept: INTERNAL MEDICINE | Facility: CLINIC | Age: 50
End: 2025-01-30
Payer: OTHER GOVERNMENT

## 2025-01-30 DIAGNOSIS — E78.01 FAMILIAL HYPERCHOLESTEROLEMIA: ICD-10-CM

## 2025-01-30 DIAGNOSIS — K21.9 GASTROESOPHAGEAL REFLUX DISEASE WITHOUT ESOPHAGITIS: ICD-10-CM

## 2025-01-31 RX ORDER — ROSUVASTATIN CALCIUM 10 MG/1
10 TABLET, COATED ORAL DAILY
Qty: 90 TABLET | Refills: 1 | Status: SHIPPED | OUTPATIENT
Start: 2025-01-31

## 2025-01-31 RX ORDER — PANTOPRAZOLE SODIUM 40 MG/1
40 TABLET, DELAYED RELEASE ORAL DAILY
Qty: 90 TABLET | Refills: 1 | Status: SHIPPED | OUTPATIENT
Start: 2025-01-31

## 2025-04-17 ENCOUNTER — LAB (OUTPATIENT)
Facility: HOSPITAL | Age: 50
End: 2025-04-17
Payer: OTHER GOVERNMENT

## 2025-04-17 ENCOUNTER — TELEPHONE (OUTPATIENT)
Age: 50
End: 2025-04-17

## 2025-04-17 ENCOUNTER — OFFICE VISIT (OUTPATIENT)
Age: 50
End: 2025-04-17
Payer: OTHER GOVERNMENT

## 2025-04-17 VITALS
HEART RATE: 88 BPM | TEMPERATURE: 98 F | DIASTOLIC BLOOD PRESSURE: 70 MMHG | WEIGHT: 170.3 LBS | SYSTOLIC BLOOD PRESSURE: 114 MMHG | HEIGHT: 64 IN | BODY MASS INDEX: 29.08 KG/M2

## 2025-04-17 DIAGNOSIS — M25.50 ARTHRALGIA OF MULTIPLE SITES: ICD-10-CM

## 2025-04-17 DIAGNOSIS — M79.7 FIBROMYALGIA: Primary | ICD-10-CM

## 2025-04-17 DIAGNOSIS — M79.10 MYALGIA: ICD-10-CM

## 2025-04-17 DIAGNOSIS — G89.29 CHRONIC BILATERAL LOW BACK PAIN WITHOUT SCIATICA: ICD-10-CM

## 2025-04-17 DIAGNOSIS — L30.9 DERMATITIS: ICD-10-CM

## 2025-04-17 DIAGNOSIS — M51.34 DDD (DEGENERATIVE DISC DISEASE), THORACIC: ICD-10-CM

## 2025-04-17 DIAGNOSIS — Z86.718 HISTORY OF BLOOD CLOTS: ICD-10-CM

## 2025-04-17 DIAGNOSIS — R53.83 OTHER FATIGUE: ICD-10-CM

## 2025-04-17 DIAGNOSIS — L29.9 ITCHING: ICD-10-CM

## 2025-04-17 DIAGNOSIS — G47.9 SLEEP DISTURBANCE: ICD-10-CM

## 2025-04-17 DIAGNOSIS — M54.50 CHRONIC BILATERAL LOW BACK PAIN WITHOUT SCIATICA: ICD-10-CM

## 2025-04-17 DIAGNOSIS — N80.9 ENDOMETRIOSIS: ICD-10-CM

## 2025-04-17 DIAGNOSIS — F43.9 STRESS: ICD-10-CM

## 2025-04-17 LAB
BASOPHILS # BLD AUTO: 0.06 10*3/MM3 (ref 0–0.2)
BASOPHILS NFR BLD AUTO: 0.8 % (ref 0–1.5)
BILIRUB UR QL STRIP: NEGATIVE
CLARITY UR: CLEAR
COLOR UR: YELLOW
DEPRECATED RDW RBC AUTO: 42.9 FL (ref 37–54)
EOSINOPHIL # BLD AUTO: 0.14 10*3/MM3 (ref 0–0.4)
EOSINOPHIL NFR BLD AUTO: 1.9 % (ref 0.3–6.2)
ERYTHROCYTE [DISTWIDTH] IN BLOOD BY AUTOMATED COUNT: 12 % (ref 12.3–15.4)
ERYTHROCYTE [SEDIMENTATION RATE] IN BLOOD: 14 MM/HR (ref 0–20)
GLUCOSE UR STRIP-MCNC: NEGATIVE MG/DL
HCT VFR BLD AUTO: 42.2 % (ref 34–46.6)
HGB BLD-MCNC: 14.4 G/DL (ref 12–15.9)
HGB UR QL STRIP.AUTO: NEGATIVE
HOLD SPECIMEN: NORMAL
IMM GRANULOCYTES # BLD AUTO: 0.02 10*3/MM3 (ref 0–0.05)
IMM GRANULOCYTES NFR BLD AUTO: 0.3 % (ref 0–0.5)
KETONES UR QL STRIP: NEGATIVE
LEUKOCYTE ESTERASE UR QL STRIP.AUTO: NEGATIVE
LYMPHOCYTES # BLD AUTO: 2.88 10*3/MM3 (ref 0.7–3.1)
LYMPHOCYTES NFR BLD AUTO: 38.3 % (ref 19.6–45.3)
MCH RBC QN AUTO: 32.9 PG (ref 26.6–33)
MCHC RBC AUTO-ENTMCNC: 34.1 G/DL (ref 31.5–35.7)
MCV RBC AUTO: 96.3 FL (ref 79–97)
MONOCYTES # BLD AUTO: 0.82 10*3/MM3 (ref 0.1–0.9)
MONOCYTES NFR BLD AUTO: 10.9 % (ref 5–12)
NEUTROPHILS NFR BLD AUTO: 3.59 10*3/MM3 (ref 1.7–7)
NEUTROPHILS NFR BLD AUTO: 47.8 % (ref 42.7–76)
NITRITE UR QL STRIP: NEGATIVE
NRBC BLD AUTO-RTO: 0 /100 WBC (ref 0–0.2)
PH UR STRIP.AUTO: 7 [PH] (ref 5–8)
PLATELET # BLD AUTO: 299 10*3/MM3 (ref 140–450)
PMV BLD AUTO: 10.7 FL (ref 6–12)
PROT UR QL STRIP: NEGATIVE
RBC # BLD AUTO: 4.38 10*6/MM3 (ref 3.77–5.28)
SP GR UR STRIP: 1.01 (ref 1–1.03)
UROBILINOGEN UR QL STRIP: NORMAL
WBC NRBC COR # BLD AUTO: 7.51 10*3/MM3 (ref 3.4–10.8)

## 2025-04-17 PROCEDURE — 82550 ASSAY OF CK (CPK): CPT

## 2025-04-17 PROCEDURE — 85025 COMPLETE CBC W/AUTO DIFF WBC: CPT

## 2025-04-17 PROCEDURE — 85652 RBC SED RATE AUTOMATED: CPT

## 2025-04-17 PROCEDURE — 86140 C-REACTIVE PROTEIN: CPT

## 2025-04-17 PROCEDURE — 81003 URINALYSIS AUTO W/O SCOPE: CPT

## 2025-04-17 PROCEDURE — 80053 COMPREHEN METABOLIC PANEL: CPT

## 2025-04-17 PROCEDURE — 36415 COLL VENOUS BLD VENIPUNCTURE: CPT

## 2025-04-17 NOTE — PROGRESS NOTES
"                             Office Visit       Date: 04/17/2025   Patient Name: Rona Thomson  MRN: [id number]  YOB: 1975    Referring Physician: Aura Keita APRN     Chief Complaint:   Chief Complaint   Patient presents with   • Joint Pain   • Neck Pain   • Back Pain   • Skin Lesion       History of Present Illness: Rona Thomson is a 49 y.o. female who is here today consultation from her PCP for myalgias, arthralgias, neck and back pain, myofascial pain, chronic fatigue, skin lesions, bruising    History of Present Illness  The patient presents for evaluation of chronic pain, dermatitis/itching, and suspected fibromyalgia.    She is here to establish care for ongoing possible needs. She has a history of physicians suspecting fibromyalgia.  Her primary care physician referred her to rheumatology for further evaluation.     The patient reports experiencing severe chronic itching on her back and sometimes arms and legs, which she describes as having small bumps or scabs that persist despite treatment.  Her dermatologist at [facility] has not been able to give her a specific diagnosis but skin biopsy arm 12/24 showed an unspecified \"autoimmune condition\".  She has tried steroid creams and oral steroids.  She also reports taking a medication that required monthly blood tests through her dermatologist, but decided not to take it after finding it ineffective.  She does not recall the name of this.    She has a history of a single blood clot which occurred post-surgery in 2016, necessitating temporary Eliquis treatment.    Extensive workup including hypercoagulable workup with hematology Dr. Velazquez has been negative.      She has a history of scoliosis from an early age and degenerative disc disease.   She has a strong family history of cancer and has been taking evening primrose for several months, which has been beneficial.     She reports widespread musculoskeletal pain " that comes and goes.  Muscles and joints are often sore to press on.  She is frequently fatigued.  Her joints and muscles are often sore.  She is under a lot of stress as she has had to go back to work and has 2 small children.  Her sleep is poor.  No swollen inflamed joints.  No psoriasis.  No malar rash.  No oral nasal ulcers.  No pleurisy or pericarditis.  No renal hematologic abnormalities.  No recurrent blood clots.  No seizure disorder.  No iritis.  No recurrent miscarriages.  No psoriasis.  No Raynaud's.  Denies fevers weight loss or night sweats    She previously found relief of musculoskeletal pain with duloxetine, but it caused bruising, so she had to taper off it.    She is under a lot of stress as she has 2 young children and has had to return to work to help support her family.      FAMILY HISTORY  The patient has a strong cancer history in the family.      MEDICATIONS  Current: Evening primrose.  Discontinued: Cymbalta(bruising), allergic reaction to tramadol.    Records reviewed:  -Labs 4/30/2024: Negative EMMETT, negative rheumatoid factor, negative ANCA panel  -Lab 8/21/2024: Negative HLA-B27 negative CCP antibody, normal sed rate, normal CBC, normal CMP, normal INR, negative lupus anticoagulant, negative cardiolipin antibody, negative beta-2 glycoprotein antibodies, normal Antithrombin III function, normal factor VIII activity negative factor V Leiden    -Chest x-ray 10/26/2024: Normal findings  -Hand x-ray 7/7/2023: Normal findings  -Thoracic spine x-ray 12/20/2019: Mild arthritic change          Subjective     Review of Systems: Review of Systems   Constitutional:  Positive for chills, diaphoresis, fatigue and unexpected weight loss. Negative for fever.   HENT:  Positive for congestion, dental problem, mouth sores, nosebleeds, postnasal drip, rhinorrhea, sinus pressure, sneezing, sore throat, swollen glands, tinnitus and trouble swallowing.    Eyes:  Positive for blurred vision, photophobia and  itching. Negative for pain and redness.   Respiratory:  Positive for cough, chest tightness and shortness of breath.    Cardiovascular:  Positive for leg swelling. Negative for chest pain.   Gastrointestinal:  Positive for abdominal pain, anal bleeding, blood in stool, constipation, diarrhea, rectal pain and GERD. Negative for nausea and vomiting.   Endocrine: Positive for heat intolerance. Negative for polydipsia and polyuria.   Genitourinary:  Positive for amenorrhea, breast lump, breast pain, decreased libido, dyspareunia, flank pain, frequency, pelvic pain, pelvic pressure and vaginal bleeding. Negative for dysuria, genital sores and hematuria.   Musculoskeletal:  Positive for arthralgias, back pain, joint swelling, myalgias, neck pain and neck stiffness.   Skin:  Positive for dry skin, rash, wound and bruise.   Allergic/Immunologic: Positive for environmental allergies.   Neurological:  Positive for dizziness, weakness, light-headedness, numbness, headache and memory problem. Negative for seizures.   Hematological:  Positive for adenopathy. Bruises/bleeds easily.   Psychiatric/Behavioral:  Positive for agitation, decreased concentration, dysphoric mood, sleep disturbance, depressed mood and stress. The patient is nervous/anxious.         Past Medical History:   Past Medical History:   Diagnosis Date   • Anxiety    • Arthritis of back    • Cholelithiasis 2014    Gallbkadder removed   • Colon polyp 2014   • Corneal erosion of right eye    • Degenerative disc disease, cervical    • Depression 9/1/2024    Stopping the Cymbalta has really been hard on me   • DVT (deep venous thrombosis) 2016    after diagnostic lap   • Elevated cholesterol    • Endometriosis    • Fracture of wrist 2003    Small bone in right hand   • Fracture, foot 1991    Right foot   • GERD (gastroesophageal reflux disease)    • Heart palpitations     HX OF PER PT   • Low back pain 1988   • Low back strain    • Neck pain    • Neck strain    • PONV  (postoperative nausea and vomiting)    • Positive TB test    • Scoliosis     Dx as a child   • Tear of meniscus of knee Torn zairaus    Kicked m by horse- Right leg - just below knee   • Wears contact lenses    • Wears eyeglasses        Past Surgical History:   Past Surgical History:   Procedure Laterality Date   • BASAL CELL CARCINOMA EXCISION  2019   • BREAST BIOPSY Right 2022   • CHOLECYSTECTOMY     • COLONOSCOPY      polyps removed   • DIAGNOSTIC LAPAROSCOPY  12/2014    x 2 other in    • DIAGNOSTIC LAPAROSCOPY N/A 07/15/2016    Procedure: ROBOTIC EXCISION OF ENDOMETRIOSIS, diagnostic laproscopy, bilateral uretero lysis;  Surgeon: Angeles Miller MD;  Location:  [facility];  Service:    • ENDOSCOPY      polyps removed from stomach    • HYSTERECTOMY     • TOTAL LAPAROSCOPIC HYSTERECTOMY SALPINGO OOPHORECTOMY N/A 2023    Procedure: TOTAL LAPAROSCOPIC HYSTERECTOMY BILATERAL SALPINGO OOPHORECTOMY WITH DAVINCI ROBOT;  Surgeon: Angeles Miller MD;  Location:  [facility];  Service: Robotics - DaVinci;  Laterality: N/A;   • TRIGGER POINT INJECTION         Family History:   Family History   Problem Relation Age of Onset   • Cancer Father         Colon CA / DX    • Colon cancer Father    • Hypertension Father    • Colon cancer Maternal Grandmother    • Breast cancer Maternal Aunt 30   • Breast cancer Cousin 20   • Breast cancer Mother 63   • Cancer Mother         Breast CA / DX 2019   • COPD Paternal Grandfather         Passed in    • Drug abuse Sister         Currently doing well   • Ovarian cancer Neg Hx        Social History:   Social History     Socioeconomic History   • Marital status:    Tobacco Use   • Smoking status: Former     Current packs/day: 0.00     Average packs/day: 1 pack/day for 25.6 years (25.6 ttl pk-yrs)     Types: Cigarettes     Start date: 1993     Quit date:      Years since quittin.2     Passive exposure: Never   •  "Smokeless tobacco: Never   • Tobacco comments:     so glad i was able to quit   Vaping Use   • Vaping status: Never Used   Substance and Sexual Activity   • Alcohol use: No   • Drug use: Never   • Sexual activity: Yes     Partners: Male     Birth control/protection: Hysterectomy       Medications:   Current Outpatient Medications:   •  EVENING PRIMROSE OIL PO, Take  by mouth., Disp: , Rfl:   •  FLUoxetine (PROzac) 40 MG capsule, Take 1 capsule by mouth Daily., Disp: 90 capsule, Rfl: 3  •  pantoprazole (PROTONIX) 40 MG EC tablet, Take 1 tablet by mouth Daily., Disp: 90 tablet, Rfl: 1  •  rosuvastatin (CRESTOR) 10 MG tablet, Take 1 tablet by mouth Daily., Disp: 90 tablet, Rfl: 1    Allergies:   Allergies   Allergen Reactions   • Ultram [Tramadol] Nausea And Vomiting and Headache         Objective      Vital Signs:   Vitals:    04/17/25 1509   BP: 114/70   BP Location: Right arm   Patient Position: Sitting   Cuff Size: Adult   Pulse: 88   Temp: 98 °F (36.7 °C)   Weight: 77.2 kg (170 lb 4.8 oz)   Height: 162.6 cm (64\")   PainSc: 1      Body mass index is 29.23 kg/m².       Physical Exam:  Physical Exam   MUSCULOSKELETAL:   No peripheral synovitis.  No rheumatoid nodules or tophi.  No dactylitis.  No pitting of the nails.  No joint deformity  Widespread tender points present above below the waist  Tender cervical thoracic lumbar spine    Complete joint exam was performed including the MCPs, PIPs, DIPs of the hands, wrists, elbows, shoulders, hips, knees and ankles.  No soft tissue swelling or tenderness is present except as above.    General: The patient is well-developed and well nourished. Cooperative, alert and oriented. Affect is normal. Hydration appears normal.   HEENT: Normocephalic and atraumatic. Lids and conjunctiva are normal. Pupils are equal and sclera are clear. Oropharynx is clear   NECK neck is supple without adenopathy, masses or thyromegaly.   CARDIOVASCULAR: Regular rate and rhythm. No murmurs, rubs or " "gallops   LUNGS: Effort is normal. Lungs are clear bilateral   ABDOMEN: Not examined  EXTREMITIES: Peripheral pulses are intact. No clubbing.   SKIN: No rashes. No subcutaneous nodules. No digital ulcers. No sclerodactyly.  No malar rash.  No discoid rash.  No heliotrope rash.  No Gottron's papules.  No shawl rash  NEUROLOGIC: Gait is normal. Strength testing is normal.  No focal neurologic deficits    Results Review:   Labs:    Lab Results   Component Value Date    GLUCOSE 90 08/21/2024    BUN 14 08/21/2024    CREATININE 0.77 08/21/2024    EGFR 96 08/21/2024    BCR 18 08/21/2024    K 4.1 08/21/2024    CO2 24 08/21/2024    CALCIUM 9.5 08/21/2024    ALBUMIN 4.6 08/21/2024    BILITOT 0.7 08/21/2024    AST 23 08/21/2024    ALT 25 08/21/2024     Lab Results   Component Value Date    WBC 5.3 08/21/2024    HGB 14.7 08/21/2024    HCT 44.1 08/21/2024    MCV 97 08/21/2024     08/21/2024     Lab Results   Component Value Date    SEDRATE 13 08/21/2024     Lab Results   Component Value Date    CRP <0.30 04/12/2024     No results found for: \"QUANTIFERO\", \"QUANTITB1\", \"QUANTITB2\", \"QUANTIFERN\", \"QUANTIFERM\", \"QUANTITBGLDP\"  No results found for: \"RF\"  Lab Results   Component Value Date    HEPBSAG Negative 06/06/2019    HEPCVIRUSABY Non-Reactive 07/02/2020           Procedures    Assessment / Plan      1. Fibromyalgia    2. Dermatitis    3. Itching    4. Myalgia    5. Arthralgia of multiple sites    6. DDD (degenerative disc disease), thoracic    7. Chronic bilateral low back pain without sciatica    8. Endometriosis    9. History of blood clots    10. Stress    11. Sleep disturbance    12. Other fatigue      Lives in [place].  Works for [employer].  2 adopted children.  Specialist: Hematology Dr. Velazquez, orthopedics Dr. Donovan, [facility]  Assessment & Plan  -Fibromyalgia   -Chronic pain disorder  -Labs 4/30/2024: Negative EMMETT, negative rheumatoid factor, negative ANCA panel  -Lab 8/21/2024: Negative " HLA-B27 negative CCP antibody, normal sed rate, normal CBC, normal CMP, normal INR, negative lupus anticoagulant, negative cardiolipin antibody, negative beta-2 glycoprotein antibodies, normal Antithrombin III function, normal factor VIII activity negative factor V Leiden    49-year-old female seen today in consultation from her PCP for myalgias, arthralgias, neck and back pain, myofascial pain, chronic fatigue, skin lesions, bruising.  There has been suspicion for fibromyalgia with her other providers    Extensive labs over the past year with her hematologist, including markers for rheumatoid arthritis, lupus, and connective tissue disease, ANCA vasculitis, hypercoagulability workup have all returned negative results.   There is no clinical evidence of an underlying inflammatory rheumatic disease or connective tissue disease or inflammatory arthritis  No weight loss fevers or night sweats to suggest an occult malignancy.  Her symptoms of widespread myofascial pain suggest a probable diagnosis of fibromyalgia, a chronic pain disorder associated with fatigue, brain fog, and headaches and sometimes even itching.    Cymbalta was previously effective for pain but caused bruising, leading to discontinuation.   Gabapentin and Lyrica were discussed as potential treatments for fibromyalgia and itching but were declined due to concerns about side effects and current responsibilities at home.  Amitriptyline was also mentioned as a possible option for itching.  -labs will be conducted today to check muscle enzymes (CPK), liver function, kidney function, inflammatory markers, and urinalysis.      I encouraged regular low-impact exercise up to 30 minutes/day.  If this is unattainable, recommended graded exercise program starting with 5 minutes of dedicated cardiovascular exercise daily, increasing by 1 minute daily until goal of 30 minutes daily is reached.    -Recommend conservative measures to improve sleep  -Consider referral  to sleep medicine for CARMELO screening-   -Counseled on alternative therapies that can help with chronic pain including massage therapy, yoga, beverly chi  - Handout provided on fibromyalgia      -Degenerative arthritis thoracic spine  -Chronic back pain  -Chest x-ray 10/26/2024: Normal findings  -Hand x-ray 7/7/2023: Normal findings  -Thoracic spine x-ray 12/20/2019: Mild arthritic change    She does have some osteoarthritis  Recommend as needed NSAID, massage, physical therapy  HLA-B27 has been negative.  No evidence of axial spondylarthritis, ankylosing spondylitis    - Dermatitis   - Chronic itching  The severe itching and dermatitis photos she shows me is suspicious for eczema, a condition for which there is no specific blood test.  The dermatitis could also be self-inflicted as she admits to scratching her arms and back frequently due to the itching  She has no evidence of a hepatitis to account for the itching.  Liver function tests have been normal and hepatitis panel has been negative    Her reported dermatitis is not particularly active today.  I do not see any active skin lesions  I do not think it is likely she has a systemic inflammatory rheumatic disease such as lupus or dermatomyositis to account for the dermatitis or itching  The skin biopsy pathology report from 12/24 done with [facility] in [place] is not available to me today, but will be requested for review   I do not think this is likely to be lupus or connective tissue disease.  Her EMMETT is negative and she does not have clinical features of a connective tissue disease to account for her dermatitis.  There is no clinical or serologic evidence of a specific inflammatory rheumatic disease to account for her dermatitis and chronic itching.  Guidance from a dermatologist is required.  Hydroxyzine (Atarax), gabapentin or pregabalin were discussed as possible treatments for itching, but was declined due to concerns about sedation as she has young  children.  Dupixent was recommended as another possible treatment option for discussion with the dermatologist if they think it might be eczema.  A referral to Dr. Low at [facility] for a second opinion regarding her chronic skin condition will be arranged.    -Endometriosis with history of blood clot x 1 after surgery 2016  Negative hypercoagulable workup with Dr. Velazquez    -Stress    -Sleep disturbance    Orders Placed This Encounter   Procedures   • CBC Auto Differential   • Comprehensive Metabolic Panel   • C-reactive Protein   • Sedimentation Rate   • Urinalysis With Culture If Indicated -   • CK   • Ambulatory Referral to Dermatology     No orders of the defined types were placed in this encounter.     Overall low suspicion of systemic inflammatory rheumatic disease at this time.  I will therefore have the patient return on an as-needed basis and continue to follow with their primary provider and dermatologist.  Patient understands that should they have any rheumatologic concerns in the future to give us a call and I will be happy to re-evaluate.  It was a pleasure to see the patient in clinic today.  Thank you for allowing me to participate in the care of this patient    Follow Up:   Return if symptoms worsen or fail to improve.    TIME SPENT: I spent 66 minutes caring for the patient on this date of service.  This time includes time spent by me in the following activities: Preparing for the visit, obtaining records, reviewing/ordering tests and independently reviewing results, performing a medically appropriate history/exam, counseling and educating the patient/family/caregiver, ordering medications, tests, or procedures, and documenting information in the medical record.    Patient or patient representative verbalized consent for the use of Ambient Listening during the visit with  Michael Avery MD for chart documentation. 4/17/2025  16:13 EDT        Michael Avery MD  [facility]  Rheumatology [facility]

## 2025-04-17 NOTE — LETTER
April 17, 2025    [facility] [address] [place] [id number]                                Michael Avery MD

## 2025-04-18 LAB
ALBUMIN SERPL-MCNC: 4.2 G/DL (ref 3.5–5.2)
ALBUMIN/GLOB SERPL: 1.4 G/DL
ALP SERPL-CCNC: 89 U/L (ref 39–117)
ALT SERPL W P-5'-P-CCNC: 16 U/L (ref 1–33)
ANION GAP SERPL CALCULATED.3IONS-SCNC: 10.3 MMOL/L (ref 5–15)
AST SERPL-CCNC: 19 U/L (ref 1–32)
BILIRUB SERPL-MCNC: 0.4 MG/DL (ref 0–1.2)
BUN SERPL-MCNC: 8 MG/DL (ref 6–20)
BUN/CREAT SERPL: 10.4 (ref 7–25)
CALCIUM SPEC-SCNC: 9.5 MG/DL (ref 8.6–10.5)
CHLORIDE SERPL-SCNC: 107 MMOL/L (ref 98–107)
CK SERPL-CCNC: 144 U/L (ref 20–180)
CO2 SERPL-SCNC: 26.7 MMOL/L (ref 22–29)
CREAT SERPL-MCNC: 0.77 MG/DL (ref 0.57–1)
CRP SERPL-MCNC: <0.3 MG/DL (ref 0–0.5)
EGFRCR SERPLBLD CKD-EPI 2021: 95.9 ML/MIN/1.73
GLOBULIN UR ELPH-MCNC: 3.1 GM/DL
GLUCOSE SERPL-MCNC: 86 MG/DL (ref 65–99)
POTASSIUM SERPL-SCNC: 4 MMOL/L (ref 3.5–5.2)
PROT SERPL-MCNC: 7.3 G/DL (ref 6–8.5)
SODIUM SERPL-SCNC: 144 MMOL/L (ref 136–145)

## 2025-04-21 NOTE — TELEPHONE ENCOUNTER
I gave Belén rain's name and  and she is going to send a signed SUSI to [facility] as that's the only way they will release any records/notes. -Steffanie Gr, A

## 2025-08-05 DIAGNOSIS — E78.01 FAMILIAL HYPERCHOLESTEROLEMIA: ICD-10-CM

## 2025-08-06 RX ORDER — ROSUVASTATIN CALCIUM 10 MG/1
10 TABLET, COATED ORAL DAILY
Qty: 90 TABLET | Refills: 1 | Status: SHIPPED | OUTPATIENT
Start: 2025-08-06

## (undated) DEVICE — ADHS LIQ MASTISOL 2/3ML

## (undated) DEVICE — TRENDELENBURG WINGPAD POSITIONING KIT DELUXE - WITHOUT BODY STRAP: Brand: SOULE MEDICAL

## (undated) DEVICE — TIP COVER ACCESSORY

## (undated) DEVICE — COLUMN DRAPE

## (undated) DEVICE — DRAPE,TOP,102X53,STERILE: Brand: MEDLINE

## (undated) DEVICE — SUT VIC 2/0 CT2 27IN J269H

## (undated) DEVICE — ANTIBACTERIAL UNDYED BRAIDED (POLYGLACTIN 910), SYNTHETIC ABSORBABLE SUTURE: Brand: COATED VICRYL

## (undated) DEVICE — STRIP,CLOSURE,WOUND,MEDI-STRIP,1/2X4: Brand: MEDLINE

## (undated) DEVICE — BLANKT WARM UPPR/BDY ARM/OUT 57X196CM

## (undated) DEVICE — PATIENT RETURN ELECTRODE, SINGLE-USE, CONTACT QUALITY MONITORING, ADULT, WITH 9FT CORD, FOR PATIENTS WEIGING OVER 33LBS. (15KG): Brand: MEGADYNE

## (undated) DEVICE — DRSNG WND BORDR/ADHS NONADHR/GZ LF 2X2IN STRL

## (undated) DEVICE — SNAP KOVER: Brand: UNBRANDED

## (undated) DEVICE — ST TBG CONN PNEUMOCLEAR EVAC SMOKE HEAT/HUMID

## (undated) DEVICE — ANTIBACTERIAL UNDYED BRAIDED (POLYGLACTIN 910), SYNTHETIC ABSORBABLE SURGICAL SUTURE: Brand: COATED VICRYL

## (undated) DEVICE — MANIP UTER RUMI TP 6.7MMX8CM BLU

## (undated) DEVICE — BLADELESS OBTURATOR: Brand: WECK VISTA

## (undated) DEVICE — APPL CHLORAPREP TINTED 26ML TEAL

## (undated) DEVICE — PK MAJ GYN DAVINCI 10

## (undated) DEVICE — ARM DRAPE

## (undated) DEVICE — MANIP UTER RUMI 2 KOH EFFICIENT 3CM BL

## (undated) DEVICE — LAPAROVUE VISIBILITY SYSTEM LAPAROSCOPIC SOLUTIONS: Brand: LAPAROVUE

## (undated) DEVICE — CANNULA SEAL

## (undated) DEVICE — GLV SURG DERMASSURE GRN LF PF 7.0

## (undated) DEVICE — SCRB SURG BACTOSHIELD CHG 4PCT 4OZ